# Patient Record
Sex: FEMALE | Race: WHITE | HISPANIC OR LATINO | Employment: OTHER | URBAN - METROPOLITAN AREA
[De-identification: names, ages, dates, MRNs, and addresses within clinical notes are randomized per-mention and may not be internally consistent; named-entity substitution may affect disease eponyms.]

---

## 2017-03-14 RX ORDER — AMLODIPINE BESYLATE 5 MG/1
5 TABLET ORAL DAILY
Qty: 90 TABLET | Refills: 0 | Status: SHIPPED | OUTPATIENT
Start: 2017-03-14 | End: 2017-04-28 | Stop reason: SDUPTHER

## 2017-03-14 RX ORDER — METFORMIN HYDROCHLORIDE 500 MG/1
500 TABLET ORAL 2 TIMES DAILY WITH MEALS
Qty: 180 TABLET | Refills: 0 | Status: SHIPPED | OUTPATIENT
Start: 2017-03-14 | End: 2017-04-28 | Stop reason: SDUPTHER

## 2017-03-16 ENCOUNTER — OFFICE VISIT (OUTPATIENT)
Dept: OPTOMETRY | Facility: CLINIC | Age: 67
End: 2017-03-16
Payer: MEDICARE

## 2017-03-16 DIAGNOSIS — E11.3293 MILD NONPROLIFERATIVE DIABETIC RETINOPATHY OF BOTH EYES WITHOUT MACULAR EDEMA ASSOCIATED WITH TYPE 2 DIABETES MELLITUS: Primary | ICD-10-CM

## 2017-03-16 DIAGNOSIS — H25.11 NUCLEAR SCLEROSIS, RIGHT: ICD-10-CM

## 2017-03-16 PROCEDURE — 92014 COMPRE OPH EXAM EST PT 1/>: CPT | Mod: S$GLB,,, | Performed by: OPTOMETRIST

## 2017-03-16 PROCEDURE — 99499 UNLISTED E&M SERVICE: CPT | Mod: S$GLB,,, | Performed by: OPTOMETRIST

## 2017-03-16 PROCEDURE — 99999 PR PBB SHADOW E&M-EST. PATIENT-LVL III: CPT | Mod: PBBFAC,,, | Performed by: OPTOMETRIST

## 2017-03-16 NOTE — PROGRESS NOTES
HPI     Diabetic eye exam  Blur ou at dist, x mos, no assoc pain or red, no relief over time,   constant       Last edited by Nate Alexandre, OD on 3/16/2017  3:30 PM.     ROS     Negative for: Constitutional, Gastrointestinal, Neurological, Skin,   Genitourinary, Musculoskeletal, HENT, Endocrine, Cardiovascular, Eyes,   Respiratory, Psychiatric, Allergic/Imm, Heme/Lymph    Last edited by Nate Alexandre, OD on 3/16/2017  3:07 PM. (History)        Assessment /Plan     For exam results, see Encounter Report.    Mild nonproliferative diabetic retinopathy of both eyes without macular edema associated with type 2 diabetes mellitus    Nuclear sclerosis, right      1. Mild diabetic retinopathy, no csme. Return in 1 year for dilated eye exam.  2. Refer to Dr. Fermin for cataract evaluation and possible removal.

## 2017-03-20 ENCOUNTER — OFFICE VISIT (OUTPATIENT)
Dept: OPHTHALMOLOGY | Facility: CLINIC | Age: 67
End: 2017-03-20
Payer: MEDICARE

## 2017-03-20 ENCOUNTER — TELEPHONE (OUTPATIENT)
Dept: OPHTHALMOLOGY | Facility: CLINIC | Age: 67
End: 2017-03-20

## 2017-03-20 DIAGNOSIS — Z96.1 PSEUDOPHAKIA: ICD-10-CM

## 2017-03-20 DIAGNOSIS — Z79.4 CONTROLLED TYPE 2 DIABETES MELLITUS WITH BOTH EYES AFFECTED BY MILD NONPROLIFERATIVE RETINOPATHY WITHOUT MACULAR EDEMA, WITH LONG-TERM CURRENT USE OF INSULIN: ICD-10-CM

## 2017-03-20 DIAGNOSIS — H52.7 REFRACTIVE ERROR: ICD-10-CM

## 2017-03-20 DIAGNOSIS — H25.11 NUCLEAR SCLEROSIS, RIGHT: Primary | ICD-10-CM

## 2017-03-20 DIAGNOSIS — E11.3293 CONTROLLED TYPE 2 DIABETES MELLITUS WITH BOTH EYES AFFECTED BY MILD NONPROLIFERATIVE RETINOPATHY WITHOUT MACULAR EDEMA, WITH LONG-TERM CURRENT USE OF INSULIN: ICD-10-CM

## 2017-03-20 DIAGNOSIS — H25.11 NS (NUCLEAR SCLEROSIS), RIGHT: Primary | ICD-10-CM

## 2017-03-20 PROCEDURE — 92014 COMPRE OPH EXAM EST PT 1/>: CPT | Mod: 57,S$GLB,, | Performed by: OPHTHALMOLOGY

## 2017-03-20 PROCEDURE — 92136 OPHTHALMIC BIOMETRY: CPT | Mod: 26,RT,S$GLB, | Performed by: OPHTHALMOLOGY

## 2017-03-20 PROCEDURE — 99499 UNLISTED E&M SERVICE: CPT | Mod: S$GLB,,, | Performed by: OPHTHALMOLOGY

## 2017-03-20 PROCEDURE — 99999 PR PBB SHADOW E&M-EST. PATIENT-LVL II: CPT | Mod: PBBFAC,,, | Performed by: OPHTHALMOLOGY

## 2017-03-20 RX ORDER — OFLOXACIN 3 MG/ML
1 SOLUTION/ DROPS OPHTHALMIC 4 TIMES DAILY
Qty: 5 ML | Refills: 1 | Status: SHIPPED | OUTPATIENT
Start: 2017-03-20 | End: 2017-03-30

## 2017-03-20 RX ORDER — NEPAFENAC 3 MG/ML
1 SUSPENSION/ DROPS OPHTHALMIC DAILY
Qty: 3 ML | Refills: 1 | Status: SHIPPED | OUTPATIENT
Start: 2017-03-20 | End: 2017-04-19

## 2017-03-20 RX ORDER — DIFLUPREDNATE OPHTHALMIC 0.5 MG/ML
1 EMULSION OPHTHALMIC 4 TIMES DAILY
Qty: 5 ML | Refills: 1 | Status: SHIPPED | OUTPATIENT
Start: 2017-03-21 | End: 2017-04-20

## 2017-03-20 NOTE — PROGRESS NOTES
Subjective:       Patient ID: Amanda Reyes is a 67 y.o. female.    Chief Complaint: Cataract (Catarcat Eval per Dr. Alexandre)    HPI  Review of Systems    Objective:      Physical Exam    Assessment:       1. Nuclear sclerosis, right    2. PSC (posterior subcapsular cataract), right    3. Controlled type 2 diabetes mellitus with both eyes affected by mild nonproliferative retinopathy without macular edema, with long-term current use of insulin    4. Refractive error    5. Pseudophakia        Plan:       Visually significant cataract OD -Pt. Wants Sx.     Mild NPDR OU-No CSME OU.  RE        CE OD 3/21/17 SN60WF 20.5.  Control DM.

## 2017-03-21 ENCOUNTER — ANESTHESIA EVENT (OUTPATIENT)
Dept: SURGERY | Facility: HOSPITAL | Age: 67
End: 2017-03-21
Payer: MEDICARE

## 2017-03-21 ENCOUNTER — HOSPITAL ENCOUNTER (OUTPATIENT)
Facility: HOSPITAL | Age: 67
Discharge: HOME | End: 2017-03-21
Attending: OPHTHALMOLOGY | Admitting: OPHTHALMOLOGY
Payer: MEDICARE

## 2017-03-21 ENCOUNTER — SURGERY (OUTPATIENT)
Age: 67
End: 2017-03-21

## 2017-03-21 ENCOUNTER — ANESTHESIA (OUTPATIENT)
Dept: SURGERY | Facility: HOSPITAL | Age: 67
End: 2017-03-21
Payer: MEDICARE

## 2017-03-21 VITALS
DIASTOLIC BLOOD PRESSURE: 70 MMHG | TEMPERATURE: 98 F | HEIGHT: 67 IN | OXYGEN SATURATION: 95 % | BODY MASS INDEX: 27 KG/M2 | WEIGHT: 172 LBS | HEART RATE: 65 BPM | RESPIRATION RATE: 18 BRPM | SYSTOLIC BLOOD PRESSURE: 132 MMHG

## 2017-03-21 DIAGNOSIS — H25.10 SENILE NUCLEAR SCLEROSIS: ICD-10-CM

## 2017-03-21 LAB
POCT GLUCOSE: 296 MG/DL (ref 70–110)
POCT GLUCOSE: 306 MG/DL (ref 70–110)

## 2017-03-21 PROCEDURE — 27201423 OPTIME MED/SURG SUP & DEVICES STERILE SUPPLY: Performed by: OPHTHALMOLOGY

## 2017-03-21 PROCEDURE — D9220A PRA ANESTHESIA: Mod: ,,, | Performed by: ANESTHESIOLOGY

## 2017-03-21 PROCEDURE — 36000706: Performed by: OPHTHALMOLOGY

## 2017-03-21 PROCEDURE — 25000003 PHARM REV CODE 250: Performed by: OPHTHALMOLOGY

## 2017-03-21 PROCEDURE — V2632 POST CHMBR INTRAOCULAR LENS: HCPCS | Performed by: OPHTHALMOLOGY

## 2017-03-21 PROCEDURE — 36000707: Performed by: OPHTHALMOLOGY

## 2017-03-21 PROCEDURE — 82962 GLUCOSE BLOOD TEST: CPT | Performed by: OPHTHALMOLOGY

## 2017-03-21 PROCEDURE — 37000008 HC ANESTHESIA 1ST 15 MINUTES: Performed by: OPHTHALMOLOGY

## 2017-03-21 PROCEDURE — 63600175 PHARM REV CODE 636 W HCPCS: Performed by: OPHTHALMOLOGY

## 2017-03-21 PROCEDURE — C9447 INJ, PHENYLEPHRINE KETOROLAC: HCPCS | Performed by: OPHTHALMOLOGY

## 2017-03-21 PROCEDURE — 71000015 HC POSTOP RECOV 1ST HR: Performed by: OPHTHALMOLOGY

## 2017-03-21 PROCEDURE — 63600175 PHARM REV CODE 636 W HCPCS: Performed by: NURSE ANESTHETIST, CERTIFIED REGISTERED

## 2017-03-21 PROCEDURE — 37000009 HC ANESTHESIA EA ADD 15 MINS: Performed by: OPHTHALMOLOGY

## 2017-03-21 PROCEDURE — 66984 XCAPSL CTRC RMVL W/O ECP: CPT | Mod: RT,,, | Performed by: OPHTHALMOLOGY

## 2017-03-21 PROCEDURE — 25000003 PHARM REV CODE 250: Performed by: NURSE ANESTHETIST, CERTIFIED REGISTERED

## 2017-03-21 DEVICE — LENS 20.5 ACRYSOF: Type: IMPLANTABLE DEVICE | Site: EYE | Status: FUNCTIONAL

## 2017-03-21 RX ORDER — PROPARACAINE HYDROCHLORIDE 5 MG/ML
1 SOLUTION/ DROPS OPHTHALMIC
Status: DISCONTINUED | OUTPATIENT
Start: 2017-03-21 | End: 2017-03-21 | Stop reason: HOSPADM

## 2017-03-21 RX ORDER — PHENYLEPHRINE HYDROCHLORIDE 25 MG/ML
SOLUTION/ DROPS OPHTHALMIC
Status: DISCONTINUED
Start: 2017-03-21 | End: 2017-03-21 | Stop reason: HOSPADM

## 2017-03-21 RX ORDER — SODIUM CHLORIDE 9 MG/ML
INJECTION, SOLUTION INTRAVENOUS CONTINUOUS
Status: DISCONTINUED | OUTPATIENT
Start: 2017-03-21 | End: 2017-03-21 | Stop reason: HOSPADM

## 2017-03-21 RX ORDER — OFLOXACIN 3 MG/ML
1 SOLUTION/ DROPS OPHTHALMIC
Status: COMPLETED | OUTPATIENT
Start: 2017-03-21 | End: 2017-03-21

## 2017-03-21 RX ORDER — PHENYLEPHRINE HYDROCHLORIDE 25 MG/ML
1 SOLUTION/ DROPS OPHTHALMIC
Status: DISCONTINUED | OUTPATIENT
Start: 2017-03-21 | End: 2017-03-21 | Stop reason: HOSPADM

## 2017-03-21 RX ORDER — LIDOCAINE HYDROCHLORIDE 10 MG/ML
1 INJECTION, SOLUTION EPIDURAL; INFILTRATION; INTRACAUDAL; PERINEURAL ONCE
Status: DISCONTINUED | OUTPATIENT
Start: 2017-03-21 | End: 2017-03-21 | Stop reason: HOSPADM

## 2017-03-21 RX ORDER — TROPICAMIDE 10 MG/ML
1 SOLUTION/ DROPS OPHTHALMIC
Status: DISCONTINUED | OUTPATIENT
Start: 2017-03-21 | End: 2017-03-21 | Stop reason: HOSPADM

## 2017-03-21 RX ORDER — PREDNISOLONE ACETATE 10 MG/ML
SUSPENSION/ DROPS OPHTHALMIC
Status: DISCONTINUED
Start: 2017-03-21 | End: 2017-03-21 | Stop reason: HOSPADM

## 2017-03-21 RX ORDER — ACETAMINOPHEN 325 MG/1
650 TABLET ORAL EVERY 4 HOURS PRN
Status: DISCONTINUED | OUTPATIENT
Start: 2017-03-21 | End: 2017-03-21 | Stop reason: HOSPADM

## 2017-03-21 RX ORDER — CYCLOPENTOLATE HYDROCHLORIDE 10 MG/ML
SOLUTION/ DROPS OPHTHALMIC
Status: DISCONTINUED
Start: 2017-03-21 | End: 2017-03-21 | Stop reason: HOSPADM

## 2017-03-21 RX ORDER — OFLOXACIN 3 MG/ML
SOLUTION/ DROPS OPHTHALMIC
Status: DISCONTINUED | OUTPATIENT
Start: 2017-03-21 | End: 2017-03-21 | Stop reason: HOSPADM

## 2017-03-21 RX ORDER — LIDOCAINE HYDROCHLORIDE 40 MG/ML
INJECTION, SOLUTION RETROBULBAR
Status: DISCONTINUED
Start: 2017-03-21 | End: 2017-03-21 | Stop reason: HOSPADM

## 2017-03-21 RX ORDER — LIDOCAINE HYDROCHLORIDE 10 MG/ML
1 INJECTION, SOLUTION EPIDURAL; INFILTRATION; INTRACAUDAL; PERINEURAL ONCE
Status: COMPLETED | OUTPATIENT
Start: 2017-03-21 | End: 2017-03-21

## 2017-03-21 RX ORDER — OFLOXACIN 3 MG/ML
SOLUTION/ DROPS OPHTHALMIC
Status: DISCONTINUED
Start: 2017-03-21 | End: 2017-03-21 | Stop reason: HOSPADM

## 2017-03-21 RX ORDER — SODIUM CHLORIDE 0.9 % (FLUSH) 0.9 %
3 SYRINGE (ML) INJECTION
Status: DISCONTINUED | OUTPATIENT
Start: 2017-03-21 | End: 2017-03-21 | Stop reason: HOSPADM

## 2017-03-21 RX ORDER — HYDROCODONE BITARTRATE AND ACETAMINOPHEN 5; 325 MG/1; MG/1
1 TABLET ORAL EVERY 4 HOURS PRN
Status: DISCONTINUED | OUTPATIENT
Start: 2017-03-21 | End: 2017-03-21 | Stop reason: HOSPADM

## 2017-03-21 RX ORDER — MIDAZOLAM HYDROCHLORIDE 1 MG/ML
INJECTION INTRAMUSCULAR; INTRAVENOUS
Status: DISCONTINUED | OUTPATIENT
Start: 2017-03-21 | End: 2017-03-21

## 2017-03-21 RX ORDER — CYCLOPENTOLATE HYDROCHLORIDE 10 MG/ML
1 SOLUTION/ DROPS OPHTHALMIC
Status: COMPLETED | OUTPATIENT
Start: 2017-03-21 | End: 2017-03-21

## 2017-03-21 RX ORDER — FAMOTIDINE 10 MG/ML
INJECTION INTRAVENOUS
Status: DISCONTINUED | OUTPATIENT
Start: 2017-03-21 | End: 2017-03-21

## 2017-03-21 RX ORDER — LIDOCAINE HYDROCHLORIDE 40 MG/ML
INJECTION, SOLUTION RETROBULBAR
Status: DISCONTINUED | OUTPATIENT
Start: 2017-03-21 | End: 2017-03-21 | Stop reason: HOSPADM

## 2017-03-21 RX ORDER — PREDNISOLONE ACETATE 10 MG/ML
SUSPENSION/ DROPS OPHTHALMIC
Status: DISCONTINUED | OUTPATIENT
Start: 2017-03-21 | End: 2017-03-21 | Stop reason: HOSPADM

## 2017-03-21 RX ADMIN — OFLOXACIN 1 DROP: 3 SOLUTION OPHTHALMIC at 07:03

## 2017-03-21 RX ADMIN — FAMOTIDINE 20 MG: 10 INJECTION, SOLUTION INTRAVENOUS at 07:03

## 2017-03-21 RX ADMIN — MIDAZOLAM HYDROCHLORIDE 1 MG: 1 INJECTION, SOLUTION INTRAMUSCULAR; INTRAVENOUS at 08:03

## 2017-03-21 RX ADMIN — CYCLOPENTOLATE HYDROCHLORIDE 1 DROP: 10 SOLUTION/ DROPS OPHTHALMIC at 07:03

## 2017-03-21 RX ADMIN — SODIUM CHONDROITIN SULFATE / SODIUM HYALURONATE 1.05 ML: 0.55-0.5 INJECTION INTRAOCULAR at 08:03

## 2017-03-21 RX ADMIN — PHENYLEPHRINE HYDROCHLORIDE 1 DROP: 2.5 SOLUTION/ DROPS OPHTHALMIC at 07:03

## 2017-03-21 RX ADMIN — PHENYLEPHRINE AND KETOROLAC 4 ML: 10.16; 2.88 INJECTION, SOLUTION, CONCENTRATE INTRAOCULAR at 08:03

## 2017-03-21 RX ADMIN — MIDAZOLAM HYDROCHLORIDE 2 MG: 1 INJECTION, SOLUTION INTRAMUSCULAR; INTRAVENOUS at 07:03

## 2017-03-21 RX ADMIN — SODIUM CHLORIDE: 0.9 INJECTION, SOLUTION INTRAVENOUS at 07:03

## 2017-03-21 RX ADMIN — ACETAMINOPHEN 650 MG: 325 TABLET ORAL at 08:03

## 2017-03-21 RX ADMIN — OFLOXACIN 2 DROP: 3 SOLUTION/ DROPS OPHTHALMIC at 08:03

## 2017-03-21 RX ADMIN — LIDOCAINE HYDROCHLORIDE 5 ML: 40 INJECTION, SOLUTION RETROBULBAR; TOPICAL at 08:03

## 2017-03-21 RX ADMIN — PREDNISOLONE ACETATE 2 DROP: 10 SUSPENSION/ DROPS OPHTHALMIC at 08:03

## 2017-03-21 RX ADMIN — MIDAZOLAM HYDROCHLORIDE 0.5 MG: 1 INJECTION, SOLUTION INTRAMUSCULAR; INTRAVENOUS at 08:03

## 2017-03-21 RX ADMIN — LIDOCAINE HYDROCHLORIDE 10 MG: 10 INJECTION, SOLUTION EPIDURAL; INFILTRATION; INTRACAUDAL; PERINEURAL at 07:03

## 2017-03-21 RX ADMIN — PROPARACAINE HYDROCHLORIDE 1 DROP: 5 SOLUTION/ DROPS OPHTHALMIC at 07:03

## 2017-03-21 NOTE — TRANSFER OF CARE
"Anesthesia Transfer of Care Note    Patient: Amanda Reyes    Procedure(s) Performed: Procedure(s) (LRB):  PHACOEMULSIFICATION-ASPIRATION-CATARACT (Right)  INSERTION-INTRAOCULAR LENS (IOL) (Right)    Patient location: PACU    Anesthesia Type: MAC    Transport from OR: Transported from OR on room air with adequate spontaneous ventilation    Post pain: adequate analgesia    Post assessment: no apparent anesthetic complications    Post vital signs: stable    Level of consciousness: awake, alert and oriented    Nausea/Vomiting: no nausea/vomiting    Complications: none          Last vitals:   Visit Vitals   03/21/17 0849    /75    Pulse 68    Temp 97.9    Resp 16    Ht 5' 7" (1.702 m)    Wt 78 kg (172 lb)    SpO2 97%    BMI 26.94 kg/m2     "

## 2017-03-21 NOTE — ANESTHESIA RELEASE NOTE
Anesthesia Release from PACU Note    Anesthesia Release from PACU note    Patient: Amanda Reyes    Procedure(s) Performed: Procedure(s) (LRB):  PHACOEMULSIFICATION-ASPIRATION-CATARACT (Right)  INSERTION-INTRAOCULAR LENS (IOL) (Right)    Anesthesia type: general    Post pain: Adequate analgesia    Post assessment: no apparent anesthetic complications, tolerated procedure well and no evidence of recall    Last Vitals:   Vitals:    03/21/17 0844   BP: (!) 151/75   Pulse: 68   Resp: 18   Temp: 36.7 °C (98.1 °F)   SpO2: 97%       Post vital signs: stable    Level of consciousness: awake, alert  and oriented    Nausea/Vomiting: no nausea/no vomiting    Complications: none    Airway Patency: patent    Respiratory: unassisted    Cardiovascular: stable and blood pressure at baseline    Hydration: euvolemic

## 2017-03-21 NOTE — PLAN OF CARE
Pt tolerated procedure well, vs stable, no distress noted or reported, tolerated PO without difficulties, pain well tolerated, discharge instructions reviewed with pt and family, verbalized understanding, consents with chart.

## 2017-03-21 NOTE — PLAN OF CARE
Problem: Patient Care Overview  Goal: Plan of Care Review  Outcome: Ongoing (interventions implemented as appropriate)  Pt arrived amb a/a nad noted or reported. Pt gave informed consent and voiced intended procedure. Pt gave two verbal patient identifiers, allergies, NPO status and reported meds taken this am. No s/s of bs intolerance noted or reported. Pt is Estonian speaking with daughter attempting to assist in answering questions regarding health history. Poor historians and anesthesia notified. Interpretor contacted to help clarify pt's history and medication administration.    Dr. Carmen notified pt is hyperglycemic. No new rxg, no s/s of intolerance noted

## 2017-03-21 NOTE — ANESTHESIA PREPROCEDURE EVALUATION
03/21/2017  Amanda Reyes is a 67 y.o., female.    [facility] Anesthesia Evaluation         Review of Systems  Anesthesia Hx:  No problems with previous Anesthesia   Cardiovascular:   Exercise tolerance: good Hypertension, well controlled  Denies Angina.    Pulmonary:   Denies Shortness of breath.    Hepatic/GI:   GERD    Musculoskeletal:   Arthritis     Endocrine:   Diabetes, poorly controlled    Psych:   anxiety depression          Physical Exam  General:  Well nourished    Airway/Jaw/Neck:  Airway Findings: Mouth Opening: Normal Tongue: Normal  General Airway Assessment: Adult  Mallampati: II  TM Distance: Normal, at least 6 cm  Jaw/Neck Findings:     Neck ROM: Normal ROM      Dental:  Dental Findings: In tact   Chest/Lungs:  Chest/Lungs Findings: Normal Respiratory Rate, Clear to auscultation     Heart/Vascular:  Heart Findings: Rate: Normal  Rhythm: Regular Rhythm  Sounds: Normal        Mental Status:  Mental Status Findings:  Alert and Oriented, Cooperative         Anesthesia Plan  Type of Anesthesia, risks & benefits discussed:  Anesthesia Type:  general  Patient's Preference:   Intra-op Monitoring Plan:   Intra-op Monitoring Plan Comments:   Post Op Pain Control Plan:   Post Op Pain Control Plan Comments:   Induction:   IV  Beta Blocker:  Patient is not currently on a Beta-Blocker (No further documentation required).       Informed Consent: Patient understands risks and agrees with Anesthesia plan.  Questions answered. Anesthesia consent signed with patient.  ASA Score: 2     Day of Surgery Review of History & Physical:            Ready For Surgery From Anesthesia Perspective.

## 2017-03-21 NOTE — H&P
[facility]  History & Physical    Subjective:      Chief Complaint/Reason for Admission:     Amanda Reyes is a 67 y.o. female.    Past Medical History:   Diagnosis Date    Abnormal Pap smear     Anxiety     Arthritis     Cataract     Cataract of left eye     Diabetes     Diabetes mellitus, type 2     Eye problem     GERD (gastroesophageal reflux disease)     Head and face pain     Hypertension      Past Surgical History:   Procedure Laterality Date    CATARACT EXTRACTION W/  INTRAOCULAR LENS IMPLANT Left 5/12/2015    Dr. Fermin    HYSTERECTOMY      JOINT REPLACEMENT      bilateral knees    KIDNEY SURGERY      right nephrectomy    TUBAL LIGATION       Family History   Problem Relation Age of Onset    Hypertension Mother     Hypertension Father     Cataracts Father     No Known Problems Maternal Grandmother     No Known Problems Maternal Grandfather     No Known Problems Paternal Grandmother     No Known Problems Paternal Grandfather     No Known Problems Sister     No Known Problems Brother     No Known Problems Maternal Aunt     No Known Problems Maternal Uncle     No Known Problems Paternal Aunt     No Known Problems Paternal Uncle     Anesthesia problems Neg Hx     Amblyopia Neg Hx     Blindness Neg Hx     Glaucoma Neg Hx     Macular degeneration Neg Hx     Retinal detachment Neg Hx     Strabismus Neg Hx     Cancer Neg Hx     Diabetes Neg Hx     Stroke Neg Hx     Thyroid disease Neg Hx      Social History   Substance Use Topics    Smoking status: Never Smoker    Smokeless tobacco: None    Alcohol use No       PTA Medications   Medication Sig    amlodipine (NORVASC) 5 MG tablet Take 1 tablet (5 mg total) by mouth once daily.    diazepam (VALIUM) 5 MG tablet Take 1 tablet (5 mg total) by mouth every 12 (twelve) hours as needed for Anxiety.    difluprednate (DUREZOL) 0.05 % Drop ophthalmic solution Place 1 drop into the right eye 4 (four)  times daily. For 30 days    fluoxetine (PROZAC) 20 MG capsule Take 1 capsule (20 mg total) by mouth once daily.    ILEVRO 0.3 % DrpS Place 1 drop into both eyes once daily. For 30 days    lovastatin (MEVACOR) 20 MG tablet Take 1 tablet (20 mg total) by mouth every evening.    metformin (GLUCOPHAGE) 500 MG tablet Take 1 tablet (500 mg total) by mouth 2 (two) times daily with meals.    omeprazole (PRILOSEC) 20 MG capsule Take 1 capsule (20 mg total) by mouth before breakfast.    enalapril (VASOTEC) 20 MG tablet Take 1 tablet (20 mg total) by mouth once daily.    fesoterodine (TOVIAZ) 4 mg Tb24 Take 1 tablet (4 mg total) by mouth once daily.    GLUCOPHAGE 500 mg tablet     hydrochlorothiazide (HYDRODIURIL) 25 MG tablet Take 1 tablet (25 mg total) by mouth once daily.    ofloxacin (OCUFLOX) 0.3 % ophthalmic solution Place 1 drop into the right eye 4 (four) times daily.    peg 400-propylene glycol, PF, (SYSTANE ULTRA, PF,) 0.4-0.3 % Dpet Apply 1 drop to eye 4 (four) times daily as needed.    senna-docusate 8.6-50 mg (PERICOLACE) 8.6-50 mg per tablet Take 1 tablet by mouth 2 (two) times daily as needed for Constipation.    UNABLE TO FIND Take 20 mg by mouth once daily. Fluoextine    valacyclovir (VALTREX) 1000 MG tablet Take 1 tablet (1,000 mg total) by mouth 2 (two) times daily.     Review of patient's allergies indicates:  No Known Allergies     Review of Systems   Eyes: Positive for blurred vision.   All other systems reviewed and are negative.      Objective:      Vital Signs (Most Recent)  Temp: 98.1 °F (36.7 °C) (03/21/17 0643)  Pulse: 78 (03/21/17 0643)  Resp: 16 (03/21/17 0643)  BP: (!) 147/72 (03/21/17 0643)  SpO2: 97 % (03/21/17 0643)    Vital Signs Range (Last 24H):  Temp:  [98.1 °F (36.7 °C)]   Pulse:  [78]   Resp:  [16]   BP: (147)/(72)   SpO2:  [97 %]     Physical Exam   Constitutional: She is oriented to person, place, and time. She appears well-developed and well-nourished.   HENT:   Head:  Normocephalic.   Eyes: Conjunctivae and EOM are normal. Pupils are equal, round, and reactive to light.   Neck: Normal range of motion. Neck supple.   Cardiovascular: Normal rate, regular rhythm and normal heart sounds.    Pulmonary/Chest: Effort normal and breath sounds normal.   Abdominal: Soft. Bowel sounds are normal.   Musculoskeletal: Normal range of motion.   Neurological: She is alert and oriented to person, place, and time.   Skin: Skin is warm.   Psychiatric: She has a normal mood and affect.       Data Review:   ECG:     Assessment:      Cataract OD.    Plan:    CE OD.

## 2017-03-21 NOTE — IP AVS SNAPSHOT
[facility]  [address] [place] [address]  Phone: [phone]           Instrucciones de Mary de Pacientes    Nuestro objetivo es que te prepara para el éxito. Keri paquete incluye información sobre yadav enfermedad, medicamentos y atención médica a domicilio. [place]ron le ayudará a cuidar y que no se enferman más y necesita volver al hospital.     Por favor, pregunte a yadav enfermera si tiene alguna pregunta.       Hay muchos detalles a recordar cuando se prepara para salir del hospital. Capron es lo que necesita hacer:    1. [place] yadav medicina. Si usted tiene agueda receta, revise la lista de medicamentos en las siguientes páginas. Es posible que tenga nuevos medicamentos para recoger en la farmacia y otros que tendrá que dejar de wilfred. Revise las instrucciones sobre cómo y cuándo wilfred niyah medicamentos. Consulte con el médico o el enfermeras si no está seguro de qué hacer.    2. Ir a niyah citas de seguimiento. La información específica de seguimiento aparece en las siguientes páginas. Usted puede ser contactado por agueda enfermera o proveedor clínico sobre las próximas citas. Estar seguro de que tiene todos los números de teléfono para comunicarse si es necesario. Por favor, póngase en contacto con la oficina de yadav profesional médico si no puede hacer agueda mary.     3. Esté atento a señales de advertencia. El médico o la enfermera le dará señales de alarma detallados que debe observar y cuándo llamar para la ayuda. Estas instrucciones también pueden incluir información educativa acerca de yadav condición. Si usted experimenta cualquiera de las señales de advertencia para yadav jake, llame a yadav médico.             [facility]    Si yadav médico no le ha indicado a lo contrário, por favor   contactar a [facility], nuestra línea de cuidado de enfermeras está disponible para asistirle 24/7.    [phone] (servicio gratuito)    Enfermeras registradas de [facility] pueden ayudarle a  reservar agueda mary, proveer educación para la jake, asesoría clínica, y otros servicios de asesoramiento.                  ** Verificar la lista de medicamentos es correcta y está actualizada. Llevar esto con usted en dwaine de emergencia. Si niyah medicamentos lynn cambiado, por favor notifique a yadav proveedor de atención médica.             Lista de medicamentos      SIGA tomando estos medicamentos        Additional Info                      amlodipine 5 MG tablet   También conocido prerna:  NORVASC   Cantidad:  90 tablet   Recargas:  0   Dosis:  5 mg    Instrucciones:  Take 1 tablet (5 mg total) by mouth once daily.     Begin Date    AM    Noon    PM    Bedtime       diazePAM 5 MG tablet   También conocido prerna:  VALIUM   Cantidad:  90 tablet   Recargas:  0   Dosis:  5 mg    Instrucciones:  Take 1 tablet (5 mg total) by mouth every 12 (twelve) hours as needed for Anxiety.     Begin Date    AM    Noon    PM    Bedtime       difluprednate 0.05 % Drop ophthalmic solution   También conocido prerna:  DUREZOL   Cantidad:  5 mL   Recargas:  1   Dosis:  1 drop    Instrucciones:  Place 1 drop into the right eye 4 (four) times daily. For 30 days     Begin Date    AM    Noon    PM    Bedtime       enalapril 20 MG tablet   También conocido prerna:  VASOTEC   Cantidad:  90 tablet   Recargas:  0   Dosis:  20 mg    Instrucciones:  Take 1 tablet (20 mg total) by mouth once daily.     Begin Date    AM    Noon    PM    Bedtime       fesoterodine 4 mg Tb24   También conocido prerna:  TOVIAZ   Cantidad:  90 tablet   Recargas:  0   Dosis:  4 mg    Instrucciones:  Take 1 tablet (4 mg total) by mouth once daily.     Begin Date    AM    Noon    PM    Bedtime       fluoxetine 20 MG capsule   También conocido prerna:  PROZAC   Cantidad:  90 capsule   Recargas:  1   Dosis:  20 mg    Instrucciones:  Take 1 capsule (20 mg total) by mouth once daily.     Begin Date    AM    Noon    PM    Bedtime       hydrochlorothiazide 25 MG tablet   También conocido prerna:   HYDRODIURIL   Cantidad:  90 tablet   Recargas:  0   Dosis:  25 mg    Instrucciones:  Take 1 tablet (25 mg total) by mouth once daily.     Begin Date    AM    Noon    PM    Bedtime       ILEVRO 0.3 % Drps   Cantidad:  3 mL   Recargas:  1   Dosis:  1 drop   Medicamento genérico:  nepafenac    Instrucciones:  Place 1 drop into both eyes once daily. For 30 days     Begin Date    AM    Noon    PM    Bedtime       lovastatin 20 MG tablet   También conocido prerna:  MEVACOR   Cantidad:  90 tablet   Recargas:  0   Dosis:  20 mg    Instrucciones:  Take 1 tablet (20 mg total) by mouth every evening.     Begin Date    AM    Noon    PM    Bedtime       * metformin 500 MG tablet   También conocido prerna:  GLUCOPHAGE   Cantidad:  180 tablet   Recargas:  0   Dosis:  500 mg    Instrucciones:  Take 1 tablet (500 mg total) by mouth 2 (two) times daily with meals.     Begin Date    AM    Noon    PM    Bedtime       * GLUCOPHAGE 500 MG tablet   Recargas:  0   Medicamento genérico:  metformin      Begin Date    AM    Noon    PM    Bedtime       ofloxacin 0.3 % ophthalmic solution   También conocido prerna:  OCUFLOX   Cantidad:  5 mL   Recargas:  1   Dosis:  1 drop    Última administración:  2 drops on 3/21/2017  8:19 AM   Instrucciones:  Place 1 drop into the right eye 4 (four) times daily.     Begin Date    AM    Noon    PM    Bedtime       omeprazole 20 MG capsule   También conocido prerna:  PRILOSEC   Cantidad:  90 capsule   Recargas:  0   Dosis:  20 mg    Instrucciones:  Take 1 capsule (20 mg total) by mouth before breakfast.     Begin Date    AM    Noon    PM    Bedtime       peg 400-propylene glycol (PF) 0.4-0.3 % Dpet   También conocido prerna:  SYSTANE ULTRA (PF)   Cantidad:  1 each   Recargas:  0   Dosis:  1 drop    Instrucciones:  Apply 1 drop to eye 4 (four) times daily as needed.     Begin Date    AM    Noon    PM    Bedtime       senna-docusate 8.6-50 mg 8.6-50 mg per tablet   También conocido prerna:  PERICOLACE   Cantidad:  60  tablet   Recargas:  1   Dosis:  1 tablet    Instrucciones:  Take 1 tablet by mouth 2 (two) times daily as needed for Constipation.     Begin Date    AM    Noon    PM    Bedtime       UNABLE TO FIND   Recargas:  0   Dosis:  20 mg    Instrucciones:  Take 20 mg by mouth once daily. Fluoextine     Begin Date    AM    Noon    PM    Bedtime       valacyclovir 1000 MG tablet   También conocido prerna:  VALTREX   Cantidad:  20 tablet   Recargas:  0   Dosis:  1000 mg    Instrucciones:  Take 1 tablet (1,000 mg total) by mouth 2 (two) times daily.     Begin Date    AM    Noon    PM    Bedtime       * Aviso:  Esta lista contiene medicamentos que son iguales a otros medicamentos recetados para usted. Ivette las instrucciones con cuidado y pida a yadav personal médico que revise la lista de medicamentos y las instrucciones correspondientes con usted.               Por favor traiga a todos las citas de seguimiento:    1. Sade copia de las instrucciones de farhana.  2. Todos los medicamentos que está tomando julia momento, en [place] envases originales.  3. Identificación y tarjeta de seguro.    Por favor llegue 15 minutos antes de la hora de la mary.    Por favor llame con 24 horas de antelación si tiene que cambiar yadav mary y / o tiempo.        [place] Citas Programadas     Mar 22, 2017  8:00 AM CDT   Post OP with Nate Fermin MD   [place] [facility]    [address]  [place] [address]   [phone]            Mar 29, 2017  9:00 AM CDT   Post OP with Nate Fermin MD   [place] [facility]    [address]  [place] [address]   [phone]            Apr 19, 2017 11:00 AM CDT   Post OP with Nate Fermin MD   [place] [facility]    [address]  [place] [address]   [phone]            Apr 28, 2017 10:20 AM CDT   Physical with Neftali Peters MD   [place] [facility]    [address] [place] [address]   [phone]                Instrucciones de farhana     Órdenes Futuras    Other restrictions (specify):     Comentarios:    No heavy lifting or bending for 1 week.        Primary Diagnosis     Yadav diagnosis primaria es:  Cataract      Información de Admisión     Fecha y hora Proveedor Departamento [facility]    3/21/2017  5:43 AM Nate Fermin MD [facility] [id number]      Los proveedores de cuidado     Personal Médico Rol [facility] Teléfono principal de la oficina    Nate Fermin MD Attending Provider Ophthalmology [phone]    Nate Fermin MD Surgeon  Ophthalmology [phone]      [place] signos vitales tara     PS Pulso Temperatura Resp Cashiers Peso    151/75 (BP Location: Left arm, Patient Position: Lying, BP Method: Automatic) 68 98.1 °F (36.7 °C) (Temporal) 18 5' 7" (1.702 m) 78 kg (172 lb)    SpO2 BMI (IMC)                97% 26.94 kg/m2          Recent Lab Values     No lab values to display.      Alergias     A partir del:  3/21/2017        No Known Allergies      Directiva Anticipada     Agueda directiva anticipada es un documento que, en dwaine de que ya no capaz de hacer decisiones por sí mismo, le dice a yadav equipo médico qué tipo de tratamiento quiere o no quiere recibir, o que le gustaría wilfred esas decisiones para usted . Si actualmente no tiene agueda directiva anticipada, [facility] le anima a crear scot. Para más información llame al: [phone] ([phone]), o entrando en [email or web address].        Language Assistance Services     ATTENTION: Language assistance services are available, free of charge. Please call [phone].      ATENCIÓN: Si habla español, tiene a yadav disposición servicios gratuitos de asistencia lingüística. Llame al [phone].     MULU Ý: N?u b?n nói Ti?ng Vi?t, có các d?ch v? h? tr? ngôn ng? mi?n phí dành cho b?n. G?i s? [phone].        Instrucciones de farhana para " la diabetes       Diabetes (Información general)  La diabetes es un problema de jake a abdifatah plazo que significa que yadav cuerpo no produce la suficiente insulina. O también puede significar que yadav cuerpo no puede utilizar la insulina que produce. La insulina es agueda hormona en yadav organismo, que permite que el azúcar en la sonali (glucosa) llegue a las células en yadav cuerpo. Todas niyah células necesitan glucosa prerna combustible.  Cuando usted tiene diabetes la glucosa en yadav cuerpo se acumula porque no puede llegar hasta las células. Esta acumulación se conoce prerna un nivel alto de azúcar en la sonali (hiperglucemia).  Yadav nivel de azúcar en la sonali depende de varias cosas. Depende de la clase de alimentos que usted come y de cuánto come. También depende de cuánto ejercicio hace y de cuánta insulina tiene en yadav organismo. Shields mucho de las clases indebidas de alimentos o no irene a tiempo los medicamentos para la diabetes puede causar con el tiempo un nivel alto de azúcar en la sonali. Las infecciones pueden provocar un nivel alto de azúcar en la sonali, incluso si está tomando niyah medicamentos correctamente.  Estas cosas también pueden causar un nivel bajo de azúcar en la sonali:  · Saltarse las comidas  · No comer suficientes alimentos  · Irene demasiado de un medicamento para la diabetes  Con el tiempo, si no se trata la diabetes puede causar problemas serios. Estos problemas incluyen enfermedades del corazón, ataque cerebral, insuficiencia renal y ceguera. También pueden incluir dolor en los nervios o pérdida de sensación en niyah piernas o pies. Al mantener yadav azúcar en la sonali bajo control usted puede prevenir o retrasar estos problemas.  Los niveles normales en la sonali son de 80 a 130 antes de las comidas y menos de 180 de 1 a 2 horas después de comer.    Cuidados en el hogar  Cuando se esté cuidando usted mismo en yadav hogar, siga estas pautas:  · Siga la dieta que le recomiende yadav proveedor de atención  médica.  Use la insulina o cualquier otro medicamento para la diabetes, exactamente prerna se lo indiquen.  · Vigile niyah niveles de azúcar en la sonali prerna le indiquen. Lleve un registro con los resultados. [place] le ayudará a yadav proveedor a cambiar niyah medicamentos para mantener el azúcar en la sonali bajo control.   · Trate de alcanzar yadav peso ideal. Es posible que pueda reducir o dejar de wilfred medicamentos para la diabetes si come los alimentos adecuados y hace ejercicio.  · No fume. Fumar hace que los efectos de la diabetes empeoren en yadav circulación. Si usted fuma y tiene diabetes es mucho más probable que sufra un ataque al corazón.   · Cuídese silvano niyah pies. Si mackay perdido la sensación en niyah pies, es posible que no megan agueda herida o agueda infección. Revise niyah pies y la elizabeth entre los dedos por lo menos agueda vez a la semana.    · Utilice yadav brazalete de alerta médica o lleve agueda tarjeta en yadav billetera que diga que usted tiene diabetes. [place] les ayudará a los proveedores de atención médica a brindarle los cuidados adecuados si usted se enferma hasta el punto que no pueda decirles que tiene diabetes.  Plan para un día de enfermedad  Si usted contrae un resfriado, la gripe o agueda infección viral, siga estos pasos:  · Revise yadav plan para un día de enfermedad de la diabetes y llame a yadav proveedor de atención médica prerna le indicaron que lo hiciera. Es posible que deba llamar al proveedor de inmediato si:  ¨ Yadav nivel de azúcar en la sonali está por encima de 240 a pesar de estar tomando los medicamentos para la diabetes  ¨ Los niveles de cetonas en yadav orina están por encima de lo normal o altos  ¨ Ha estado vomitando cassandra más de 6 horas  ¨ Tiene dificultades para respirar  ¨ Tiene agueda fiebre farhana  ¨ Tiene fiebre cassandra varios días y no mejora  ¨ Se siente más aturdido o somnoliento de lo usual  · Siga tomando niyah pastillas para la diabetes (medicamentos orales) incluso si ha estado vomitando y se siente muy enfermo.  Llame a yadav proveedor de inmediato porque es posible que necesite insulina para bajar los niveles de azúcar en la sonali hasta que se recupere de yadav enfermedad.    · Monitoree yadav insulina incluso si ha estado vomitando y se siente muy enfermo. Llame a yadav proveedor de inmediato y pregunte si necesita cambiar yadav dosis de insulina. [place] dependerá de los resultados de niyah niveles de azúcar en la sonali.   · Revise yadav nivel de azúcar en la sonali cada 2 a 4 horas, o por lo menos 4 veces al día.  · Revise con frecuencia niyah cetonas. Si está vomitando y tiene diarrea, revíselas con más frecuencia.  · No se salte comidas. Trate de comer comidas pequeñas a intervalos regulares. Hágalo aunque no sienta gana de comer.  · Denice agua y otros líquidos que no contengan cafeína o calorías. [place] prevendrá que se deshidrate. Si tiene náuseas o vómito, tome pequeños sorbos cada 5 minutos. Para prevenir la deshidratación trate de beber agueda taza (8 onzas) de líquido cada hora mientras esté despierto.  Cuidados generales  Siempre lleve con usted agueda felipe de azúcar de acción rápida para el dwaine de que tenga síntomas de un nivel bajo de azúcar en la sonali (menos de 70). A las primeras señales de un nivel bajo de azúcar en la sonali, coma o denice de 15 a 20 gramos de azúcar de acción rápida para elevar el nivel de azúcar en la sonali. Algunos ejemplos son:    · 3 a 4 tabletas de glucosa. Estas pueden comprarse en la mayoría de las farmacias.  · 4 onzas (1/2 taza) de un refresco (que no sea de dieta)  · 4 onzas (1/2 taza) de cualquier jugo de fruta  · 8 onzas (1 taza) de leche  · 5 a 6 unidades de caramelos duros  · 1 cucharada de miel  Revise yadav nivel de azúcar en la sonali 15 minutos después de tratarse. Si sigue por debajo de 70, tome de 15 a 20 gramos más de azúcar de acción rápida. Vuelva a revisarse en 15 minutos. Si regresa a lo normal (70 o más), coma un bocadillo o agueda comida para mantener el azúcar en la sonali dentro de un nivel  seguro. Si permanece bajo, llame a yadav médico o vaya a agueda jarad de emergencias.  Cuidado de seguimiento  Anupam agueda mary de seguimiento con yadav proveedor de atención médica, o prerna le indiquen. Para más información acerca de la diabetes, visite la página web de la Asociación Americana de la Diabetes (American Diabetes Association) en www.diabetes.org o llame al [phone].  Cuándo buscar consejo médico  Llame a yadav proveedor de atención médica de inmediato si tiene cualquiera de estos síntomas de un nivel alto de azúcar en la sonali:    · Orina frecuentemente  · Mareos  · Somnolencia  · Sed  · Dolor de tesfaye  · Náuseas o vómito  · Dolor abdominal  · Cambios en la vista  · Respiración rápida  · Confusión o pérdida del conocimiento  También llame a yadav proveedor de inmediato si tiene alguna de estas señales de un nivel bajo de azúcar en la sonali:    · Fatiga  · Dolor de tesfaye  · Temblores  · Sudoración excesiva  · Hambre  · Se siente ansioso o inquieto  · Cambios en la vista  · Somnolencia  · Debilidad  · Confusión o pérdida del conocimiento  Llame a yadav proveedor de inmediato si cualquiera de estos ocurre:  · Dolor en el pecho o falta de aire  · Mareos o desmayos  · Debilidad en un brazo o agueda pierna, o en un lado de la patty  · Dificultad para hablar o para neo   Date Last Reviewed: 6/1/2016  © 0460-0995 The "[employer]. [address] [place] [address], [place] [address]. Todos los derechos reservados. Esta información no pretende sustituir la atención médica profesional. Sólo yadav médico puede diagnosticar y tratar un problema de jake.              Registrarse para [facility]     La activación de yadav cuenta [facility] es tan fácil prerna 1-2-3!    1) Ir a [email or web address], seleccione Registrarse Ahora, meter el código de activación y yadav fecha de nacimiento, y seleccione Próximo.    [id number]  Expires: 5/4/2017 11:00 AM      2) Crear un nombre de usuario y contraseña para usar cuando se visita [facility] en el futuro y  selecciona agueda pregunta de seguridad en dwaine de que pierda yadav contraseña y seleccione Próximo.    3) Introduzca yadav dirección de correo electrónico y pooja carleen en Registrarse!    Información Adicional  Si tiene alguna pregunta, por favor, e-mail [email or web address] o llame al [phone] para hablar con nuestro personal. Recuerde, [place] no debe ser usada para necesidades urgentes. En dwaine de emergencia médica, llame al [phone].         [facility] cumple con las leyes federales aplicables de derechos civiles y no discrimina por motivos de luz, color, origen nacional, edad, discapacidad, o sexo.                        [address] [address]  Phone: [phone]           Patient Discharge Instructions     Our goal is to set you up for success. This packet includes information on your condition, medications, and your home care. It will help you to care for yourself so you don't get sicker and need to go back to the hospital.     Please ask your nurse if you have any questions.        There are many details to remember when preparing to leave the hospital. Here is what you will need to do:    1. Take your medicine. If you are prescribed medications, review your Medication List in the following pages. You may have new medications to  at the pharmacy and others that you'll need to stop taking. Review the instructions for how and when to take your medications. Talk with your doctor or nurses if you are unsure of what to do.     2. Go to your follow-up appointments. Specific follow-up information is listed in the following pages. Your may be contacted by a transition nurse or clinical provider about future appointments. Be sure we have all of the phone numbers to reach you, if needed. Please contact your provider's office if you are unable to make an appointment.     3. Watch for warning signs. Your doctor or nurse will give you detailed  warning signs to watch for and when to call for assistance. These instructions may also include educational information about your condition. If you experience any of warning signs to your health, call your doctor.               [facility]  Unless otherwise directed by your provider, please contact [facility], our nurse care line that is available for 24/7 assistance.     [phone] (toll-free)    Registered nurses in the [facility] provide clinical advisement, health education, appointment booking, and other advisory services.                ** Verificar la lista de medicamentos es correcta y está actualizada. Llevar esto con usted en dwaine de emergencia. Si niyah medicamentos lynn cambiado, por favor notifique a yadav proveedor de atención médica.             Medication List      CONTINUE taking these medications        Additional Info                      amlodipine 5 MG tablet   Commonly known as:  NORVASC   Quantity:  90 tablet   Refills:  0   Dose:  5 mg    Instructions:  Take 1 tablet (5 mg total) by mouth once daily.     Begin Date    AM    Noon    PM    Bedtime       diazePAM 5 MG tablet   Commonly known as:  VALIUM   Quantity:  90 tablet   Refills:  0   Dose:  5 mg    Instructions:  Take 1 tablet (5 mg total) by mouth every 12 (twelve) hours as needed for Anxiety.     Begin Date    AM    Noon    PM    Bedtime       difluprednate 0.05 % Drop ophthalmic solution   Commonly known as:  DUREZOL   Quantity:  5 mL   Refills:  1   Dose:  1 drop    Instructions:  Place 1 drop into the right eye 4 (four) times daily. For 30 days     Begin Date    AM    Noon    PM    Bedtime       enalapril 20 MG tablet   Commonly known as:  VASOTEC   Quantity:  90 tablet   Refills:  0   Dose:  20 mg    Instructions:  Take 1 tablet (20 mg total) by mouth once daily.     Begin Date    AM    Noon    PM    Bedtime       fesoterodine 4 mg Tb24   Commonly known as:  TOVIAZ   Quantity:  90 tablet   Refills:  0   Dose:  4  mg    Instructions:  Take 1 tablet (4 mg total) by mouth once daily.     Begin Date    AM    Noon    PM    Bedtime       fluoxetine 20 MG capsule   Commonly known as:  PROZAC   Quantity:  90 capsule   Refills:  1   Dose:  20 mg    Instructions:  Take 1 capsule (20 mg total) by mouth once daily.     Begin Date    AM    Noon    PM    Bedtime       hydrochlorothiazide 25 MG tablet   Commonly known as:  HYDRODIURIL   Quantity:  90 tablet   Refills:  0   Dose:  25 mg    Instructions:  Take 1 tablet (25 mg total) by mouth once daily.     Begin Date    AM    Noon    PM    Bedtime       ILEVRO 0.3 % Drps   Quantity:  3 mL   Refills:  1   Dose:  1 drop   Generic drug:  nepafenac    Instructions:  Place 1 drop into both eyes once daily. For 30 days     Begin Date    AM    Noon    PM    Bedtime       lovastatin 20 MG tablet   Commonly known as:  MEVACOR   Quantity:  90 tablet   Refills:  0   Dose:  20 mg    Instructions:  Take 1 tablet (20 mg total) by mouth every evening.     Begin Date    AM    Noon    PM    Bedtime       * metformin 500 MG tablet   Commonly known as:  GLUCOPHAGE   Quantity:  180 tablet   Refills:  0   Dose:  500 mg    Instructions:  Take 1 tablet (500 mg total) by mouth 2 (two) times daily with meals.     Begin Date    AM    Noon    PM    Bedtime       * GLUCOPHAGE 500 MG tablet   Refills:  0   Generic drug:  metformin      Begin Date    AM    Noon    PM    Bedtime       ofloxacin 0.3 % ophthalmic solution   Commonly known as:  OCUFLOX   Quantity:  5 mL   Refills:  1   Dose:  1 drop    Last time this was given:  2 drops on 3/21/2017  8:19 AM   Instructions:  Place 1 drop into the right eye 4 (four) times daily.     Begin Date    AM    Noon    PM    Bedtime       omeprazole 20 MG capsule   Commonly known as:  PRILOSEC   Quantity:  90 capsule   Refills:  0   Dose:  20 mg    Instructions:  Take 1 capsule (20 mg total) by mouth before breakfast.     Begin Date    AM    Noon    PM    Bedtime       peg  400-propylene glycol (PF) 0.4-0.3 % Dpet   Commonly known as:  SYSTANE ULTRA (PF)   Quantity:  1 each   Refills:  0   Dose:  1 drop    Instructions:  Apply 1 drop to eye 4 (four) times daily as needed.     Begin Date    AM    Noon    PM    Bedtime       senna-docusate 8.6-50 mg 8.6-50 mg per tablet   Commonly known as:  PERICOLACE   Quantity:  60 tablet   Refills:  1   Dose:  1 tablet    Instructions:  Take 1 tablet by mouth 2 (two) times daily as needed for Constipation.     Begin Date    AM    Noon    PM    Bedtime       UNABLE TO FIND   Refills:  0   Dose:  20 mg    Instructions:  Take 20 mg by mouth once daily. Fluoextine     Begin Date    AM    Noon    PM    Bedtime       valacyclovir 1000 MG tablet   Commonly known as:  VALTREX   Quantity:  20 tablet   Refills:  0   Dose:  1000 mg    Instructions:  Take 1 tablet (1,000 mg total) by mouth 2 (two) times daily.     Begin Date    AM    Noon    PM    Bedtime       * Notice:  This list has 2 medication(s) that are the same as other medications prescribed for you. Read the directions carefully, and ask your doctor or other care provider to review them with you.               Por favor traiga a todos las citas de seguimiento:    1. Sade copia de las instrucciones de farhana.  2. Todos los medicamentos que está tomando julia momento, en niyah envases originales.  3. Identificación y tarjeta de seguro.    Por favor llegue 15 minutos antes de la hora de la mary.    Por favor llame con 24 horas de antelación si tiene que cambiar yadav mary y / o tiempo.        Your Scheduled Appointments     Mar 22, 2017  8:00 AM CDT   Post OP with Nate Fermin MD   [place] [facility] [place]    [address] [facility]  [place] [address]   [phone]            Mar 29, 2017  9:00 AM CDT   Post OP with Nate Fermin MD   [place] [facility] [place]    [address] [facility]  [place] [address]   [phone]            Apr 19, 2017 11:00 AM CDT  "  Post OP with Nate Fermin MD   [place] [facility] [place]    [address] [facility]  [place] [address] [phone]            Apr 28, 2017 10:20 AM CDT   Physical with Neftali Peters MD   [facility]    [address]  [place] [address] [phone]                Discharge Instructions     Future Orders    Other restrictions (specify):     Comments:    No heavy lifting or bending for 1 week.        Primary Diagnosis     Your primary diagnosis was:  Cataract      Admission Information     Date & Time Provider Department CSN    3/21/2017  5:43 AM Nate Fermin MD [facility] [id number]      Care Providers     Provider Role Specialty Primary office phone    Nate Fermin MD Attending Provider Ophthalmology [phone]    Nate Fermin MD Surgeon  Ophthalmology [phone]      Your Vitals Were     BP Pulse Temp Resp Height Weight    151/75 (BP Location: Left arm, Patient Position: Lying, BP Method: Automatic) 68 98.1 °F (36.7 °C) (Temporal) 18 5' 7" (1.702 m) 78 kg (172 lb)    SpO2 BMI                97% 26.94 kg/m2          Recent Lab Values     No lab values to display.      Allergies as of 3/21/2017     No Known Allergies      Advance Directives     An advance directive is a document which, in the event you are no longer able to make decisions for yourself, tells your healthcare team what kind of treatment you do or do not want to receive, or who you would like to make those decisions for you.  If you do not currently have an advance directive, [facility] encourages you to create one.  For more information call:  [phone]), [phone] ([phone]),  or log on to [email or web address].        Language Assistance Services     ATTENTION: Language assistance services are available, free of charge. Please call [phone].      ATENCIÓN: Si billy li " disposición servicios gratuitos de asistencia lingüística. Butch al [phone].     [facility] Ý: N?u b?n nói Ti?ng Vi?t, có các d?ch v? h? tr? ngôn ng? mi?n phí dành cho b?n. G?i s? [phone].        Diabetes Discharge Instructions                                   [facility] Sign-Up     Activating your [facility] account is as easy as 1-2-3!     1) Visit [email or web address], select Sign Up Now, enter this activation code and your date of birth, then select Next.  [id number]  Expires: 5/4/2017 11:00 AM      2) Create a username and password to use when you visit [facility] in the future and select a security question in case you lose your password and select Next.    3) Enter your e-mail address and click Sign Up!    Additional Information  If you have questions, please e-mail [email or web address] or call [phone] to talk to our [facility] staff. Remember, [facility] is NOT to be used for urgent needs. For medical emergencies, dial 911.          [facility] complies with applicable Federal civil rights laws and does not discriminate on the basis of race, color, national origin, age, disability, or sex.

## 2017-03-21 NOTE — ANESTHESIA POSTPROCEDURE EVALUATION
"Anesthesia Post Evaluation    Patient: Amanda Reyes    Procedure(s) Performed: Procedure(s) (LRB):  PHACOEMULSIFICATION-ASPIRATION-CATARACT (Right)  INSERTION-INTRAOCULAR LENS (IOL) (Right)    Final Anesthesia Type: general  Patient location during evaluation: PACU  Patient participation: Yes- Able to Participate  Level of consciousness: awake and alert  Post-procedure vital signs: reviewed and stable  Pain management: adequate  Airway patency: patent  PONV status at discharge: No PONV  Anesthetic complications: no      Cardiovascular status: hemodynamically stable and blood pressure returned to baseline  Respiratory status: unassisted and spontaneous ventilation  Hydration status: euvolemic  Follow-up not needed.        Visit Vitals    BP (!) 151/75 (BP Location: Left arm, Patient Position: Lying, BP Method: Automatic)    Pulse 68    Temp 36.7 °C (98.1 °F) (Temporal)    Resp 18    Ht 5' 7" (1.702 m)    Wt 78 kg (172 lb)    SpO2 97%    BMI 26.94 kg/m2       Pain/Benton Score: Pain Assessment Performed: Yes (3/21/2017  8:44 AM)  Presence of Pain: complains of pain/discomfort (3/21/2017  8:44 AM)  Pain Rating Prior to Med Admin: 5 (3/21/2017  8:57 AM)  Benton Score: 10 (3/21/2017  8:44 AM)      "

## 2017-03-21 NOTE — OP NOTE
DATE OF PROCEDURE:  03/21/2017    SURGEON:  Nate Fermin M.D.    PREOPERATIVE DIAGNOSIS:  Nuclear sclerotic cataract, right eye.    POSTOPERATIVE DIAGNOSIS:  Nuclear sclerotic cataract, right eye.    PROCEDURE:  Clear corneal phacoemulsification with posterior chamber intraocular   lens implant, right eye.    ANESTHESIA:  Monitored anesthesia care with 2% Xylocaine jelly topically, 1%   free lidocaine topically and intrachamberly.    PROCEDURE IN DETAIL:  After the appropriate preoperative consent was obtained,   the patient had the 2% Xylocaine jelly applied to the  cornea.  The patient was   then brought to the operating room and placed into the supine position.  The   right eye periorbit was then prepped and draped in the usual sterile ophthalmic   fashion.  A lid speculum was then inserted into the right eye.  Several drops of   the 1% lidocaine were placed onto the right eye cornea.  The 1% lidocaine was   also applied to the perilimbal region with the Weck-jermaine sponge.  Using the   0.12-mm forceps and the Super Sharp blade, a paracentesis site was made at the   12 o'clock position.  Approximately 0.5 mL of the 1% lidocaine was injected into   the anterior chamber.  Next, Viscoat was injected into the anterior chamber   through the paracentesis site.  The 2.75-mm keratome and the cyclodialysis   spatula were used to create a 2.75-mm clear corneal temporal groove.  The   cystitomy needle and Utrata forceps were then used to create a continuous tear   360-degree capsulorrhexis.  BSS in a cannula was then used for hydrodissection.    Phacoemulsification then proceeded in a stop-and-chop fashion without any   complications.  Another 0.5 mL of the 1% lidocaine was injected into the   anterior chamber.  The curved tip irrigation aspiration handpiece was then used   to remove the residual cortical material from the capsular bag.  Again, the 1%   lidocaine was applied to the perilimbal region with the Weck-jermaine  sponge.  Healon   GV was then injected into the anterior chamber and capsular bag.  An [employer] intraocular lens model SN60WF, 20.5 diopters in power, and serial   #[id number] was injected into the capsular bag with the lens injector.  The   Sinskey hook was used to position the lens into its proper place.  The residual   viscoelastic material was removed from the anterior chamber and capsular bag   with the curved tip irrigation aspiration handpiece.  Both wounds were hydrated   with BSS on a cannula.  Both wounds were checked and found to be watertight.    The lid speculum was then removed from the right eye.  The patient then had 1   drop of Vigamox ophthalmic drop and 1 drop of Econopred +1% ophthalmic drop   instilled onto the right eye cornea.  The eye was then shielded.  The patient   tolerated the procedure well and was then brought to the recovery room in good   condition.      MÓNICA  dd: 03/21/2017 21:32:52 (CDT)  td: 03/22/2017 01:25:48 (CDT)  Doc ID   #[id number]  Job ID #[id number]    CC:

## 2017-03-21 NOTE — BRIEF OP NOTE
Operative Note     SUMMARY     Surgery Date: 3/21/2017    Surgeon(s) and Role:      Nate Fermin MD - Primary    Pre-op Diagnosis:  Nuclear sclerosis     Post-op/ Diagnosis:  Same    Final Diagnosis: Cataract    Procedure(s) (LRB):  PHACOEMULSIFICATION-ASPIRATION-CATARACT   INSERTION-INTRAOCULAR LENS (IOL)     Anesthesia: Monitored Anesthesia Care    Findings/Key Components:  Cataract    Outcome: Tolerated procedure well    Estimated Blood Loss: None         Specimens     None          Follow-up:  Tomorrow in clinic      Discharge Note      SUMMARY     Admit Date: 3/21/2017    Attending Physician: Nate Fermin MD    Discharge Physician: Nate Fermin MD    Discharge Date: 3/21/2017    Final Diagnosis: Cataract    Outcome: Tolerated procedure well    Disposition: Discharge to Home.    Medications:       Amanda Reyes   Home Medication Instructions GABRIELLA:[id number]    Printed on:03/21/17 0845   Medication Information                      amlodipine (NORVASC) 5 MG tablet  Take 1 tablet (5 mg total) by mouth once daily.             diazepam (VALIUM) 5 MG tablet  Take 1 tablet (5 mg total) by mouth every 12 (twelve) hours as needed for Anxiety.             difluprednate (DUREZOL) 0.05 % Drop ophthalmic solution  Place 1 drop into the right eye 4 (four) times daily. For 30 days             enalapril (VASOTEC) 20 MG tablet  Take 1 tablet (20 mg total) by mouth once daily.             fesoterodine (TOVIAZ) 4 mg Tb24  Take 1 tablet (4 mg total) by mouth once daily.             fluoxetine (PROZAC) 20 MG capsule  Take 1 capsule (20 mg total) by mouth once daily.             GLUCOPHAGE 500 mg tablet               hydrochlorothiazide (HYDRODIURIL) 25 MG tablet  Take 1 tablet (25 mg total) by mouth once daily.             ILEVRO 0.3 % DrpS  Place 1 drop into both eyes once daily. For 30 days             lovastatin (MEVACOR) 20 MG tablet  Take 1 tablet (20 mg total) by mouth every evening.              metformin (GLUCOPHAGE) 500 MG tablet  Take 1 tablet (500 mg total) by mouth 2 (two) times daily with meals.             ofloxacin (OCUFLOX) 0.3 % ophthalmic solution  Place 1 drop into the right eye 4 (four) times daily.             omeprazole (PRILOSEC) 20 MG capsule  Take 1 capsule (20 mg total) by mouth before breakfast.             peg 400-propylene glycol, PF, (SYSTANE ULTRA, PF,) 0.4-0.3 % Dpet  Apply 1 drop to eye 4 (four) times daily as needed.             senna-docusate 8.6-50 mg (PERICOLACE) 8.6-50 mg per tablet  Take 1 tablet by mouth 2 (two) times daily as needed for Constipation.             UNABLE TO FIND  Take 20 mg by mouth once daily. Fluoextine             valacyclovir (VALTREX) 1000 MG tablet  Take 1 tablet (1,000 mg total) by mouth 2 (two) times daily.                   Patient Discharge Instructions:     Keep Jiménez Shield over operated eye when not using drops.     DIET:  Regular    Activity: No heavy lifting or bending X 1 week.    Follow-up:  Tomorrow in clinic

## 2017-03-22 ENCOUNTER — OFFICE VISIT (OUTPATIENT)
Dept: OPHTHALMOLOGY | Facility: CLINIC | Age: 67
End: 2017-03-22
Payer: MEDICARE

## 2017-03-22 VITALS — HEART RATE: 66 BPM | SYSTOLIC BLOOD PRESSURE: 131 MMHG | DIASTOLIC BLOOD PRESSURE: 78 MMHG

## 2017-03-22 DIAGNOSIS — Z98.890 POST-OPERATIVE STATE: Primary | ICD-10-CM

## 2017-03-22 PROCEDURE — 99999 PR PBB SHADOW E&M-EST. PATIENT-LVL III: CPT | Mod: PBBFAC,,, | Performed by: OPHTHALMOLOGY

## 2017-03-22 PROCEDURE — 99024 POSTOP FOLLOW-UP VISIT: CPT | Mod: S$GLB,,, | Performed by: OPHTHALMOLOGY

## 2017-03-22 NOTE — PROGRESS NOTES
S/p CE OU- Doing well.Subjective:       Patient ID: Amanda Reyes is a 67 y.o. female.    Chief Complaint: Post-op Evaluation (1 day PO OD. CE IOL 3/21/2017 OD )    HPI  Review of Systems    Objective:      Physical Exam    Assessment:       1. Post-operative state        Plan:       S/p CE OD- Doing well.             CPM OD.  RTC 1 wk.

## 2017-03-22 NOTE — MR AVS SNAPSHOT
[place] [facility]   [facility]  [place] [address]  Phone: [phone]  Fax: [phone]                  Amanda Reyes   3/22/2017 8:00 AM   Office Visit    Descripción:  Female : 1950   Personal Médico:  Nate Fermin MD   Departamento:  [place] [facility]           Razón de la mary     Post-op Evaluation           Diagnósticos de Esta Visita        Comentarios    Post-operative state    -  Primario            Lista de tareas           Citas próximas        Personal Médico Departamento Tfno del dpto    3/29/2017 9:00 AM Nate Fermin MD [place] [facility] [phone]    2017 11:00 AM Nate Fermin MD [place] [facility] [phone]    2017 10:20 AM Neftali Peters MD [facility] [phone]      Metas (5 Years of Data)     Ninguna      Follow-Up and Disposition     Return in about 1 week (around 3/29/2017) for Post-op Right eye.      [facility]     [facility] Enfermeras - Asistencia   Enfermeras registradas de [facility] pueden ayudarle a reservar agueda mary, proveer educación para la jake, asesoría clínica, y otros servicios de asesoramiento.   Llame para julia servicio gratuito a [phone].             Medicamentos           Mensaje sobre Medicamentos     Verificar los cambios y / o adiciones a yadav régimen de medicación son los mismos que discutir con yadav médico. Si cualquiera de estos cambios o adiciones son incorrectos, por favor notifique a yadav proveedor de atención médica.             Verifique que la siguiente lista de medicamentos es agueda representación exacta de los medicamentos que está tomando actualmente. Si no hay ningunos reportados, la lista puede estar en troy. Si no es correcta, por favor póngase en contacto con yadav proveedor de atención médica. Lleve esta lista con usted en dwaine de emergencia.           Medicamentos Actuales     amlodipine  (NORVASC) 5 MG tablet Take 1 tablet (5 mg total) by mouth once daily.    diazepam (VALIUM) 5 MG tablet Take 1 tablet (5 mg total) by mouth every 12 (twelve) hours as needed for Anxiety.    difluprednate (DUREZOL) 0.05 % Drop ophthalmic solution Place 1 drop into the right eye 4 (four) times daily. For 30 days    GLUCOPHAGE 500 mg tablet     hydrochlorothiazide (HYDRODIURIL) 25 MG tablet Take 1 tablet (25 mg total) by mouth once daily.    ILEVRO 0.3 % DrpS Place 1 drop into both eyes once daily. For 30 days    lovastatin (MEVACOR) 20 MG tablet Take 1 tablet (20 mg total) by mouth every evening.    metformin (GLUCOPHAGE) 500 MG tablet Take 1 tablet (500 mg total) by mouth 2 (two) times daily with meals.    ofloxacin (OCUFLOX) 0.3 % ophthalmic solution Place 1 drop into the right eye 4 (four) times daily.    peg 400-propylene glycol, PF, (SYSTANE ULTRA, PF,) 0.4-0.3 % Dpet Apply 1 drop to eye 4 (four) times daily as needed.    senna-docusate 8.6-50 mg (PERICOLACE) 8.6-50 mg per tablet Take 1 tablet by mouth 2 (two) times daily as needed for Constipation.    UNABLE TO FIND Take 20 mg by mouth once daily. Fluoextine    enalapril (VASOTEC) 20 MG tablet Take 1 tablet (20 mg total) by mouth once daily.    fesoterodine (TOVIAZ) 4 mg Tb24 Take 1 tablet (4 mg total) by mouth once daily.    fluoxetine (PROZAC) 20 MG capsule Take 1 capsule (20 mg total) by mouth once daily.    omeprazole (PRILOSEC) 20 MG capsule Take 1 capsule (20 mg total) by mouth before breakfast.    valacyclovir (VALTREX) 1000 MG tablet Take 1 tablet (1,000 mg total) by mouth 2 (two) times daily.           Información de referencia clínica           [place] signos vitales tara     PS Pulso                131/78 (BP Location: Right arm, Patient Position: Sitting, BP Method: Automatic) 66          Blood Pressure          Most Recent Value    BP  131/78      Alergias     A partir del:  3/22/2017        No Known Allergies      Vacunas     Administradas en la fecha  de la visita:  3/22/2017        None      Registrarse para [facility]     La activación de yadav cuenta [facility] es tan fácil prerna 1-2-3!    1) Ir a [email or web address], seleccione Registrarse Ahora, meter el código de activación y yadav fecha de nacimiento, y seleccione Próximo.    [id number]  Expires: 2017 11:00 AM      2) Crear un nombre de usuario y contraseña para usar cuando se visita [facility] en el futuro y selecciona agueda pregunta de seguridad en dwaine de que pierda yadav contraseña y seleccione Próximo.    3) Introduzca yadav dirección de correo electrónico y pooja clic en Registrarse!    Información Adicional  Si tiene alguna pregunta, por favor, e-mail [email or web address] o llame al [phone] para hablar con nuestro personal. Recuerde, [facility] no debe ser usada para necesidades urgentes. En dwaine de emergencia médica, llame al 911.        Language Assistance Services     ATTENTION: Language assistance services are available, free of charge. Please call [phone].      ATENCIÓN: Si habla español, tiene a yadav disposición servicios gratuitos de asistencia lingüística. Llame al [phone].     CHÚ Ý: N?u b?n nói Ti?ng Vi?t, có các d?ch v? h? tr? ngôn ng? mi?n phí dành cho b?n. G?i s? [phone].         [place] [facility] cumple con las leyes federales aplicables de derechos civiles y no discrimina por motivos de luz, color, origen nacional, edad, discapacidad, o sexo.                 Amanda Reyes   3/22/2017 8:00 AM   Office Visit    Description:  Female : 1950   Provider:  Nate Fermin MD   Department:  [place] [facility]           Reason for Visit     Post-op Evaluation           Diagnoses this Visit        Comments    Post-operative state    -  Primary            To Do List           Future Appointments        Provider Department Dept Phone    3/29/2017 9:00 AM Nate Fermin MD [place] [facility] [phone]    2017 11:00 AM Nate  ELIOT Fermin MD [place] [facility] [phone]    4/28/2017 10:20 AM Neftali Peters MD [facility] [phone]      Goals     None      Follow-Up and Disposition     Return in about 1 week (around 3/29/2017) for Post-op Right eye.      [facility] On Call     [facility] On Call Nurse Care Line - 24/7 Assistance  Registered nurses in the [facility] On Call Center provide clinical advisement, health education, appointment booking, and other advisory services.  Call for this free service at [phone].             Medications           Message regarding Medications     Verify the changes and/or additions to your medication regime listed below are the same as discussed with your clinician today.  If any of these changes or additions are incorrect, please notify your healthcare provider.             Verify that the below list of medications is an accurate representation of the medications you are currently taking.  If none reported, the list may be blank. If incorrect, please contact your healthcare provider. Carry this list with you in case of emergency.           Current Medications     amlodipine (NORVASC) 5 MG tablet Take 1 tablet (5 mg total) by mouth once daily.    diazepam (VALIUM) 5 MG tablet Take 1 tablet (5 mg total) by mouth every 12 (twelve) hours as needed for Anxiety.    difluprednate (DUREZOL) 0.05 % Drop ophthalmic solution Place 1 drop into the right eye 4 (four) times daily. For 30 days    GLUCOPHAGE 500 mg tablet     hydrochlorothiazide (HYDRODIURIL) 25 MG tablet Take 1 tablet (25 mg total) by mouth once daily.    ILEVRO 0.3 % DrpS Place 1 drop into both eyes once daily. For 30 days    lovastatin (MEVACOR) 20 MG tablet Take 1 tablet (20 mg total) by mouth every evening.    metformin (GLUCOPHAGE) 500 MG tablet Take 1 tablet (500 mg total) by mouth 2 (two) times daily with meals.    ofloxacin (OCUFLOX) 0.3 % ophthalmic solution Place 1 drop into the right eye 4 (four) times  daily.    peg 400-propylene glycol, PF, (SYSTANE ULTRA, PF,) 0.4-0.3 % Dpet Apply 1 drop to eye 4 (four) times daily as needed.    senna-docusate 8.6-50 mg (PERICOLACE) 8.6-50 mg per tablet Take 1 tablet by mouth 2 (two) times daily as needed for Constipation.    UNABLE TO FIND Take 20 mg by mouth once daily. Fluoextine    enalapril (VASOTEC) 20 MG tablet Take 1 tablet (20 mg total) by mouth once daily.    fesoterodine (TOVIAZ) 4 mg Tb24 Take 1 tablet (4 mg total) by mouth once daily.    fluoxetine (PROZAC) 20 MG capsule Take 1 capsule (20 mg total) by mouth once daily.    omeprazole (PRILOSEC) 20 MG capsule Take 1 capsule (20 mg total) by mouth before breakfast.    valacyclovir (VALTREX) 1000 MG tablet Take 1 tablet (1,000 mg total) by mouth 2 (two) times daily.           Clinical Reference Information           Your Vitals Were     BP Pulse                131/78 (BP Location: Right arm, Patient Position: Sitting, BP Method: Automatic) 66          Blood Pressure          Most Recent Value    BP  131/78      Allergies as of 3/22/2017     No Known Allergies      Immunizations Administered on Date of Encounter - 3/22/2017     None      [facility] Sign-Up     Activating your [facility] account is as easy as 1-2-3!     1) Visit [email or web address], select Sign Up Now, enter this activation code and your date of birth, then select Next.  [email or web address]  Expires: 5/4/2017 11:00 AM      2) Create a username and password to use when you visit [facility] in the future and select a security question in case you lose your password and select Next.    3) Enter your e-mail address and click Sign Up!    Additional Information  If you have questions, please e-mail [email or web address] or call [phone] to talk to our [facility] staff. Remember, [facility] is NOT to be used for urgent needs. For medical emergencies, dial 911.         Language Assistance Services     ATTENTION: Language assistance services are available, free of  charge. Please call [phone].      ATENCIÓN: Si habla español, tiene a yadav disposición servicios gratuitos de asistencia lingüística. Llame al [phone].     CHÚ Ý: N?u b?n nói Ti?ng Vi?t, có các d?ch v? h? tr? ngôn ng? mi?n phí dành cho b?n. G?i s? [phone].         [place] [facility] complies with applicable Federal civil rights laws and does not discriminate on the basis of race, color, national origin, age, disability, or sex.

## 2017-03-29 ENCOUNTER — OFFICE VISIT (OUTPATIENT)
Dept: OPHTHALMOLOGY | Facility: CLINIC | Age: 67
End: 2017-03-29
Payer: MEDICARE

## 2017-03-29 DIAGNOSIS — Z96.1 PSEUDOPHAKIA: ICD-10-CM

## 2017-03-29 DIAGNOSIS — Z98.890 POST-OPERATIVE STATE: Primary | ICD-10-CM

## 2017-03-29 PROCEDURE — 99024 POSTOP FOLLOW-UP VISIT: CPT | Mod: S$GLB,,, | Performed by: OPHTHALMOLOGY

## 2017-03-29 PROCEDURE — 99999 PR PBB SHADOW E&M-EST. PATIENT-LVL II: CPT | Mod: PBBFAC,,, | Performed by: OPHTHALMOLOGY

## 2017-03-29 NOTE — MR AVS SNAPSHOT
[place] [facility]   [facility]  [place] [address]  Phone: [phone]  Fax: [phone]                  Amanda Reyes   3/29/2017 9:00 AM   Office Visit    Descripción:  Female : 1950   Personal Médico:  Nate Fermin MD   Departamento:  [place] [facility]           Razón de la mary     Post-op Evaluation           Diagnósticos de Esta Visita        Comentarios    Post-operative state    -  Primario     Pseudophakia                Lista de tareas           Citas próximas        Personal Médico Departamento Tfno del dpto    2017 11:00 AM Nate Fermin MD [place] [facility] [phone]    2017 10:20 AM Neftali Peters MD [facility] [phone]      Metas (5 Years of Data)     Ninguna      Follow-Up and Disposition     Return in about 3 weeks (around 2017) for Post-op Right eye.      [facility]     [facility] Enfermeras - Asistencia   Enfermeras registradas de [facility] pueden ayudarle a reservar agudea mary, proveer educación para la jake, asesoría clínica, y otros servicios de asesoramiento.   Llame para julia servicio gratuito a [phone].             Medicamentos           Mensaje sobre Medicamentos     Verificar los cambios y / o adiciones a yadav régimen de medicación son los mismos que discutir con yadav médico. Si cualquiera de estos cambios o adiciones son incorrectos, por favor notifique a yadav proveedor de atención médica.             Verifique que la siguiente lista de medicamentos es agueda representación exacta de los medicamentos que está tomando actualmente. Si no hay ningunos reportados, la lista puede estar en troy. Si no es correcta, por favor póngase en contacto con yadav proveedor de atención médica. Lleve esta lista con usted en dwaine de emergencia.           Medicamentos Actuales     amlodipine (NORVASC) 5 MG tablet Take 1 tablet (5 mg total) by mouth once  daily.    diazepam (VALIUM) 5 MG tablet Take 1 tablet (5 mg total) by mouth every 12 (twelve) hours as needed for Anxiety.    difluprednate (DUREZOL) 0.05 % Drop ophthalmic solution Place 1 drop into the right eye 4 (four) times daily. For 30 days    GLUCOPHAGE 500 mg tablet     hydrochlorothiazide (HYDRODIURIL) 25 MG tablet Take 1 tablet (25 mg total) by mouth once daily.    ILEVRO 0.3 % DrpS Place 1 drop into both eyes once daily. For 30 days    lovastatin (MEVACOR) 20 MG tablet Take 1 tablet (20 mg total) by mouth every evening.    metformin (GLUCOPHAGE) 500 MG tablet Take 1 tablet (500 mg total) by mouth 2 (two) times daily with meals.    ofloxacin (OCUFLOX) 0.3 % ophthalmic solution Place 1 drop into the right eye 4 (four) times daily.    peg 400-propylene glycol, PF, (SYSTANE ULTRA, PF,) 0.4-0.3 % Dpet Apply 1 drop to eye 4 (four) times daily as needed.    senna-docusate 8.6-50 mg (PERICOLACE) 8.6-50 mg per tablet Take 1 tablet by mouth 2 (two) times daily as needed for Constipation.    UNABLE TO FIND Take 20 mg by mouth once daily. Fluoextine    enalapril (VASOTEC) 20 MG tablet Take 1 tablet (20 mg total) by mouth once daily.    fesoterodine (TOVIAZ) 4 mg Tb24 Take 1 tablet (4 mg total) by mouth once daily.    fluoxetine (PROZAC) 20 MG capsule Take 1 capsule (20 mg total) by mouth once daily.    omeprazole (PRILOSEC) 20 MG capsule Take 1 capsule (20 mg total) by mouth before breakfast.    valacyclovir (VALTREX) 1000 MG tablet Take 1 tablet (1,000 mg total) by mouth 2 (two) times daily.           Información de referencia clínica           Alergias     A partir del:  3/29/2017        No Known Allergies      Vacunas     Administradas en la fecha de la visita:  3/29/2017        None      Registrarse para [facility]     La activación de yadav cuenta [facility] es tan fácil prerna 1-2-3!    1) Ir a [email or web address], seleccione Registrarse Ahora, meter el código de activación y yadav fecha de nacimiento, y seleccione  Próximo.    [id number]  Expires: 2017 11:00 AM      2) Crear un nombre de usuario y contraseña para usar cuando se visita [facility] en el futuro y selecciona agueda pregunta de seguridad en dwaine de que pierda yadav contraseña y seleccione Próximo.    3) Introduzca yadav dirección de correo electrónico y pooja cljazz en Registrarse!    Información Adicional  Si tiene alguna pregunta, por favor, e-mail [email or web address] o llame al [phone] para hablar con nuestro personal. Recuerde, [facility] no debe ser usada para necesidades urgentes. En dwaine de emergencia médica, llame al 911.        Language Assistance Services     ATTENTION: Language assistance services are available, free of charge. Please call [phone].      ATENCIÓN: Si habla español, tiene a yadav disposición servicios gratuitos de asistencia lingüística. Llame al [phone].     CHÚ Ý: N?u b?n nói Ti?ng Vi?t, có các d?ch v? h? tr? ngôn ng? mi?n phí dành cho b?n. G?i s? [phone].         [place] [facility] cumple con las leyes federales aplicables de derechos civiles y no discrimina por motivos de luz, color, origen nacional, edad, discapacidad, o sexo.                 Amanda Reyes   3/29/2017 9:00 AM   Office Visit    Description:  Female : 1950   Provider:  Nate Fermin MD   Department:  [place] [facility]           Reason for Visit     Post-op Evaluation           Diagnoses this Visit        Comments    Post-operative state    -  Primary     Pseudophakia                To Do List           Future Appointments        Provider Department Dept Phone    2017 11:00 AM Nate Fermin MD [place] [facility] [phone]    2017 10:20 AM Neftali Peters MD [facility] [phone]      Goals     None      Follow-Up and Disposition     Return in about 3 weeks (around 2017) for Post-op Right eye.      [facility]     [facility] Call Nurse Care Line -  24/7 Assistance  Registered nurses in the [facility] provide clinical advisement, health education, appointment booking, and other advisory services.  Call for this free service at [phone].             Medications           Message regarding Medications     Verify the changes and/or additions to your medication regime listed below are the same as discussed with your clinician today.  If any of these changes or additions are incorrect, please notify your healthcare provider.             Verify that the below list of medications is an accurate representation of the medications you are currently taking.  If none reported, the list may be blank. If incorrect, please contact your healthcare provider. Carry this list with you in case of emergency.           Current Medications     amlodipine (NORVASC) 5 MG tablet Take 1 tablet (5 mg total) by mouth once daily.    diazepam (VALIUM) 5 MG tablet Take 1 tablet (5 mg total) by mouth every 12 (twelve) hours as needed for Anxiety.    difluprednate (DUREZOL) 0.05 % Drop ophthalmic solution Place 1 drop into the right eye 4 (four) times daily. For 30 days    GLUCOPHAGE 500 mg tablet     hydrochlorothiazide (HYDRODIURIL) 25 MG tablet Take 1 tablet (25 mg total) by mouth once daily.    ILEVRO 0.3 % DrpS Place 1 drop into both eyes once daily. For 30 days    lovastatin (MEVACOR) 20 MG tablet Take 1 tablet (20 mg total) by mouth every evening.    metformin (GLUCOPHAGE) 500 MG tablet Take 1 tablet (500 mg total) by mouth 2 (two) times daily with meals.    ofloxacin (OCUFLOX) 0.3 % ophthalmic solution Place 1 drop into the right eye 4 (four) times daily.    peg 400-propylene glycol, PF, (SYSTANE ULTRA, PF,) 0.4-0.3 % Dpet Apply 1 drop to eye 4 (four) times daily as needed.    senna-docusate 8.6-50 mg (PERICOLACE) 8.6-50 mg per tablet Take 1 tablet by mouth 2 (two) times daily as needed for Constipation.    UNABLE TO FIND Take 20 mg by mouth once daily. Fluoextine     enalapril (VASOTEC) 20 MG tablet Take 1 tablet (20 mg total) by mouth once daily.    fesoterodine (TOVIAZ) 4 mg Tb24 Take 1 tablet (4 mg total) by mouth once daily.    fluoxetine (PROZAC) 20 MG capsule Take 1 capsule (20 mg total) by mouth once daily.    omeprazole (PRILOSEC) 20 MG capsule Take 1 capsule (20 mg total) by mouth before breakfast.    valacyclovir (VALTREX) 1000 MG tablet Take 1 tablet (1,000 mg total) by mouth 2 (two) times daily.           Clinical Reference Information           Allergies as of 3/29/2017     No Known Allergies      Immunizations Administered on Date of Encounter - 3/29/2017     None      [facility] Sign-Up     Activating your [facility] account is as easy as 1-2-3!     1) Visit [email or web address], select Sign Up Now, enter this activation code and your date of birth, then select Next.  [id number]  Expires: 5/4/2017 11:00 AM      2) Create a username and password to use when you visit [facility] in the future and select a security question in case you lose your password and select Next.    3) Enter your e-mail address and click Sign Up!    Additional Information  If you have questions, please e-mail [email or web address] or call [phone] to talk to our [facility] staff. Remember, [facility] is NOT to be used for urgent needs. For medical emergencies, dial 911.         Language Assistance Services     ATTENTION: Language assistance services are available, free of charge. Please call [phone].      ATENCIÓN: Si habla español, tiene a yadav disposición servicios gratuitos de asistencia lingüística. Llame al [phone].     MULU Ý: N?u b?n nói Ti?ng Vi?t, có các d?ch v? h? tr? ngôn ng? mi?n phí dành cho b?n. G?i s? [phone].         [place] [facility] complies with applicable Federal civil rights laws and does not discriminate on the basis of race, color, national origin, age, disability, or sex.

## 2017-03-29 NOTE — PROGRESS NOTES
Subjective:       Patient ID: Amanda Reyes is a 67 y.o. female.    Chief Complaint: Post-op Evaluation (1 week PO OD only)    HPI  Review of Systems    Objective:      Physical Exam    Assessment:       1. Post-operative state    2. Pseudophakia        Plan:       S/p CE OD- Doing well.           Taper gtts OD.  AT's.  RTC 3 wks.

## 2017-04-19 ENCOUNTER — OFFICE VISIT (OUTPATIENT)
Dept: OPHTHALMOLOGY | Facility: CLINIC | Age: 67
End: 2017-04-19
Payer: MEDICARE

## 2017-04-19 DIAGNOSIS — Z96.1 PSEUDOPHAKIA: ICD-10-CM

## 2017-04-19 DIAGNOSIS — Z98.890 POST-OPERATIVE STATE: Primary | ICD-10-CM

## 2017-04-19 DIAGNOSIS — H52.7 REFRACTIVE ERROR: ICD-10-CM

## 2017-04-19 PROCEDURE — 99024 POSTOP FOLLOW-UP VISIT: CPT | Mod: S$GLB,,, | Performed by: OPHTHALMOLOGY

## 2017-04-19 PROCEDURE — 99999 PR PBB SHADOW E&M-EST. PATIENT-LVL II: CPT | Mod: PBBFAC,,, | Performed by: OPHTHALMOLOGY

## 2017-04-19 NOTE — PROGRESS NOTES
Subjective:       Patient ID: Amanda Reyes is a 67 y.o. female.    Chief Complaint: Post-op Evaluation (3 weeks po os)    HPI  Review of Systems    Objective:      Physical Exam    Assessment:       1. Post-operative state    2. Pseudophakia    3. Refractive error        Plan:       S/p CE OS- Doing well.     RE-Pt does not want MRx.      Readers.  RTC Dr Alexandre in 1 yr (Pt is returning to [place] for several months).

## 2017-04-19 NOTE — MR AVS SNAPSHOT
[place] [facility]   [facility]  [place] [address]  Phone: [phone]  Fax: [phone]                  Amanda Reyes   2017 11:00 AM   Office Visit    Descripción:  Female : 1950   Personal Médico:  Nate Fermin MD   Departamento:  [place] [facility]           Razón de la mary     Post-op Evaluation           Diagnósticos de Esta Visita        Comentarios    Post-operative state    -  Primario     Pseudophakia         Refractive error                Lista de tareas           Citas próximas        Personal Médico Departamento Tfno del dpto    2017 10:20 AM Neftali Peters MD [facility] [phone]      Metas (5 Years of Data)     Ninguna      Follow-Up and Disposition     Return in about 1 year (around 2018) for Dr Alexandre, F/U S/P CE OS..      [facility]     [facility] de Enfermeras - Asistencia   Enfermeras registradas de [facility] pueden ayudarle a reservar agueda mary, proveer educación para la jake, asesoría clínica, y otros servicios de asesoramiento.   Llame para julia servicio gratuito a [phone].             Medicamentos           Mensaje sobre Medicamentos     Verificar los cambios y / o adiciones a yadav régimen de medicación son los mismos que discutir con yadav médico. Si cualquiera de estos cambios o adiciones son incorrectos, por favor notifique a yadav proveedor de atención médica.             Verifique que la siguiente lista de medicamentos es agueda representación exacta de los medicamentos que está tomando actualmente. Si no hay ningunos reportados, la lista puede estar en troy. Si no es correcta, por favor póngase en contacto con yadav proveedor de atención médica. Lleve esta lista con usted en dwaine de emergencia.           Medicamentos Actuales     diazepam (VALIUM) 5 MG tablet Take 1 tablet (5 mg total) by mouth every 12 (twelve) hours as needed for Anxiety.     difluprednate (DUREZOL) 0.05 % Drop ophthalmic solution Place 1 drop into the right eye 4 (four) times daily. For 30 days    GLUCOPHAGE 500 mg tablet     hydrochlorothiazide (HYDRODIURIL) 25 MG tablet Take 1 tablet (25 mg total) by mouth once daily.    lovastatin (MEVACOR) 20 MG tablet Take 1 tablet (20 mg total) by mouth every evening.    metformin (GLUCOPHAGE) 500 MG tablet Take 1 tablet (500 mg total) by mouth 2 (two) times daily with meals.    peg 400-propylene glycol, PF, (SYSTANE ULTRA, PF,) 0.4-0.3 % Dpet Apply 1 drop to eye 4 (four) times daily as needed.    senna-docusate 8.6-50 mg (PERICOLACE) 8.6-50 mg per tablet Take 1 tablet by mouth 2 (two) times daily as needed for Constipation.    UNABLE TO FIND Take 20 mg by mouth once daily. Fluoextine    amlodipine (NORVASC) 5 MG tablet Take 1 tablet (5 mg total) by mouth once daily.    enalapril (VASOTEC) 20 MG tablet Take 1 tablet (20 mg total) by mouth once daily.    fesoterodine (TOVIAZ) 4 mg Tb24 Take 1 tablet (4 mg total) by mouth once daily.    fluoxetine (PROZAC) 20 MG capsule Take 1 capsule (20 mg total) by mouth once daily.    ILEVRO 0.3 % DrpS Place 1 drop into both eyes once daily. For 30 days    omeprazole (PRILOSEC) 20 MG capsule Take 1 capsule (20 mg total) by mouth before breakfast.    valacyclovir (VALTREX) 1000 MG tablet Take 1 tablet (1,000 mg total) by mouth 2 (two) times daily.           Información de referencia clínica           Alergias     A partir del:  4/19/2017        No Known Allergies      Vacunas     Administradas en la fecha de la visita:  4/19/2017        None      Registrarse para [facility]     La activación de yadav cuenta [facility] es tan fácil prerna 1-2-3!    1) Ir a [email or web address], seleccione Registrarse Ahora, meter el código de activación y yadav fecha de nacimiento, y seleccione Próximo.    [id number]  Expires: 5/4/2017 11:00 AM      2) Crear un nombre de usuario y contraseña para usar cuando se visita [facility] en el  futuro y selecciona agueda pregunta de seguridad en dwaine de que pierda yadav contraseña y seleccione Próximo.    3) Introduzca yadav dirección de correo electrónico y pooja carleen en Registrarse!    Información Adicional  Si tiene alguna pregunta, por favor, e-mail [email or web address] o llame al [phone] para hablar con nuestro personal. Recuerde, [facility] no debe ser usada para necesidades urgentes. En dwaine de emergencia médica, llame al 911.        Language Assistance Services     ATTENTION: Language assistance services are available, free of charge. Please call [phone].      ATENCIÓN: Si habla español, tiene a yadav disposición servicios gratuitos de asistencia lingüística. Llame al [phone].     CHÚ Ý: N?u b?n nói Ti?ng Vi?t, có các d?ch v? h? tr? ngôn ng? mi?n phí dành cho b?n. G?i s? [phone].         [place] [facility] cumple con las leyes federales aplicables de derechos civiles y no discrimina por motivos de luz, color, origen nacional, edad, discapacidad, o sexo.                 Amanda Reyes   2017 11:00 AM   Office Visit    Description:  Female : 1950   Provider:  Nate Fermin MD   Department:  [place] [facility]           Reason for Visit     Post-op Evaluation           Diagnoses this Visit        Comments    Post-operative state    -  Primary     Pseudophakia         Refractive error                To Do List           Future Appointments        Provider Department Dept Phone    2017 10:20 AM Neftali Peters MD [facility] [phone]      Goals     None      Follow-Up and Disposition     Return in about 1 year (around 2018) for Dr Alexandre, F/U S/P CE OS..      [facility]     [facility] -  Assistance  Unless otherwise directed by your provider, please contact [facility], our nurse care line that is available for  assistance.     Registered nurses in the [facility]  Center provide: appointment scheduling, clinical advisement, health education, and other advisory services.  Call: [phone] (toll free)               Medications           Message regarding Medications     Verify the changes and/or additions to your medication regime listed below are the same as discussed with your clinician today.  If any of these changes or additions are incorrect, please notify your healthcare provider.             Verify that the below list of medications is an accurate representation of the medications you are currently taking.  If none reported, the list may be blank. If incorrect, please contact your healthcare provider. Carry this list with you in case of emergency.           Current Medications     diazepam (VALIUM) 5 MG tablet Take 1 tablet (5 mg total) by mouth every 12 (twelve) hours as needed for Anxiety.    difluprednate (DUREZOL) 0.05 % Drop ophthalmic solution Place 1 drop into the right eye 4 (four) times daily. For 30 days    GLUCOPHAGE 500 mg tablet     hydrochlorothiazide (HYDRODIURIL) 25 MG tablet Take 1 tablet (25 mg total) by mouth once daily.    lovastatin (MEVACOR) 20 MG tablet Take 1 tablet (20 mg total) by mouth every evening.    metformin (GLUCOPHAGE) 500 MG tablet Take 1 tablet (500 mg total) by mouth 2 (two) times daily with meals.    peg 400-propylene glycol, PF, (SYSTANE ULTRA, PF,) 0.4-0.3 % Dpet Apply 1 drop to eye 4 (four) times daily as needed.    senna-docusate 8.6-50 mg (PERICOLACE) 8.6-50 mg per tablet Take 1 tablet by mouth 2 (two) times daily as needed for Constipation.    UNABLE TO FIND Take 20 mg by mouth once daily. Fluoextine    amlodipine (NORVASC) 5 MG tablet Take 1 tablet (5 mg total) by mouth once daily.    enalapril (VASOTEC) 20 MG tablet Take 1 tablet (20 mg total) by mouth once daily.    fesoterodine (TOVIAZ) 4 mg Tb24 Take 1 tablet (4 mg total) by mouth once daily.    fluoxetine (PROZAC) 20 MG capsule Take 1 capsule (20 mg total) by mouth  once daily.    ILEVRO 0.3 % DrpS Place 1 drop into both eyes once daily. For 30 days    omeprazole (PRILOSEC) 20 MG capsule Take 1 capsule (20 mg total) by mouth before breakfast.    valacyclovir (VALTREX) 1000 MG tablet Take 1 tablet (1,000 mg total) by mouth 2 (two) times daily.           Clinical Reference Information           Allergies as of 4/19/2017     No Known Allergies      Immunizations Administered on Date of Encounter - 4/19/2017     None      [facility] Sign-Up     Activating your [facility] account is as easy as 1-2-3!     1) Visit [email or web address], select Sign Up Now, enter this activation code and your date of birth, then select Next.  [id number]  Expires: 5/4/2017 11:00 AM      2) Create a username and password to use when you visit [facility] in the future and select a security question in case you lose your password and select Next.    3) Enter your e-mail address and click Sign Up!    Additional Information  If you have questions, please e-mail [email or web address] or call [phone] to talk to our [facility] staff. Remember, [facility] is NOT to be used for urgent needs. For medical emergencies, dial 911.         Language Assistance Services     ATTENTION: Language assistance services are available, free of charge. Please call [phone].      ATENCIÓN: Si habla español, tiene a yadav disposición servicios gratuitos de asistencia lingüística. Llame al [phone].     CHÚ Ý: N?u b?n nói Ti?ng Vi?t, có các d?ch v? h? tr? ngôn ng? mi?n phí dành cho b?n. G?i s? [phone].         [place] [facility] complies with applicable Federal civil rights laws and does not discriminate on the basis of race, color, national origin, age, disability, or sex.

## 2017-04-28 ENCOUNTER — LAB VISIT (OUTPATIENT)
Dept: LAB | Facility: HOSPITAL | Age: 67
End: 2017-04-28
Attending: FAMILY MEDICINE
Payer: MEDICARE

## 2017-04-28 ENCOUNTER — OFFICE VISIT (OUTPATIENT)
Dept: FAMILY MEDICINE | Facility: CLINIC | Age: 67
End: 2017-04-28
Payer: MEDICARE

## 2017-04-28 VITALS
SYSTOLIC BLOOD PRESSURE: 134 MMHG | HEIGHT: 67 IN | WEIGHT: 172.19 LBS | DIASTOLIC BLOOD PRESSURE: 82 MMHG | HEART RATE: 67 BPM | BODY MASS INDEX: 27.02 KG/M2 | OXYGEN SATURATION: 97 %

## 2017-04-28 DIAGNOSIS — B35.6 TINEA CRURIS: ICD-10-CM

## 2017-04-28 DIAGNOSIS — E78.5 DYSLIPIDEMIA: ICD-10-CM

## 2017-04-28 DIAGNOSIS — I10 ESSENTIAL HYPERTENSION: ICD-10-CM

## 2017-04-28 DIAGNOSIS — R41.89 COGNITIVE DEFICITS: ICD-10-CM

## 2017-04-28 DIAGNOSIS — Z00.00 ANNUAL PHYSICAL EXAM: Primary | ICD-10-CM

## 2017-04-28 DIAGNOSIS — T78.3XXD ANGIOEDEMA, SUBSEQUENT ENCOUNTER: ICD-10-CM

## 2017-04-28 DIAGNOSIS — R32 URINARY INCONTINENCE, UNSPECIFIED TYPE: ICD-10-CM

## 2017-04-28 DIAGNOSIS — E11.65 TYPE 2 DIABETES MELLITUS WITH HYPERGLYCEMIA, WITHOUT LONG-TERM CURRENT USE OF INSULIN: ICD-10-CM

## 2017-04-28 DIAGNOSIS — K21.9 GASTROESOPHAGEAL REFLUX DISEASE, ESOPHAGITIS PRESENCE NOT SPECIFIED: ICD-10-CM

## 2017-04-28 DIAGNOSIS — F33.1 MODERATE EPISODE OF RECURRENT MAJOR DEPRESSIVE DISORDER: ICD-10-CM

## 2017-04-28 DIAGNOSIS — T78.40XA ALLERGIC REACTION, INITIAL ENCOUNTER: ICD-10-CM

## 2017-04-28 LAB
ALBUMIN SERPL BCP-MCNC: 4.1 G/DL
ALP SERPL-CCNC: 101 U/L
ALT SERPL W/O P-5'-P-CCNC: 16 U/L
ANION GAP SERPL CALC-SCNC: 11 MMOL/L
AST SERPL-CCNC: 17 U/L
BACTERIA #/AREA URNS AUTO: NORMAL /HPF
BASOPHILS # BLD AUTO: 0.04 K/UL
BASOPHILS NFR BLD: 0.5 %
BILIRUB SERPL-MCNC: 0.5 MG/DL
BILIRUB UR QL STRIP: NEGATIVE
BUN SERPL-MCNC: 12 MG/DL
CALCIUM SERPL-MCNC: 9.9 MG/DL
CHLORIDE SERPL-SCNC: 96 MMOL/L
CHOLEST/HDLC SERPL: 3.5 {RATIO}
CLARITY UR REFRACT.AUTO: CLEAR
CO2 SERPL-SCNC: 26 MMOL/L
COLOR UR AUTO: YELLOW
CREAT SERPL-MCNC: 1.1 MG/DL
DIFFERENTIAL METHOD: NORMAL
EOSINOPHIL # BLD AUTO: 0.3 K/UL
EOSINOPHIL NFR BLD: 4.2 %
ERYTHROCYTE [DISTWIDTH] IN BLOOD BY AUTOMATED COUNT: 12.8 %
EST. GFR  (AFRICAN AMERICAN): >60 ML/MIN/1.73 M^2
EST. GFR  (NON AFRICAN AMERICAN): 52.1 ML/MIN/1.73 M^2
GLUCOSE SERPL-MCNC: 392 MG/DL
GLUCOSE UR QL STRIP: ABNORMAL
HCT VFR BLD AUTO: 39.3 %
HDL/CHOLESTEROL RATIO: 28.6 %
HDLC SERPL-MCNC: 234 MG/DL
HDLC SERPL-MCNC: 67 MG/DL
HGB BLD-MCNC: 13.9 G/DL
HGB UR QL STRIP: NEGATIVE
KETONES UR QL STRIP: NEGATIVE
LDLC SERPL CALC-MCNC: 120.6 MG/DL
LEUKOCYTE ESTERASE UR QL STRIP: NEGATIVE
LYMPHOCYTES # BLD AUTO: 2.3 K/UL
LYMPHOCYTES NFR BLD: 29.2 %
MCH RBC QN AUTO: 29.6 PG
MCHC RBC AUTO-ENTMCNC: 35.4 %
MCV RBC AUTO: 84 FL
MICROSCOPIC COMMENT: NORMAL
MONOCYTES # BLD AUTO: 0.4 K/UL
MONOCYTES NFR BLD: 4.7 %
NEUTROPHILS # BLD AUTO: 4.7 K/UL
NEUTROPHILS NFR BLD: 61.3 %
NITRITE UR QL STRIP: NEGATIVE
NONHDLC SERPL-MCNC: 167 MG/DL
PH UR STRIP: 5 [PH] (ref 5–8)
PLATELET # BLD AUTO: 205 K/UL
PMV BLD AUTO: 10.7 FL
POTASSIUM SERPL-SCNC: 4.3 MMOL/L
PROT SERPL-MCNC: 7.6 G/DL
PROT UR QL STRIP: NEGATIVE
RBC # BLD AUTO: 4.69 M/UL
SODIUM SERPL-SCNC: 133 MMOL/L
SP GR UR STRIP: >=1.03 (ref 1–1.03)
SQUAMOUS #/AREA URNS AUTO: 1 /HPF
TRIGL SERPL-MCNC: 232 MG/DL
TSH SERPL DL<=0.005 MIU/L-ACNC: 1.07 UIU/ML
URN SPEC COLLECT METH UR: ABNORMAL
UROBILINOGEN UR STRIP-ACNC: NEGATIVE EU/DL
WBC # BLD AUTO: 7.71 K/UL
WBC #/AREA URNS AUTO: 1 /HPF (ref 0–5)
YEAST UR QL AUTO: NORMAL

## 2017-04-28 PROCEDURE — 80053 COMPREHEN METABOLIC PANEL: CPT

## 2017-04-28 PROCEDURE — 1160F RVW MEDS BY RX/DR IN RCRD: CPT | Mod: S$GLB,,, | Performed by: FAMILY MEDICINE

## 2017-04-28 PROCEDURE — 99499 UNLISTED E&M SERVICE: CPT | Mod: S$GLB,,, | Performed by: FAMILY MEDICINE

## 2017-04-28 PROCEDURE — 1126F AMNT PAIN NOTED NONE PRSNT: CPT | Mod: S$GLB,,, | Performed by: FAMILY MEDICINE

## 2017-04-28 PROCEDURE — 84443 ASSAY THYROID STIM HORMONE: CPT

## 2017-04-28 PROCEDURE — 36415 COLL VENOUS BLD VENIPUNCTURE: CPT | Mod: PO

## 2017-04-28 PROCEDURE — 83036 HEMOGLOBIN GLYCOSYLATED A1C: CPT

## 2017-04-28 PROCEDURE — 99214 OFFICE O/P EST MOD 30 MIN: CPT | Mod: S$GLB,,, | Performed by: FAMILY MEDICINE

## 2017-04-28 PROCEDURE — 85025 COMPLETE CBC W/AUTO DIFF WBC: CPT

## 2017-04-28 PROCEDURE — 80061 LIPID PANEL: CPT

## 2017-04-28 PROCEDURE — 3079F DIAST BP 80-89 MM HG: CPT | Mod: S$GLB,,, | Performed by: FAMILY MEDICINE

## 2017-04-28 PROCEDURE — 4010F ACE/ARB THERAPY RXD/TAKEN: CPT | Mod: S$GLB,,, | Performed by: FAMILY MEDICINE

## 2017-04-28 PROCEDURE — 1159F MED LIST DOCD IN RCRD: CPT | Mod: S$GLB,,, | Performed by: FAMILY MEDICINE

## 2017-04-28 PROCEDURE — 3075F SYST BP GE 130 - 139MM HG: CPT | Mod: S$GLB,,, | Performed by: FAMILY MEDICINE

## 2017-04-28 PROCEDURE — 99999 PR PBB SHADOW E&M-EST. PATIENT-LVL III: CPT | Mod: PBBFAC,,, | Performed by: FAMILY MEDICINE

## 2017-04-28 RX ORDER — METFORMIN HYDROCHLORIDE 500 MG/1
500 TABLET ORAL 2 TIMES DAILY WITH MEALS
Qty: 180 TABLET | Refills: 3 | Status: SHIPPED | OUTPATIENT
Start: 2017-04-28 | End: 2018-06-25 | Stop reason: SDUPTHER

## 2017-04-28 RX ORDER — FESOTERODINE FUMARATE 4 MG/1
4 TABLET, EXTENDED RELEASE ORAL DAILY
Qty: 90 TABLET | Refills: 3 | Status: SHIPPED | OUTPATIENT
Start: 2017-04-28 | End: 2018-06-25 | Stop reason: SDUPTHER

## 2017-04-28 RX ORDER — ENALAPRIL MALEATE 20 MG/1
20 TABLET ORAL DAILY
Qty: 90 TABLET | Refills: 0 | Status: SHIPPED | OUTPATIENT
Start: 2017-04-28 | End: 2018-04-28

## 2017-04-28 RX ORDER — CETIRIZINE HYDROCHLORIDE 10 MG/1
10 TABLET ORAL DAILY
Qty: 10 TABLET | Refills: 0 | Status: SHIPPED | OUTPATIENT
Start: 2017-04-28 | End: 2018-06-25

## 2017-04-28 RX ORDER — CLOTRIMAZOLE AND BETAMETHASONE DIPROPIONATE 10; .64 MG/G; MG/G
CREAM TOPICAL 2 TIMES DAILY
Qty: 45 G | Refills: 0 | Status: SHIPPED | OUTPATIENT
Start: 2017-04-28 | End: 2018-06-25

## 2017-04-28 RX ORDER — OMEPRAZOLE 20 MG/1
20 CAPSULE, DELAYED RELEASE ORAL
Qty: 90 CAPSULE | Refills: 3 | Status: SHIPPED | OUTPATIENT
Start: 2017-04-28 | End: 2018-06-25 | Stop reason: SDUPTHER

## 2017-04-28 RX ORDER — PREDNISONE 20 MG/1
20 TABLET ORAL DAILY
Qty: 5 TABLET | Refills: 0 | Status: SHIPPED | OUTPATIENT
Start: 2017-04-28 | End: 2017-05-03

## 2017-04-28 RX ORDER — HYDROCHLOROTHIAZIDE 25 MG/1
25 TABLET ORAL DAILY
Qty: 90 TABLET | Refills: 0 | Status: SHIPPED | OUTPATIENT
Start: 2017-04-28 | End: 2018-06-25 | Stop reason: SDUPTHER

## 2017-04-28 RX ORDER — AMLODIPINE BESYLATE 5 MG/1
5 TABLET ORAL DAILY
Qty: 90 TABLET | Refills: 0 | Status: SHIPPED | OUTPATIENT
Start: 2017-04-28 | End: 2018-06-25 | Stop reason: SDUPTHER

## 2017-04-28 RX ORDER — LOVASTATIN 20 MG/1
20 TABLET ORAL NIGHTLY
Qty: 90 TABLET | Refills: 3 | Status: SHIPPED | OUTPATIENT
Start: 2017-04-28 | End: 2018-06-25 | Stop reason: SDUPTHER

## 2017-04-28 RX ORDER — SERTRALINE HYDROCHLORIDE 50 MG/1
50 TABLET, FILM COATED ORAL DAILY
Qty: 90 TABLET | Refills: 3 | Status: SHIPPED | OUTPATIENT
Start: 2017-04-28 | End: 2018-06-25

## 2017-04-28 NOTE — MR AVS SNAPSHOT
[facility]   [facility] [place] [address]  Phone: [phone]  Fax: [phone]                  Amanda Reyes   2017 10:20 AM   Office Visit    Descripción:  Female : 1950   Personal Médico:  Neftali Peters MD   Departamento:  [facility]           Razón de la mary     Annual Exam     Rash           Diagnósticos de Esta Visita        Comentarios    Allergic reaction, initial encounter    -  Primario     Type 2 diabetes mellitus with hyperglycemia, without long-term current use of insulin         Essential hypertension         Dyslipidemia         Cognitive deficits         Moderate episode of recurrent major depressive disorder         Angioedema, subsequent encounter         Gastroesophageal reflux disease, esophagitis presence not specified         Tinea cruris         Urinary incontinence, unspecified type                Lista de tareas           Citas próximas        Personal Médico Departamento Tfno del dpto    2017 1:30 PM LAB, [facility] [phone]      Metas (5 Years of Data)     Ninguna      Follow-Up and Disposition     Return in about 3 months (around 2017), or if symptoms worsen or fail to improve.      Recetas para recoger        Disp Refills Start End    sertraline (ZOLOFT) 50 MG tablet 90 tablet 3 2017    Take 1 tablet (50 mg total) by mouth once daily. - Oral    [facility]: [facility] [address] [place] [address] [address] [place] No. de tlfo: #: [phone]       clotrimazole-betamethasone 1-0.05% (LOTRISONE) cream 45 g 0 2017     Apply topically 2 (two) times daily. - Topical (Top)    [facility]: [facility] [address] [place] [address] [place] No. de tlfo: #: [phone]       predniSONE (DELTASONE) 20 MG tablet 5 tablet 0 2017 5/3/2017    Take 1 tablet (20 mg total) by mouth once daily. - Oral    [facility]: [facility] [address] [place] [address] [address]  [facility]. de tlfo: #: [phone]       cetirizine (ZYRTEC) 10 MG tablet 10 tablet 0 4/28/2017 5/8/2017    Take 1 tablet (10 mg total) by mouth once daily. - Oral    Farm[facility]: [address] de tlfo: #: [phone]       omeprazole (PRILOSEC) 20 MG capsule 90 capsule 3 4/28/2017 5/28/2017    Take 1 capsule (20 mg total) by mouth before breakfast. - Oral    Farm[facility]: [address] de tlfo: #: [phone]       hydrochlorothiazide (HYDRODIURIL) 25 MG tablet 90 tablet 0 4/28/2017     Take 1 tablet (25 mg total) by mouth once daily. - Oral    Farm[facility]: [address] de tlfo: #: [phone]       amlodipine (NORVASC) 5 MG tablet 90 tablet 0 4/28/2017 5/28/2017    Take 1 tablet (5 mg total) by mouth once daily. - Oral    [facility]arm[facility]: [address] de tlfo: #: [phone]       enalapril (VASOTEC) 20 MG tablet 90 tablet 0 4/28/2017 4/28/2018    Take 1 tablet (20 mg total) by mouth once daily. - Oral    [facility]arm[facility]: [address] de tlfo: #: [phone]       fesoterodine (TOVIAZ) 4 mg Tb24 90 tablet 3 4/28/2017 4/28/2018    Take 1 tablet (4 mg total) by mouth once daily. - Oral    Farm[facility]: [address] de tlfo: #: [phone]       lovastatin (MEVACOR) 20 MG tablet 90 tablet 3 4/28/2017     Take 1 tablet (20 mg total) by mouth every evening. - Oral    [facility]arm[facility]: [place] [address] [address] [place] No. de tlfo: #: [phone]       metformin (GLUCOPHAGE) 500 MG tablet 180 tablet 3 4/28/2017     Take 1 tablet (500 mg total) by mouth 2 (two) times daily with meals. - Oral    [facility]: [facility] [place] [address] No. de tlfo: #: [phone]         [facility]     [facility]  Línea de Enfermeras - Asistencia 24/7  Enfermeras registradas de [facility] pueden ayudarle a reservar agueda mary, proveer educación para la jake, asesoría clínica, y otros servicios de asesoramiento.   Llame para julia servicio gratuito a [phone].             Medicamentos           Mensaje sobre Medicamentos     Verificar los cambios y / o adiciones a yadav régimen de medicación son los mismos que discutir con yadav médico. Si cualquiera de estos cambios o adiciones son incorrectos, por favor notifique a yadav proveedor de atención médica.        EMPEZAR a wilfred estos medicamentos NUEVOS        Refills    sertraline (ZOLOFT) 50 MG tablet 3    Sig: Take 1 tablet (50 mg total) by mouth once daily.    Categoría: Normal    Vía: Oral    clotrimazole-betamethasone 1-0.05% (LOTRISONE) cream 0    Sig: Apply topically 2 (two) times daily.    Categoría: Normal    Vía: Topical (Top)    predniSONE (DELTASONE) 20 MG tablet 0    Sig: Take 1 tablet (20 mg total) by mouth once daily.    Categoría: Normal    Vía: Oral    cetirizine (ZYRTEC) 10 MG tablet 0    Sig: Take 1 tablet (10 mg total) by mouth once daily.    Categoría: Normal    Vía: Oral      DEJAR de wilfred estos medicamentos     peg 400-propylene glycol, PF, (SYSTANE ULTRA, PF,) 0.4-0.3 % Dpet Apply 1 drop to eye 4 (four) times daily as needed.    UNABLE TO FIND Take 20 mg by mouth once daily. Fluoextine    valacyclovir (VALTREX) 1000 MG tablet Take 1 tablet (1,000 mg total) by mouth 2 (two) times daily.    diazepam (VALIUM) 5 MG tablet Take 1 tablet (5 mg total) by mouth every 12 (twelve) hours as needed for Anxiety.    fluoxetine (PROZAC) 20 MG capsule Take 1 capsule (20 mg total) by mouth once daily.    senna-docusate 8.6-50 mg (PERICOLACE) 8.6-50 mg per tablet Take 1 tablet by mouth 2 (two) times daily as needed for Constipation.           Verifique que la siguiente lista de medicamentos es agueda representación exacta de los medicamentos que está tomando actualmente. Si no hay  "ningunos reportados, la lista puede estar en troy. Si no es correcta, por favor póngase en contacto con yadav proveedor de atención médica. Lleve esta lista con usted en dwaine de emergencia.           Medicamentos Actuales     amlodipine (NORVASC) 5 MG tablet Take 1 tablet (5 mg total) by mouth once daily.    hydrochlorothiazide (HYDRODIURIL) 25 MG tablet Take 1 tablet (25 mg total) by mouth once daily.    lovastatin (MEVACOR) 20 MG tablet Take 1 tablet (20 mg total) by mouth every evening.    cetirizine (ZYRTEC) 10 MG tablet Take 1 tablet (10 mg total) by mouth once daily.    clotrimazole-betamethasone 1-0.05% (LOTRISONE) cream Apply topically 2 (two) times daily.    enalapril (VASOTEC) 20 MG tablet Take 1 tablet (20 mg total) by mouth once daily.    fesoterodine (TOVIAZ) 4 mg Tb24 Take 1 tablet (4 mg total) by mouth once daily.    metformin (GLUCOPHAGE) 500 MG tablet Take 1 tablet (500 mg total) by mouth 2 (two) times daily with meals.    omeprazole (PRILOSEC) 20 MG capsule Take 1 capsule (20 mg total) by mouth before breakfast.    predniSONE (DELTASONE) 20 MG tablet Take 1 tablet (20 mg total) by mouth once daily.    sertraline (ZOLOFT) 50 MG tablet Take 1 tablet (50 mg total) by mouth once daily.           Información de referencia clínica           [place] signos vitales tara     PS [place] SpO2 BMI (IMC)    134/82 (BP Location: Right arm, Patient Position: Sitting, BP Method: Manual) 67 5' 7" (1.702 m) 78.1 kg (172 lb 2.9 oz) 97% 26.97 kg/m2      Blood Pressure          Most Recent Value    BP  134/82      Alergias     A partir del:  4/28/2017        No Known Allergies      Vacunas     Administradas en la fecha de la visita:  4/28/2017        None      Orders Placed During Today's Visit      Órdenes normales de esta visita    Urinalysis     Exámenes/Procedimientos futuros Se espera el Vence    CBC auto differential  4/28/2017 4/28/2018    Comprehensive metabolic panel  4/28/2017 7/27/2017    Hemoglobin A1c " " 4/28/2017 7/27/2017    Lipid panel  4/28/2017 7/27/2017    Microalbumin/creatinine urine ratio  4/28/2017 4/28/2018    TSH  4/28/2017 7/27/2017      Instrucciones      La depresión afecta yadav cuerpo y yadav mente  Todo el eleno se siente aylin o desanimado de vez en cuando por unos días o unas semanas. Depresión es cuando estos sentimientos no se van y empiezan a interferir con yadav hailey diaria. La depresión es agueda enfermedad real que produce agueda sensación de tristeza y desamparo, afecta yadav calidad de la hailey, niyah relaciones con los demás y yadav capacidad para pensar y actuar. Noam con ayuda, podrá sentirse mejor otra vez.  La depresión afecta a todo yadav cuerpo     "Cuando estaba deprimida, me sentía muy mal. Estaba gandhi cansada todo el tiempo que apenas si podía pensar, noam por la noche no me podía dormir. Me dolía la tesfaye. Me dolía el estómago. No sabía qué me pasaba."   Ciertas sustancias químicas del cerebro afectan no solo yadav estado de ánimo, sino también yadav cuerpo. Por eso, la depresión no solo le hace sentir desanimado, también es posible que se sienta mal físicamente. La depresión puede:  · Dificultar la función de ciertas facultades mentales prerna la memoria, la concentración o la capacidad de wilfred decisiones.  · Producir nerviosismo e inquietud.  · Causar dificultad para dormir o silvano producir demasiado sueño.  · Cambiar el apetito.  · Provocar sun de tesfaye, de estómago u otros malestares.  · Dejar al cuerpo sin energía.  La depresión y otras enfermedades  Es común que las personas con enfermedades crónicas también tengan depresión. A menudo es difícil saber si los trastornos físicos son la causa de la depresión o si la depresión es la causa de los trastornos físicos. Agueda persona puede deprimirse tras descubrir que tiene un problema de jake. Noam algunos estudios sugieren que la depresión puede aumentar la probabilidad de tener ciertos problemas de jake. Las personas deprimidas a veces no se cuidan silvano, " "lo cual aumenta las probabilidades de que se enfermen.  Date Last Reviewed: 1/19/2015  © 9121-3418 [employer]. [address] [place] [address]. Todos los derechos reservados. Esta información no pretende sustituir la atención médica profesional. Sólo yadav médico puede diagnosticar y tratar un problema de jake.        Asesoría psicológica para la depresión  Se ha demostrado que la asesoría psicológica o psicoterapia, también llamada "terapia de conversación", puede ser gandhi eficaz prerna los medicamentos en el tratamiento de la depresión de leve a moderada. Cuando julia tratamiento lo hace un profesional capacitado, puede ser agueda forma muy útil de ayudarle a entender niyah propios pensamientos y emociones. Al igual que ocurre con los medicamentos, es posible que tarde algún tiempo en notar los beneficios de la asesoría psicológica.  Tipos de asesoría psicológica    Cada psicoterapeuta sigue yadav propio método de asesoría psicológica, raulito el objetivo de todas las terapias es cambiar la perspectiva que usted tiene sobre yadav problema. La terapia para la depresión suele hacerse en sesiones individuales, aunque también se puede hacer en jeremiah. Puede consultar con yadav proveedor de atención médica acerca del tipo de terapia más apropiada para usted y sobre la persona adecuada para ofrecerla.  Cómo funciona la terapia  Hablar con otras personas acerca de niyah problemas puede hacer que estos parezcan menos agobiantes y ayudarle a resolver las dificultades que tenga en yadav hailey y en niyah relaciones. Además, también puede ayudarle a entender de qué manera la depresión está ofuscando niyah pensamientos e impidiéndole neo el eleno sky prerna es en realidad. La terapia puede darle:  · Agueda mejor comprensión de niyah propias emociones.  · Nuevas herramientas para enfrentar niyah problemas.  · Apoyo emocional para poder progresar.  Se necesita tiempo para mejorar  La asesoría psicológica le ayudará a sentirse mejor, raulito esta " mejoría no ocurrirá de forma inmediata. La depresión chari energía y motivación, por lo que puede ser difícil sentirse con ganas de ir a las sesiones de asesoría. Noam recuerde que julia tipo de terapia es muy útil en el tratamiento de la depresión. Fitzpatrick es un hecho demostrado. A menudo se establece un número determinado de sesiones para la terapia. En otros casos, usted y yadav terapeuta deciden cuándo ha llegado el momento en que ya no necesita más terapia.  Otras holland de apoyo  Además de la asesoría psicológica profesional, es posible que le resulte útil hablar con otras personas cercanas a usted. Federico vez encuentre apoyo y comprensión en:  · Un buen amigo o un miembro de yadav olga.  · Un sacerdote,  o rabino con capacitación en asesoría psicológica.  · Un jeremiah de apoyo local o un jeremiah comunitario.  · Un programa de 12 pasos (prerna el de Alcohólicos Anónimos) para tratar problemas que puedan contribuir a la depresión, prerna el alcoholismo o la drogadicción.  Date Last Reviewed: 1/19/2015  © 6592-1512 [employer]. [address] [place] [address]. Todos los derechos reservados. Esta información no pretende sustituir la atención médica profesional. Sólo yadav médico puede diagnosticar y tratar un problema de jake.        La depresión: Consejos para ayudarse a sí mismo  Mientras niyah proveedores de atención médica le madeleine tratamiento para la depresión, usted también puede ayudarse a sí mismo. Recuerde que tiene agueda enfermedad que le afecta en el aspecto físico, emocional, mental y social. La recuperación completa puede wilfred tiempo. Cuide yadav cuerpo y yadav mente, y tenga paciencia consigo mismo mientras se recupera.    Esté con otras personas  No se aísle de los demás. Si lo hace, se sentirá peor. Trate de pasar tiempo en compañía de otras personas y participe en actividades divertidas cuando pueda. Vaya a neo agueda película, un partido, un servicio religioso o un evento social. Hable francamente  con personas de confianza y acepte yadav ayuda cuando se la ofrezcan.  Mantenga agueda perspectiva ander  · La depresión puede ofuscar yadav juicio, por lo que es importante que espere hasta que se sienta mejor antes de wilfred decisiones importantes, prerna un cambio de trabajo, mudanza, matrimonio o divorcio.  · Recuerde que esta enfermedad no es culpa suya. No se gulpe por yadav depresión.  · Recuperarse de agueda depresión es un proceso que jhon yadav tiempo. No se desanime si no se siente mejor de inmediato.  · La depresión chari energía y dificulta la capacidad de concentración. Por lo tanto, no podrá hacer todas las cosas que hacía antes. Establezca objetivos razonables y anupam lo que pueda para alcanzarlos.  Cuide yadav cuerpo  A menudo, las personas que tienen depresión susanne de cuidarse, lo cual empeora aun más niyah problemas. Franci el tratamiento, y después, es importante que recuerde lo siguiente:  · Anupam ejercicio. El ejercicio es agueda manera excelente de cuidar yadav cuerpo y los estudios médicos indican que también ayuda a combatir la depresión.  · Evite las drogas y el alcohol. Es posible que le proporcionen alivio a corto plazo raulito, a abdifatah plazo, simplemente agravarán niyah problemas.  · Trate de reducir el estrés. Pida a yadav proveedor de atención médica que le recomiende ejercicios y técnicas de relajación para aliviar el estrés.  · Coma agueda dieta adela y silvano equilibrada para ayudar a mantener la jake de yadav cuerpo.  Date Last Reviewed: 1/19/2015  © 7458-1683 [employer]. [address] [place] [address]. Todos los derechos reservados. Esta información no pretende sustituir la atención médica profesional. Sólo yadav médico puede diagnosticar y tratar un problema de jake.             Language Assistance Services     ATTENTION: Language assistance services are available, free of charge. Please call [phone].      ATENCIÓN: Si habla español, tiene a yadav disposición servicios gratuitos de asistencia  lingüística. Butch al [phone].     [facility] Ý: N?u b?n nói Ti?ng Vi?t, có các d?ch v? h? tr? ngôn ng? mi?n phí dành cho b?n. G?i s? [phone].         [facility] cumple con las leyes federales aplicables de derechos civiles y no discrimina por motivos de luz, color, origen nacional, edad, discapacidad, o sexo.                 Amanda Reyes   2017 10:20 AM   Office Visit    Description:  Female : 1950   Provider:  Neftali Peters MD   Department:  [facility]           Reason for Visit     Annual Exam     Rash           Diagnoses this Visit        Comments    Allergic reaction, initial encounter    -  Primary     Type 2 diabetes mellitus with hyperglycemia, without long-term current use of insulin         Essential hypertension         Dyslipidemia         Cognitive deficits         Moderate episode of recurrent major depressive disorder         Angioedema, subsequent encounter         Gastroesophageal reflux disease, esophagitis presence not specified         Tinea cruris         Urinary incontinence, unspecified type                To Do List           Future Appointments        Provider Department Dept Phone    2017 1:30 PM LAB, [facility] [phone]      Goals     None      Follow-Up and Disposition     Return in about 3 months (around 2017), or if symptoms worsen or fail to improve.       These Medications        Disp Refills Start End    sertraline (ZOLOFT) 50 MG tablet 90 tablet 3 2017    Take 1 tablet (50 mg total) by mouth once daily. - Oral    Pharmacy: [facility] [place] [address] Ph #: [phone]       clotrimazole-betamethasone 1-0.05% (LOTRISONE) cream 45 g 0 2017     Apply topically 2 (two) times daily. - Topical (Top)    Pharmacy: [facility] [place] [address] Ph #: [phone]       predniSONE (DELTASONE) 20  MG tablet 5 tablet 0 4/28/2017 5/3/2017    Take 1 tablet (20 mg total) by mouth once daily. - Oral    Pharmacy: [address] #: [phone]       cetirizine (ZYRTEC) 10 MG tablet 10 tablet 0 4/28/2017 5/8/2017    Take 1 tablet (10 mg total) by mouth once daily. - Oral    Pharmacy: [address] #: [phone]       omeprazole (PRILOSEC) 20 MG capsule 90 capsule 3 4/28/2017 5/28/2017    Take 1 capsule (20 mg total) by mouth before breakfast. - Oral    Pharmacy: [address] #: [phone]       hydrochlorothiazide (HYDRODIURIL) 25 MG tablet 90 tablet 0 4/28/2017     Take 1 tablet (25 mg total) by mouth once daily. - Oral    Pharmacy: [address] #: [phone]       amlodipine (NORVASC) 5 MG tablet 90 tablet 0 4/28/2017 5/28/2017    Take 1 tablet (5 mg total) by mouth once daily. - Oral    Pharmacy: [address] #: [phone]       enalapril (VASOTEC) 20 MG tablet 90 tablet 0 4/28/2017 4/28/2018    Take 1 tablet (20 mg total) by mouth once daily. - Oral    Pharmacy: [address] #: [phone]       fesoterodine (TOVIAZ) 4 mg Tb24 90 tablet 3 4/28/2017 4/28/2018    Take 1 tablet (4 mg total) by mouth once daily. - Oral    Pharmacy: [address] #: [phone]       lovastatin (MEVACOR) 20 MG tablet 90 tablet 3 4/28/2017     Take 1 tablet (20 mg total) by mouth every evening. - Oral    Pharmacy: [address], [place] [address] #: [phone]       metformin (GLUCOPHAGE) 500 MG tablet 180 tablet 3 4/28/2017     Take 1 tablet (500 mg total) by mouth 2 (two) times daily with meals. - Oral    Pharmacy: [address] [place] [address]  #: [phone]         [facility]     [facility] Care Line - 24/7 Assistance  Unless otherwise directed by your provider, please contact [facility], our nurse care line that is available for 24/7 assistance.     Registered nurses in the [facility] provide: appointment scheduling, clinical advisement, health education, and other advisory services.  Call: [phone] (toll free)               Medications           Message regarding Medications     Verify the changes and/or additions to your medication regime listed below are the same as discussed with your clinician today.  If any of these changes or additions are incorrect, please notify your healthcare provider.        START taking these NEW medications        Refills    sertraline (ZOLOFT) 50 MG tablet 3    Sig: Take 1 tablet (50 mg total) by mouth once daily.    Class: Normal    Route: Oral    clotrimazole-betamethasone 1-0.05% (LOTRISONE) cream 0    Sig: Apply topically 2 (two) times daily.    Class: Normal    Route: Topical (Top)    predniSONE (DELTASONE) 20 MG tablet 0    Sig: Take 1 tablet (20 mg total) by mouth once daily.    Class: Normal    Route: Oral    cetirizine (ZYRTEC) 10 MG tablet 0    Sig: Take 1 tablet (10 mg total) by mouth once daily.    Class: Normal    Route: Oral      STOP taking these medications     peg 400-propylene glycol, PF, (SYSTANE ULTRA, PF,) 0.4-0.3 % Dpet Apply 1 drop to eye 4 (four) times daily as needed.    UNABLE TO FIND Take 20 mg by mouth once daily. Fluoextine    valacyclovir (VALTREX) 1000 MG tablet Take 1 tablet (1,000 mg total) by mouth 2 (two) times daily.    diazepam (VALIUM) 5 MG tablet Take 1 tablet (5 mg total) by mouth every 12 (twelve) hours as needed for Anxiety.    fluoxetine (PROZAC) 20 MG capsule Take 1 capsule (20 mg total) by mouth once daily.    senna-docusate 8.6-50 mg (PERICOLACE) 8.6-50 mg per tablet Take 1 tablet by mouth 2 (two) times daily as needed for Constipation.  "          Verify that the below list of medications is an accurate representation of the medications you are currently taking.  If none reported, the list may be blank. If incorrect, please contact your healthcare provider. Carry this list with you in case of emergency.           Current Medications     amlodipine (NORVASC) 5 MG tablet Take 1 tablet (5 mg total) by mouth once daily.    hydrochlorothiazide (HYDRODIURIL) 25 MG tablet Take 1 tablet (25 mg total) by mouth once daily.    lovastatin (MEVACOR) 20 MG tablet Take 1 tablet (20 mg total) by mouth every evening.    cetirizine (ZYRTEC) 10 MG tablet Take 1 tablet (10 mg total) by mouth once daily.    clotrimazole-betamethasone 1-0.05% (LOTRISONE) cream Apply topically 2 (two) times daily.    enalapril (VASOTEC) 20 MG tablet Take 1 tablet (20 mg total) by mouth once daily.    fesoterodine (TOVIAZ) 4 mg Tb24 Take 1 tablet (4 mg total) by mouth once daily.    metformin (GLUCOPHAGE) 500 MG tablet Take 1 tablet (500 mg total) by mouth 2 (two) times daily with meals.    omeprazole (PRILOSEC) 20 MG capsule Take 1 capsule (20 mg total) by mouth before breakfast.    predniSONE (DELTASONE) 20 MG tablet Take 1 tablet (20 mg total) by mouth once daily.    sertraline (ZOLOFT) 50 MG tablet Take 1 tablet (50 mg total) by mouth once daily.           Clinical Reference Information           Your Vitals Were     BP Pulse Height Weight SpO2 BMI    134/82 (BP Location: Right arm, Patient Position: Sitting, BP Method: Manual) 67 5' 7" (1.702 m) 78.1 kg (172 lb 2.9 oz) 97% 26.97 kg/m2      Blood Pressure          Most Recent Value    BP  134/82      Allergies as of 4/28/2017     No Known Allergies      Immunizations Administered on Date of Encounter - 4/28/2017     None      Orders Placed During Today's Visit      Normal Orders This Visit    Urinalysis     Future Labs/Procedures Expected by Expires    CBC auto differential  4/28/2017 4/28/2018    Comprehensive metabolic panel  " "4/28/2017 7/27/2017    Hemoglobin A1c  4/28/2017 7/27/2017    Lipid panel  4/28/2017 7/27/2017    Microalbumin/creatinine urine ratio  4/28/2017 4/28/2018    TSH  4/28/2017 7/27/2017      Instructions      La depresión afecta yadav cuerpo y yadav mente  Todo el eleno se siente aylin o desanimado de vez en cuando por unos días o unas semanas. Depresión es cuando estos sentimientos no se van y empiezan a interferir con yadav hailey diaria. La depresión es agueda enfermedad real que produce agueda sensación de tristeza y desamparo, afecta yadav calidad de la hailey, niyah relaciones con los demás y yadav capacidad para pensar y actuar. Noam con ayuda, podrá sentirse mejor otra vez.  La depresión afecta a todo yadav cuerpo     "Cuando estaba deprimida, me sentía muy mal. Estaba gandhi cansada todo el tiempo que apenas si podía pensar, noam por la noche no me podía dormir. Me dolía la tesfaye. Me dolía el estómago. No sabía qué me pasaba."   Ciertas sustancias químicas del cerebro afectan no solo yadav estado de ánimo, sino también yadav cuerpo. Por eso, la depresión no solo le hace sentir desanimado, también es posible que se sienta mal físicamente. La depresión puede:  · Dificultar la función de ciertas facultades mentales prerna la memoria, la concentración o la capacidad de wilfred decisiones.  · Producir nerviosismo e inquietud.  · Causar dificultad para dormir o silvano producir demasiado sueño.  · Cambiar el apetito.  · Provocar sun de tesfaye, de estómago u otros malestares.  · Dejar al cuerpo sin energía.  La depresión y otras enfermedades  Es común que las personas con enfermedades crónicas también tengan depresión. A menudo es difícil saber si los trastornos físicos son la causa de la depresión o si la depresión es la causa de los trastornos físicos. Agueda persona puede deprimirse tras descubrir que tiene un problema de jake. Noam algunos estudios sugieren que la depresión puede aumentar la probabilidad de tener ciertos problemas de jake. Las personas " "deprimidas a veces no se cuidan silvano, lo cual aumenta las probabilidades de que se enfermen.  Date Last Reviewed: 1/19/2015  © 7422-3186 [employer]. [address] [place] [id number]. Todos los derechos reservados. Esta información no pretende sustituir la atención médica profesional. Sólo yadav médico puede diagnosticar y tratar un problema de jake.        Asesoría psicológica para la depresión  Se ha demostrado que la asesoría psicológica o psicoterapia, también llamada "terapia de conversación", puede ser gandhi eficaz prerna los medicamentos en el tratamiento de la depresión de leve a moderada. Cuando julia tratamiento lo hace un profesional capacitado, puede ser agueda forma muy útil de ayudarle a entender niyah propios pensamientos y emociones. Al igual que ocurre con los medicamentos, es posible que tarde algún tiempo en notar los beneficios de la asesoría psicológica.  Tipos de asesoría psicológica    Cada psicoterapeuta sigue yadav propio método de asesoría psicológica, raulito el objetivo de todas las terapias es cambiar la perspectiva que usted tiene sobre yadav problema. La terapia para la depresión suele hacerse en sesiones individuales, aunque también se puede hacer en jeremiah. Puede consultar con yadav proveedor de atención médica acerca del tipo de terapia más apropiada para usted y sobre la persona adecuada para ofrecerla.  Cómo funciona la terapia  Hablar con otras personas acerca de niyah problemas puede hacer que estos parezcan menos agobiantes y ayudarle a resolver las dificultades que tenga en yadav hailey y en niyah relaciones. Además, también puede ayudarle a entender de qué manera la depresión está ofuscando niyah pensamientos e impidiéndole neo el eleno sky prerna es en realidad. La terapia puede darle:  · Agueda mejor comprensión de niyah propias emociones.  · Nuevas herramientas para enfrentar niyah problemas.  · Apoyo emocional para poder progresar.  Se necesita tiempo para mejorar  La asesoría psicológica le " ayudará a sentirse mejor, noam esta mejoría no ocurrirá de forma inmediata. La depresión chari energía y motivación, por lo que puede ser difícil sentirse con ganas de ir a las sesiones de asesoría. Noam recuerde que julia tipo de terapia es muy útil en el tratamiento de la depresión. [place] es un hecho demostrado. A menudo se establece un número determinado de sesiones para la terapia. En otros casos, usted y yadav terapeuta deciden cuándo ha llegado el momento en que ya no necesita más terapia.  Otras holland de apoyo  Además de la asesoría psicológica profesional, es posible que le resulte útil hablar con otras personas cercanas a usted. Federico vez encuentre apoyo y comprensión en:  · Un buen amigo o un miembro de yadav olga.  · Un sacerdote,  o rabino con capacitación en asesoría psicológica.  · Un jeremiah de apoyo local o un jeremiah comunitario.  · Un programa de 12 pasos (prerna el de Alcohólicos Anónimos) para tratar problemas que puedan contribuir a la depresión, prerna el alcoholismo o la drogadicción.  Date Last Reviewed: 1/19/2015  © 8582-4030 [employer]. [address] [place] [address]. Todos los derechos reservados. Esta información no pretende sustituir la atención médica profesional. Sólo yadav médico puede diagnosticar y tratar un problema de jake.        La depresión: Consejos para ayudarse a sí mismo  Mientras niyah proveedores de atención médica le madeleine tratamiento para la depresión, usted también puede ayudarse a sí mismo. Recuerde que tiene agueda enfermedad que le afecta en el aspecto físico, emocional, mental y social. La recuperación completa puede wilfred tiempo. Cuide yadav cuerpo y yadav mente, y tenga paciencia consigo mismo mientras se recupera.    Esté con otras personas  No se aísle de los demás. Si lo hace, se sentirá peor. Trate de pasar tiempo en compañía de otras personas y participe en actividades divertidas cuando pueda. Vaya a neo agueda película, un partido, un servicio religioso o  un evento social. Hable francamente con personas de confianza y acepte yadav ayuda cuando se la ofrezcan.  Mantenga agueda perspectiva ander  · La depresión puede ofuscar yadav juicio, por lo que es importante que espere hasta que se sienta mejor antes de wilfred decisiones importantes, prerna un cambio de trabajo, mudanza, matrimonio o divorcio.  · Recuerde que esta enfermedad no es culpa suya. No se gulpe por yadav depresión.  · Recuperarse de agueda depresión es un proceso que jhon yadav tiempo. No se desanime si no se siente mejor de inmediato.  · La depresión chari energía y dificulta la capacidad de concentración. Por lo tanto, no podrá hacer todas las cosas que hacía antes. Establezca objetivos razonables y anupam lo que pueda para alcanzarlos.  Cuide yadav cuerpo  A menudo, las personas que tienen depresión susanne de cuidarse, lo cual empeora aun más niyah problemas. Franci el tratamiento, y después, es importante que recuerde lo siguiente:  · Anupam ejercicio. El ejercicio es agueda manera excelente de cuidar yadav cuerpo y los estudios médicos indican que también ayuda a combatir la depresión.  · Evite las drogas y el alcohol. Es posible que le proporcionen alivio a corto plazo raulito, a abdifatah plazo, simplemente agravarán niyah problemas.  · Trate de reducir el estrés. Pida a yadav proveedor de atención médica que le recomiende ejercicios y técnicas de relajación para aliviar el estrés.  · Coma agueda dieta adela y silvano equilibrada para ayudar a mantener la jake de yadav cuerpo.  Date Last Reviewed: 1/19/2015  © 4686-2960 [employer]. [address] [place] [address], [place] [address]. Todos los derechos reservados. Esta información no pretende sustituir la atención médica profesional. Sólo yadav médico puede diagnosticar y tratar un problema de jake.             Language Assistance Services     ATTENTION: Language assistance services are available, free of charge. Please call [phone].      ATENCIÓN: Si habla español, tiene a yadav disposición  servicios gratuitos de asistencia lingüística. Butch ronquillo [phone].     MULU Ý: N?u b?n nói Ti?ng Vi?t, có các d?ch v? h? tr? ngôn ng? mi?n phí dành cho b?n. G?i s? [phone].         [facility] complies with applicable Federal civil rights laws and does not discriminate on the basis of race, color, national origin, age, disability, or sex.

## 2017-04-28 NOTE — PROGRESS NOTES
Subjective:       Patient ID: Amanda Reyes is a 67 y.o. female.    Chief Complaint: Annual Exam and Rash    HPI Comments: 67 years old female who came to the clinic for her physical examination.  Patient currently is not using her diabetes or depression medicines.  Patient was coming back from a trip from [facility].  Patient wants to reassume her medicines.  Patient with significant depression associated with problems with her son who is drug addict.Patient with problems with concentration associated with short-term memory loss that she relates this  depression.  Patient was crying during the interview.  Patient also with left ear swelling associated with ALLERGIES.  Patient with both buttocks rash associated with itching for the last couple of weeks.  Patient requests her medicine including her medicine for urinary incontinence.  Blood pressure today is stable.  No chest pain palpitations orthopnea or PND.  Patient using Prilosec with no recent flareups of reflux.    Rash       Review of Systems   Constitutional: Negative.    HENT: Negative.    Eyes: Negative.    Respiratory: Negative.    Cardiovascular: Negative.  Negative for chest pain, palpitations and leg swelling.   Gastrointestinal: Negative.    Endocrine: Negative for cold intolerance, heat intolerance, polydipsia, polyphagia and polyuria.   Genitourinary: Negative.  Negative for dysuria, frequency, hematuria and urgency.   Musculoskeletal: Negative.    Skin: Positive for rash.   Neurological: Negative.    Psychiatric/Behavioral: Positive for decreased concentration. The patient is nervous/anxious.        Objective:      Physical Exam   Constitutional: She is oriented to person, place, and time. She appears well-developed and well-nourished. No distress.   HENT:   Head: Normocephalic and atraumatic.   Right Ear: External ear normal.   Left Ear: External ear normal.   Ears:    Nose: Nose normal.   Mouth/Throat: Oropharynx is clear and moist. No  oropharyngeal exudate.   Eyes: Conjunctivae and EOM are normal. Pupils are equal, round, and reactive to light. Right eye exhibits no discharge. Left eye exhibits no discharge. No scleral icterus.   Neck: Normal range of motion. Neck supple. No JVD present. No tracheal deviation present. No thyromegaly present.   Cardiovascular: Normal rate, regular rhythm, normal heart sounds and intact distal pulses.  Exam reveals no gallop and no friction rub.    No murmur heard.  Pulmonary/Chest: Effort normal and breath sounds normal. No stridor. No respiratory distress. She has no wheezes. She has no rales. She exhibits no tenderness.   Abdominal: Soft. Bowel sounds are normal. She exhibits no distension and no mass. There is no tenderness. There is no rebound and no guarding.   Musculoskeletal: Normal range of motion. She exhibits no edema or tenderness.   Lymphadenopathy:     She has no cervical adenopathy.   Neurological: She is alert and oriented to person, place, and time. She has normal reflexes. No cranial nerve deficit. She exhibits normal muscle tone. Coordination normal.   Skin: Skin is warm and dry. Rash noted. Rash is maculopapular. She is not diaphoretic. No erythema. No pallor.        Psychiatric: Her behavior is normal. Judgment and thought content normal. Her mood appears anxious. Her affect is not angry, not blunt, not labile and not inappropriate. She exhibits a depressed mood.       Assessment:       1. Allergic reaction, initial encounter    2. Type 2 diabetes mellitus with hyperglycemia, without long-term current use of insulin    3. Essential hypertension    4. Dyslipidemia    5. Cognitive deficits    6. Moderate episode of recurrent major depressive disorder    7. Angioedema, subsequent encounter    8. Gastroesophageal reflux disease, esophagitis presence not specified    9. Tinea cruris    10. Urinary incontinence, unspecified type        Plan:         Amanda was seen today for annual exam and  rash.    Diagnoses and all orders for this visit:    Annual physical exam    Allergic reaction, initial encounter    Type 2 diabetes mellitus with hyperglycemia, without long-term current use of insulin  -     Comprehensive metabolic panel; Future  -     Hemoglobin A1c; Future  -     Microalbumin/creatinine urine ratio; Future    Essential hypertension  -     Comprehensive metabolic panel; Future  -     Lipid panel; Future  -     TSH; Future  -     CBC auto differential; Future  -     Urinalysis  -     hydrochlorothiazide (HYDRODIURIL) 25 MG tablet; Take 1 tablet (25 mg total) by mouth once daily.  -     amlodipine (NORVASC) 5 MG tablet; Take 1 tablet (5 mg total) by mouth once daily.  -     enalapril (VASOTEC) 20 MG tablet; Take 1 tablet (20 mg total) by mouth once daily.    Dyslipidemia  -     Comprehensive metabolic panel; Future  -     Lipid panel; Future  -     TSH; Future  -     lovastatin (MEVACOR) 20 MG tablet; Take 1 tablet (20 mg total) by mouth every evening.    Cognitive deficits  -     TSH; Future  -     Urinalysis    Moderate episode of recurrent major depressive disorder  -     sertraline (ZOLOFT) 50 MG tablet; Take 1 tablet (50 mg total) by mouth once daily.    Angioedema, subsequent encounter  -     predniSONE (DELTASONE) 20 MG tablet; Take 1 tablet (20 mg total) by mouth once daily.  -     cetirizine (ZYRTEC) 10 MG tablet; Take 1 tablet (10 mg total) by mouth once daily.    Gastroesophageal reflux disease, esophagitis presence not specified  -     omeprazole (PRILOSEC) 20 MG capsule; Take 1 capsule (20 mg total) by mouth before breakfast.    Tinea cruris  -     clotrimazole-betamethasone 1-0.05% (LOTRISONE) cream; Apply topically 2 (two) times daily.    Urinary incontinence, unspecified type  -     fesoterodine (TOVIAZ) 4 mg Tb24; Take 1 tablet (4 mg total) by mouth once daily.    Other orders  -     metformin (GLUCOPHAGE) 500 MG tablet; Take 1 tablet (500 mg total) by mouth 2 (two) times daily  with meals.

## 2017-04-28 NOTE — PATIENT INSTRUCTIONS
"  La depresión afecta yadav cuerpo y yadav mente  Todo el eleno se siente aylin o desanimado de vez en cuando por unos días o unas semanas. Depresión es cuando estos sentimientos no se van y empiezan a interferir con yadav hailey diaria. La depresión es agueda enfermedad real que produce agueda sensación de tristeza y desamparo, afecta yadav calidad de la hailey, niyah relaciones con los demás y yadav capacidad para pensar y actuar. Noam con ayuda, podrá sentirse mejor otra vez.  La depresión afecta a todo yadav cuerpo     "Cuando estaba deprimida, me sentía muy mal. Estaba gandhi cansada todo el tiempo que apenas si podía pensar, noam por la noche no me podía dormir. Me dolía la tesfaye. Me dolía el estómago. No sabía qué me pasaba."   Ciertas sustancias químicas del cerebro afectan no solo yadav estado de ánimo, sino también yadav cuerpo. Por eso, la depresión no solo le hace sentir desanimado, también es posible que se sienta mal físicamente. La depresión puede:  · Dificultar la función de ciertas facultades mentales prerna la memoria, la concentración o la capacidad de wilfred decisiones.  · Producir nerviosismo e inquietud.  · Causar dificultad para dormir o silvano producir demasiado sueño.  · Cambiar el apetito.  · Provocar sun de tesfaye, de estómago u otros malestares.  · Dejar al cuerpo sin energía.  La depresión y otras enfermedades  Es común que las personas con enfermedades crónicas también tengan depresión. A menudo es difícil saber si los trastornos físicos son la causa de la depresión o si la depresión es la causa de los trastornos físicos. Agueda persona puede deprimirse tras descubrir que tiene un problema de jake. Noam algunos estudios sugieren que la depresión puede aumentar la probabilidad de tener ciertos problemas de jake. Las personas deprimidas a veces no se cuidan silvano, lo cual aumenta las probabilidades de que se enfermen.  Date Last Reviewed: 1/19/2015  © 9181-1750 [employer]. [address] [place] [address], [place], [place] " "[id number]. Todos los derechos reservados. Esta información no pretende sustituir la atención médica profesional. Sólo yadav médico puede diagnosticar y tratar un problema de jake.        Asesoría psicológica para la depresión  Se ha demostrado que la asesoría psicológica o psicoterapia, también llamada "terapia de conversación", puede ser gandhi eficaz prerna los medicamentos en el tratamiento de la depresión de leve a moderada. Cuando julia tratamiento lo hace un profesional capacitado, puede ser agueda forma muy útil de ayudarle a entender niyah propios pensamientos y emociones. Al igual que ocurre con los medicamentos, es posible que tarde algún tiempo en notar los beneficios de la asesoría psicológica.  Tipos de asesoría psicológica    Cada psicoterapeuta sigue yadav propio método de asesoría psicológica, raulito el objetivo de todas las terapias es cambiar la perspectiva que usted tiene sobre yadav problema. La terapia para la depresión suele hacerse en sesiones individuales, aunque también se puede hacer en jeremiah. Puede consultar con yadav proveedor de atención médica acerca del tipo de terapia más apropiada para usted y sobre la persona adecuada para ofrecerla.  Cómo funciona la terapia  Hablar con otras personas acerca de niyah problemas puede hacer que estos parezcan menos agobiantes y ayudarle a resolver las dificultades que tenga en yadav hailey y en niyah relaciones. Además, también puede ayudarle a entender de qué manera la depresión está ofuscando niyah pensamientos e impidiéndole neo el eleno sky prerna es en realidad. La terapia puede darle:  · Agueda mejor comprensión de niyah propias emociones.  · Nuevas herramientas para enfrentar niyah problemas.  · Apoyo emocional para poder progresar.  Se necesita tiempo para mejorar  La asesoría psicológica le ayudará a sentirse mejor, raulito esta mejoría no ocurrirá de forma inmediata. La depresión chari energía y motivación, por lo que puede ser difícil sentirse con ganas de ir a las sesiones de asesoría. " Noam recuerde que julia tipo de terapia es muy útil en el tratamiento de la depresión. [place] es un hecho demostrado. A menudo se establece un número determinado de sesiones para la terapia. En otros casos, usted y yadav terapeuta deciden cuándo ha llegado el momento en que ya no necesita más terapia.  Otras holland de apoyo  Además de la asesoría psicológica profesional, es posible que le resulte útil hablar con otras personas cercanas a usted. Federico vez encuentre apoyo y comprensión en:  · Un buen amigo o un miembro de yadav olga.  · Un sacerdote,  o rabino con capacitación en asesoría psicológica.  · Un jeremiah de apoyo local o un jeremiah comunitario.  · Un programa de 12 pasos (prerna el de Alcohólicos Anónimos) para tratar problemas que puedan contribuir a la depresión, prerna el alcoholismo o la drogadicción.  Date Last Reviewed: 1/19/2015  © 0848-9774 [employer]. [address] [place] [id number]. Todos los derechos reservados. Esta información no pretende sustituir la atención médica profesional. Sólo yadav médico puede diagnosticar y tratar un problema de jake.        La depresión: Consejos para ayudarse a sí mismo  Mientras niyah proveedores de atención médica le madeleine tratamiento para la depresión, usted también puede ayudarse a sí mismo. Recuerde que tiene agueda enfermedad que le afecta en el aspecto físico, emocional, mental y social. La recuperación completa puede wilfred tiempo. Cuide yadav cuerpo y yadav mente, y tenga paciencia consigo mismo mientras se recupera.    Esté con otras personas  No se aísle de los demás. Si lo hace, se sentirá peor. Trate de pasar tiempo en compañía de otras personas y participe en actividades divertidas cuando pueda. Vaya a neo agueda película, un partido, un servicio religioso o un evento social. Hable francamente con personas de confianza y acepte yadav ayuda cuando se la ofrezcan.  Mantenga agueda perspectiva ander  · La depresión puede ofuscar yadav juicio, por lo que es  importante que espere hasta que se sienta mejor antes de wilfred decisiones importantes, prerna un cambio de trabajo, mudanza, matrimonio o divorcio.  · Recuerde que esta enfermedad no es culpa suya. No se gulpe por yadav depresión.  · Recuperarse de agueda depresión es un proceso que jhon yadav tiempo. No se desanime si no se siente mejor de inmediato.  · La depresión chari energía y dificulta la capacidad de concentración. Por lo tanto, no podrá hacer todas las cosas que hacía antes. Establezca objetivos razonables y anupam lo que pueda para alcanzarlos.  Cuide yadav cuerpo  A menudo, las personas que tienen depresión susanne de cuidarse, lo cual empeora aun más niyah problemas. Franci el tratamiento, y después, es importante que recuerde lo siguiente:  · Anupam ejercicio. El ejercicio es agueda manera excelente de cuidar yadav cuerpo y los estudios médicos indican que también ayuda a combatir la depresión.  · Evite las drogas y el alcohol. Es posible que le proporcionen alivio a corto plazo raulito, a abdifatah plazo, simplemente agravarán niyah problemas.  · Trate de reducir el estrés. Pida a yadav proveedor de atención médica que le recomiende ejercicios y técnicas de relajación para aliviar el estrés.  · Coma agueda dieta adela y silvano equilibrada para ayudar a mantener la jake de yadav cuerpo.  Date Last Reviewed: 1/19/2015  © 3190-1980 [employer]. [address] [place] [id number]. Todos los derechos reservados. Esta información no pretende sustituir la atención médica profesional. Sólo yadav médico puede diagnosticar y tratar un problema de jake.

## 2017-04-29 LAB
ESTIMATED AVG GLUCOSE: 301 MG/DL
HBA1C MFR BLD HPLC: 12.1 %

## 2017-05-19 ENCOUNTER — PATIENT OUTREACH (OUTPATIENT)
Dept: ADMINISTRATIVE | Facility: HOSPITAL | Age: 67
End: 2017-05-19

## 2017-05-19 DIAGNOSIS — E11.9 DIABETES MELLITUS WITHOUT COMPLICATION: ICD-10-CM

## 2017-05-20 ENCOUNTER — OUTPATIENT CASE MANAGEMENT (OUTPATIENT)
Dept: ADMINISTRATIVE | Facility: OTHER | Age: 67
End: 2017-05-20

## 2017-05-20 NOTE — PROGRESS NOTES
For your Information:    Please note the following patient has been assigned to Jacquelyn Nunez RN  with [facility] for screening.    Reason: Disease Mgmt  Diabetes mellitus without complication [E11.9]    Please contact OPCM at ext [phone] with questions.    Thank you    Sofya Mcclain, SSC

## 2017-05-30 ENCOUNTER — OUTPATIENT CASE MANAGEMENT (OUTPATIENT)
Dept: ADMINISTRATIVE | Facility: OTHER | Age: 67
End: 2017-05-30

## 2017-05-30 ENCOUNTER — PATIENT MESSAGE (OUTPATIENT)
Dept: ADMINISTRATIVE | Facility: OTHER | Age: 67
End: 2017-05-30

## 2017-05-30 NOTE — PATIENT INSTRUCTIONS
Hipoglucemia (azúcar baja en la sonali)    Se llama hipoglucemia (o hipoglicemia) a los niveles bajos de glucosa (azúcar) en la sonali. Un nivel bajo de azúcar en la sonali por lo general significa que está por debajo de 70/mg/dL.Hable con yadav proveedor de atención médica sobre cuál es el nivel ideal para usted. La diabetes misma no causa niveles bajos de azúcar en la sonali, raulito algunos tratamientos para la diabetes, prerna las pastillas o la insulina, podrían aumentar yadav riesgo. Un nivel bajo de azúcar en la sonali puede hacerle perder el conocimiento o provocarle convulsiones. De manera que siempre trate de inmediatio un nivel bajo de azúcar en la sonali, raulito no coma en exceso.  Nota especial: lleve siempre consigo tabletas de glucosa y algo para comer por si tuviera agueda hipoglucemia.   Lo que usted puede sentir  Cuando yadav nivel de azúcar está demasiado bajo, puede tener scot o más de los siguientes síntomas:  · Temblores o mareo  · Frío, piel pegajosa o sudor  · Sensación de hambre  · Dolor de tesfaye  · Nerviosismo  · Latidos del corazón pat y rápidos  · Debilidad  · Confusión o irritabilidad  · Visión borrosa  · Tener pesadillas o despertarse confundido o sudando  · Entumecimiento y cosquilleo en los labios o la lengua.  Lo que usted debe hacer  Estos son algunos consejos sobre lo que debe hacer si tiene hipoglucemia:  · Isak que todo revise yadav nivel de azúcar en la sonali. Si está demasiado bajo (fuera de los límites adecuados para usted), coma o denice de 15 a 20 gramos de azúcar de acción rápida, tales prerna 3 a 4 tabletas de glucosa, 4 onzas (media taza) de jugo de fruta o gaseosa corriente (no de dieta), 8 onzas de leche descremada o 1 cucharada de miel. No tome más de esto, o yadav nivel de azúcar en la sonali podría elevarse.  · Espere 15 minutos. Luego, si puede, vuelva a medirse el nivel de azúcar en la sonali.  · Si yadav nivel de azúcar en la sonali todavía es muy bajo, repita los pasos anteriores. Si  aún no mejora, busque ayuda médica.  · Agueda vez que yadav nivel de azúcar en la sonali regrese a los niveles adecuados para usted, coma un refrigerio o agueda comida.  Cómo prevenir un nivel bajo de azúcar  · Coma niyah comidas y refrigerios a la misma hora todos los días ¡No se salte ninguna comida!  · Pregúntele a yadav proveedor de atención médica si es seguro para usted wilfred alcohol. [place] lo pooja con el estómago vacío.  · Siempre tome niyah medicamentos prerna se los recetaron.  · Siempre lleve con usted agueda felipe de azúcar de acción rápida y un bocado cuando se encuentre lejos de casa.  Otras cosas que puede hacer  Algunos consejos adicioanles incluyen:  · Lleve consigo agueda tarjeta de identificación médica, agueda memoria USB compacta o agueda pulsera o greer de alerta médica que diga que usted tiene diabetes. También debe decir lo que hay que hacer si usted pierde el conocimiento o tiene convlusiones.  · Asegúrese de que niyah familiares, amigos y compañeros de trabajo conozcan las señales de un nivel bajo de azúcar. Dígales lo que deben hacer si yadav nivel de azúcar  muy por debajo y no puede tratarse usted mismo.  · Mantenga a mano un kit de glucagón de emergencia. Asegúrese de que niyah familiares, amigos y compañeros de trabajo sepan cómo y cuándo usarlo. Revíselo regularmente y remplace el glucagón antes de yadav fecha de vencimiento.  · Hable con yadav equipo de atención médica acerca de otras cosas que puede hacer para prevenir un nivel bajo de azúcar.  Si la hipoglucemia se presenta varias veces, llame a yadav proveedor de atención médica.   Date Last Reviewed: 2016  © 3593-3288 [employer], [employer]. [address] [place] [address], [place] [address]. Todos los derechos reservados. Esta información no pretende sustituir la atención médica profesional. Sólo yadav médico puede diagnosticar y tratar un problema de jake.        Diabetescon [place] Nivel De Azúcar En La Sonali [Diabetes With High Blood Sugar]  Usted ha recibido tratamiento  por tener un alto nivel de azúcar en la sonali (hiperglucemia). Keri puede deberse a agueda infección u otra enfermedad, a no chris seguido la dieta (chris comido demasiados dulces o alimentos con almidón), a no haberse aplicado suficiente insulina, u a otros factores.  Cuidados En [place]:  1) Un alto nivel de azúcar en la sonali puede ocasionar síntomas que usted puede aprender a reconocer, tales prerna los que se enumeran más abajo.  2) Si oskar que yadav nivel de azúcar en la sonali quizás esté demasiado alto, mídalo mediante agueda prueba de orina o de sonali. Si se encuentra por encima de niyah valores habituales, para corregirlo, emplee la dosis de insulina regular de la escala móvil que le kathleen yadav médico. Si no recibió ninguna indicación respecto de agueda escala móvil, comuníquese con yadav médico para que le aconseje.  3) Si el nivel de azúcar que tiene en la sonali es superior a 300 y no puede comunicarse con yadav médico, llame o regrese a keri centro.  4) Controle y anote niyah niveles de azúcar en la sonali (y la dosis de insulina, si la utiliza) al menos dos veces al día (antes del desayuno y antes de la susana). Hágalo cassandra los próximos 3 a 5 días.  Visita De Control:  Visite a yadav médico cassandra la próxima semana para que revise niyah registros de azúcar en sonali. [place] si necesita ajustar la dosis de insulina que está utilizando u otro medicamento que esté tomando para el azúcar en la sonali.  Busque Prontamente Atención Médica  si algo de lo siguiente ocurre:  -- NIVEL ALTO DE AZÚCAR EN LA SONALI: ganas de orinar frecuentes, mareos, sed, dolor de tesfaye, náuseas o vómito, dolor abdominal, somnolencia o pérdida de conciencia.  -- NIVEL BAJO DE AZÚCAR EN SONALI: fatiga, dolor de tesfaye, temblores, sudoración, hambre, ansiedad, visión reducida, mareos, debilidad, confusión o pérdida de conciencia, convulsiones.  Date Last Reviewed: 3/23/2015  © 3946-8354 [employer]. [address] [place] [address], [place]  [address]. Todos los derechos reservados. Esta información no pretende sustituir la atención médica profesional. Sólo yadav médico puede diagnosticar y tratar un problema de jake.        Diabetes (Información general)  La diabetes es un problema de jake a abdifatah plazo que significa que yadav cuerpo no produce la suficiente insulina. O también puede significar que yadav cuerpo no puede utilizar la insulina que produce. La insulina es agueda hormona en yadav organismo, que permite que el azúcar en la sonali (glucosa) llegue a las células en yadav cuerpo. Todas niyah células necesitan glucosa prerna combustible.  Cuando usted tiene diabetes la glucosa en yadav cuerpo se acumula porque no puede llegar hasta las células. Esta acumulación se conoce prerna un nivel alto de azúcar en la sonali (hiperglucemia).  Yadav nivel de azúcar en la sonali depende de varias cosas. Depende de la clase de alimentos que usted come y de cuánto come. También depende de cuánto ejercicio hace y de cuánta insulina tiene en yadav organismo. [place] mucho de las clases indebidas de alimentos o no irene a tiempo los medicamentos para la diabetes puede causar con el tiempo un nivel alto de azúcar en la sonali. Las infecciones pueden provocar un nivel alto de azúcar en la sonali, incluso si está tomando niyah medicamentos correctamente.  Estas cosas también pueden causar un nivel bajo de azúcar en la sonali:  · Saltarse las comidas  · No comer suficientes alimentos  · Irene demasiado de un medicamento para la diabetes  Con el tiempo, si no se trata la diabetes puede causar problemas serios. Estos problemas incluyen enfermedades del corazón, ataque cerebral, insuficiencia renal y ceguera. También pueden incluir dolor en los nervios o pérdida de sensación en niyah piernas o pies. Al mantener yadav azúcar en la sonali bajo control usted puede prevenir o retrasar estos problemas.  Los niveles normales en la sonali son de 80 a 130 antes de las comidas y menos de 180 de 1 a 2 horas después de  comer.    Cuidados en el hogar  Cuando se esté cuidando usted mismo en yadav hogar, siga estas pautas:  · Siga la dieta que le recomiende yadav proveedor de atención médica.  Use la insulina o cualquier otro medicamento para la diabetes, exactamente prerna se lo indiquen.  · Vigile niyah niveles de azúcar en la sonali prerna le indiquen. Lleve un registro con los resultados. [place] le ayudará a yadav proveedor a cambiar niyah medicamentos para mantener el azúcar en la sonali bajo control.   · Trate de alcanzar yadav peso ideal. Es posible que pueda reducir o dejar de wilfred medicamentos para la diabetes si come los alimentos adecuados y hace ejercicio.  · No fume. Fumar hace que los efectos de la diabetes empeoren en yadav circulación. Si usted fuma y tiene diabetes es mucho más probable que sufra un ataque al corazón.   · Cuídese silvano niyah pies. Si mackay perdido la sensación en niyah pies, es posible que no megan agueda herida o agueda infección. Revise niyah pies y la elizabeth entre los dedos por lo menos agueda vez a la semana.    · Utilice yadav brazalete de alerta médica o lleve agueda tarjeta en yadav billetera que diga que usted tiene diabetes. [place] les ayudará a los proveedores de atención médica a brindarle los cuidados adecuados si usted se enferma hasta el punto que no pueda decirles que tiene diabetes.  Plan para un día de enfermedad  Si usted contrae un resfriado, la gripe o agueda infección viral, siga estos pasos:  · Revise yadav plan para un día de enfermedad de la diabetes y llame a yadav proveedor de atención médica prerna le indicaron que lo hiciera. Es posible que deba llamar al proveedor de inmediato si:  ¨ Yadav nivel de azúcar en la sonali está por encima de 240 a pesar de estar tomando los medicamentos para la diabetes  ¨ Los niveles de cetonas en yadav orina están por encima de lo normal o altos  ¨ Ha estado vomitando cassandra más de 6 horas  ¨ Tiene dificultades para respirar  ¨ Tiene agueda fiebre farhana  ¨ Tiene fiebre cassandra varios días y no mejora  ¨ Se siente más  aturdido o somnoliento de lo usual  · Siga tomando niyah pastillas para la diabetes (medicamentos orales) incluso si ha estado vomitando y se siente muy enfermo. Llame a yadav proveedor de inmediato porque es posible que necesite insulina para bajar los niveles de azúcar en la sonali hasta que se recupere de yadav enfermedad.    · Monitoree yadav insulina incluso si ha estado vomitando y se siente muy enfermo. Llame a yadav proveedor de inmediato y pregunte si necesita cambiar yadav dosis de insulina. [place] dependerá de los resultados de niyah niveles de azúcar en la sonail.   · Revise yadav nivel de azúcar en la sonali cada 2 a 4 horas, o por lo menos 4 veces al día.  · Revise con frecuencia niyah cetonas. Si está vomitando y tiene diarrea, revíselas con más frecuencia.  · No se salte comidas. Trate de comer comidas pequeñas a intervalos regulares. Hágalo aunque no sienta gana de comer.  · Denice agua y otros líquidos que no contengan cafeína o calorías. [place] prevendrá que se deshidrate. Si tiene náuseas o vómito, tome pequeños sorbos cada 5 minutos. Para prevenir la deshidratación trate de beber agueda taza (8 onzas) de líquido cada hora mientras esté despierto.  Cuidados generales  Siempre lleve con usted agueda felipe de azúcar de acción rápida para el dwaine de que tenga síntomas de un nivel bajo de azúcar en la sonali (menos de 70). A las primeras señales de un nivel bajo de azúcar en la sonali, coma o denice de 15 a 20 gramos de azúcar de acción rápida para elevar el nivel de azúcar en la sonali. Algunos ejemplos son:    · 3 a 4 tabletas de glucosa. Estas pueden comprarse en la mayoría de las farmacias.  · 4 onzas (1/2 taza) de un refresco (que no sea de dieta)  · 4 onzas (1/2 taza) de cualquier jugo de fruta  · 8 onzas (1 taza) de leche  · 5 a 6 unidades de caramelos duros  · 1 cucharada de miel  Revise yadav nivel de azúcar en la sonali 15 minutos después de tratarse. Si sigue por debajo de 70, tome de 15 a 20 gramos más de azúcar de acción  rápida. Vuelva a revisarse en 15 minutos. Si regresa a lo normal (70 o más), coma un bocadillo o agueda comida para mantener el azúcar en la sonali dentro de un nivel seguro. Si permanece bajo, llame a yadav médico o vaya a agueda jarad de emergencias.  Cuidado de seguimiento  Anupam agueda mary de seguimiento con yadav proveedor de atención médica, o prerna le indiquen. Para más información acerca de la diabetes, visite la [employer]ág[employer] Games web de la Asociación Americana de la Diabetes (American Diabetes Association) en www.diabetes.org o llame al [phone].  Cuándo buscar consejo médico  Llame a yadav proveedor de atención médica de inmediato si tiene cualquiera de estos síntomas de un nivel alto de azúcar en la sonali:    · Orina frecuentemente  · Mareos  · Somnolencia  · Sed  · Dolor de tesfaye  · Náuseas o vómito  · Dolor abdominal  · Cambios en la vista  · Respiración rápida  · Confusión o pérdida del conocimiento  También llame a yadav proveedor de inmediato si tiene alguna de estas señales de un nivel bajo de azúcar en la sonali:    · Fatiga  · Dolor de tesfaye  · Temblores  · Sudoración excesiva  · Hambre  · Se siente ansioso o inquieto  · Cambios en la vista  · Somnolencia  · Debilidad  · Confusión o pérdida del conocimiento  Llame a yadav proveedor de inmediato si cualquiera de estos ocurre:  · Dolor en el pecho o falta de aire  · Mareos o desmayos  · Debilidad en un brazo o agueda pierna, o en un lado de la patty  · Dificultad para hablar o para neo   Date Last Reviewed: 6/1/2016  © 0923-0105 [employer]. [address] [place] [address], [place] [address]. Todos los derechos reservados. Esta información no pretende sustituir la atención médica profesional. Sólo yadav médico puede diagnosticar y tratar un problema de jake.        Comidas sanas para la diabetes    Pídale a yadav equipo de atención médica que le ayude a preparar un plan de comidas adecuado a niyah necesidades. [place] plan establecerá el horario de niyah comidas, el tipo de alimentos que  "debe comer y la cantidad de cada scot de ellos. No es necesario que deje de comer todas las cosas que le gustan, raulito deberá seguir ciertas pautas.  Elija carbohidratos saludables  Los almidones, las azúcares y la fibra son todos tipos de carbohidratos. La fibra puede ayudarle a bajar yadav colesterol y triglicéridos. También es un alimento saludable para el corazón. Usted debe consumir de 20 a 35 gramos de fibra total todos los días. Entre los alimentos ricos en fibra, se encuentran los siguientes:     · Pan y cereales integrales  · Precious bulgur  · Arroz silva     · Pastas de precious integral  · Frutas y verduras  · Frijoles y chícharos secos      Lleve la cuenta de los carbohidratos que consume. [place] puede ayudarle a mantener el equilibrio adecuado entre la actividad física y los medicamentos. La cantidad de carbohidratos necesarios varará para cada persona, dependiendo de muchas cosas prerna yadav jake, los medicamentos que jhon y qué tan activo se mantiene. Puede empezar con unos 45 a 60 gramos de carbohidratos por comida, dependiendo de yadav situación.  Estos son algunos ejemplos de alimentos que contienen unos 15 gramos de carbohidratos (1 porción de carbohidratos):  · 1/2 taza de fruta enlatada o congelada  · Sade fruta pequeña (4 onzas)  · 1 rebanada de pan  · 1/2 taza de valery  · 1/3 de taza de arroz  · 4 a 6 galletas saladas  · 1/2 English muffin  · 1/2 taza de frijoles negros  · 1/4 de papa azada, debora (3 onzas)  · 2/3 de taza de yogur natural sin grasa  · 1 taza de sopa  · 1/2 taza de guizado  · 6 "nuggets" de orlando  · 1 bownie de 2 pulgadas cuadradas o un pastel sin glasear  · 2 galletas dulces pequeñas  · 1/2 taza de helado o de sorbete  Escoja alimentos proteínicos sanos  Las proteínas bajas en grasa pueden ayudarle a controlar el peso y a mantener emory el corazón. Entre los alimentos proteínicos bajos en grasa, se encuentran los siguientes:  · Pescado  · Proteínas vegetales prerna las de los frijoles y chícharos " "secos, las nueces y los productos derivados de la soya, prerna el tofu y la leche de soya  · Carne magra a la que se le ha quitado toda la grasa visible  · Carne de ave sin la piel  · Leche, queso o yogur bajos en grasa o sin grasa  Limite las grasas no saludables y el azúcar  Las grasas saturadas y las grasas trans no son buenas para el corazón porque aumentan el colesterol LDL (suhail). Además, la grasa tiene un alto contenido de calorías y le puede hacer ganar peso. Para reducir la cantidad de grasas malas y azúcar, limite el consumo de los siguientes alimentos:     · Mantequilla y margarina  · Aceite de guzman o de berny  · Crema o christian  · Queso  · Tocino  · Esau frías     · Helados  · Dulces, pasteles, madalenas y donas  · Mermeladas y gelatinas  · Chocolatinas  · Refrescos azucarados   Cuánto debe comer  La cantidad de comida que ingiere afecta el nivel de azúcar en yadav sonali y yadav peso. Yadav equipo de atención médica le dirá cuánto debe comer de cada clase de alimentos.  · Utilice tazas y cucharas de medir, y agueda balanza de cocina para medir las porciones.  · Aprenda a identificar visualmente el tamaño correcto de agueda porción en yadav plato. [place] le será útil cuando coma fuera de casa y no pueda medir las porciones.  · Coma solamente el número de porciones que le hayan dado en yadav plan de comidas para cada tipo de alimento. No se sirva por segunda vez.  · Aprenda a leer las etiquetas de los alimentos. Asegúrese de saber cuánto es agueda porción, y la cantidad total de carbohidratos, fibra, calorías, azúcar y grasa saturada y trans que tiene. Busque alternativas más saludables para los alimentos que tengan azúcar añadida.  · Planifique de antemano para las fiestas de manera que aún pueda divertirse sin sobrepasarse con alimentos poco saludables. Dé un buen ejemplo trayendo un platillo saudable a los "potlucks".  Elija bocadillos saludables  Cuando se habla de bocadillos se piensa en alimentos con azúcar añadida y grasas, " "raulito hay otras opciones de bocadillos que son más saludables. Estas son algunas ideas que puede elegir:  Bocadillos con menos de 5 gramos de carbohidratos  · Agueda unidad de queso en tiras (string cheese)  · Rell ramas de apio con 1 cucharada de mantequilla de cacahuate  · 5 tomates tipo "cherry" con 1 cucharada de aderezo tipo "ranch"  · 1 huevo huy  · 1/4 de taza de arándanos azules (blue berries)  · 5 zanahorias "baby"  · 1 taza de palomitas de maiz "light"  · 1/2 taza de gelatina sin azúcar  · 15 almendras  Bocadillos con entre 10 y 20 gramos de carbohidratos aproximadamente  · 1/3 de taza de "hummus" con trozos de vegetales sin almidon (zanahorias, pimientos verdes, brócoli, apio o agueda combinación)  · 1/2 taza de fruta fresca o enlatada con 1/4 de taza de requezón (cottage cheese)  · 1/2 taza de ensalada de atún con 4 galletas saladas  · 2 pasteles de arroz (rice cakes) y agueda cucharada de mantequilla de cacahuate  · 1 manzana o 1 naranja pequeñas  · 3 tazas de palomitas de maíz "light"  · 1/2 sándwich de pavo que tenga agueda rebanada de pan de precious integral, 2 onzas de pavo y mostaza  El tamaño de las porciones es importante para controlar el azúcar en la sonali y mantener un peso saludable. Aprovisiónese de bocadillos saludables para que los tenga siempre a mano.  Cuándo debe comer  Yadav plan de comidas probablemente incluirá desayuno, almuerzo, susana y algunos bocadillos entre comidas.  · Intente que niyah comidas y bocadillos jenifer a la misma hora todos los días.  · Coma todas niyah comidas y bocadillos. Saltarse agueda comida o bocadillo puede hacer que baje demasiado el nivel de azúcar en yadav sonali y provocar que coma excesivamente más tarde, con lo que el nivel de azúcar en yadav sonali podría elevarse demasiado.  Date Last Reviewed: 3/31/2014  © 8641-6808 [employer]. [address] [place] [address]. Todos los derechos reservados. Esta información no pretende sustituir la atención médica " profesional. Sólo yadav médico puede diagnosticar y tratar un problema de jake.        Complicaciones de la diabetes a abdifatah plazo    Con el tiempo, la diabetes puede causar problemas de jake. A esto se le llama complicaciones. Es más probable que estos problemas ocurran si el nivel de azúcar en la sonali sobrepasa los límites adecuados a menudo. Con el tiempo, los altos niveles de azúcar en la sonali pueden dañar los vasos sanguíneos. Mantener el nivel de azúcar en la sonali dentro de los límites apropiados para usted puede prevenir o demorar la aparición de estas complicaciones.  Posibles complicaciones  Las complicaciones de la diabetes incluyen:  · Problemas de la vista, tales prerna daños en los vasos sanguíneos de los ojos (retinopatía), presión excesiva en el lucy (glaucoma) y opacamiento del cristalino del lucy (catarata). Los problemas de la vista pueden, en algún momento, provocar ceguera irreversible.   · Problemas en los dientes y las encías (enfermedad periodontal), causantes de la caída de los dientes y pérdida del hueso.  · Enfermedad de los vasos sanguíneos (vascular), que puede ocasionar problemas de la circulación, un infarto cardíaco o un ataque cerebral, o la necesidad de que deban amputarle agueda extremidad.   · Trastornos de la función sexual que provocan agueda disfunción eréctil en los hombres y molestias sexuales en las mujeres.   · Enfermedad renal (nefropatía) que puede, en algún momento, provocar agueda insuficiencia renal, lo que podría requerir diálisis o trasplante de riñón.   · Problemas de los nervios (neuropatía), causantes de dolor o la pérdida de sensibilidad en los pies y otras partes del cuerpo y que, potencialmente, pueden provocar la amputación de agueda extremidad.   · Presión arterial farhana (hipertensión), que causa esfuerzo sobre yadav corazón y niyah vasos sanguíneos.  · Infecciones graves, que pueden llegar a provocar la pérdida de dedos de los pies, los pies o las extremidades.  Cómo evitar  las complicaciones  La mayoría de la gente con diabetes puede evitar las consecuencias serias de estas complicaciones si manejan silvano niyah niveles de glucosa en la sonali, de presión arterial y de colesterol. [place] puede ayudarle a sentirse mejor y permanecer saludable. Puede manejar la diabetes haciendo un seguimiento del nivel de azúcar que tiene en yadav sonali. También puede comer de manera saludable y hacer actividad física para evitar subir de peso. Y debería wilfred los medicamentos que le indique yadav proveedor de atención médica.  Date Last Reviewed: 5/1/2016  © 4789-9962 [employer]. [address] [place] [address]. Todos los derechos reservados. Esta información no pretende sustituir la atención médica profesional. Sólo yadav médico puede diagnosticar y tratar un problema de jake.        Diabetes: Planificación de las comidas    Usted puede ayudar a mantener yadav nivel de azúcar sanguíneo dentro de los límites recomendados si lleva agueda alimentación adela. Yadav equipo de atención médica puede ayudarle a crear un plan de comidas nutritivo y bajo en grasas. Usted puede desempeñar un papel activo en el control de yadav diabetes si sigue yadav plan de comidas y trabaja con yadav equipo de atención médica.  Establezca un plan de comidas  Un plan de comidas beni las pautas sobre las clases y cantidades de alimentos que debe comer. El objetivo es equilibrar la comida y la insulina (u otros medicamentos para la diabetes) de manera que yadav nivel de azúcar en la sonali se mantenga dentro de los límites deseados. Yadav nutricionista le ayudará a hacer un plan de comidas flexible que incluya muchos de los alimentos que a usted le gustan.  Controle el tamaño de las raciones  Yadav plan de comidas agrupará los alimentos por raciones. Para saber cuánto contiene agueda ración, comience por medir las porciones de los alimentos en cada comida. En poco tiempo sabrá cuánto ocupa agueda ración en yadav plato. Pregunte a yadav proveedor de  atención médica cómo equilibrar raciones de alimentos diferentes.  Coma alimentos de todos los grupos  La base de un plan de comidas emory es la variedad (comer muchos tipos diferentes de alimentos). Escoja wilamr magras, frutas y verduras frescas, granos integrales y productos lácteos bajos en grasa o sin grasa. [place] agueda gran variedad de alimentos proporciona los nutrientes que yadav cuerpo necesita y también le puede ayudar a evitar el aburrimiento con yadav plan de comidas.  Obtenga información acerca de los carbohidratos, las grasas y las proteínas  · Los carbohidratos consisten en féculas, azúcares y fibra, y se encuentran en muchos alimentos, entre ellos la fruta, el pan, las pastas, la leche y los dulces. De todos los alimentos, los carbohidratos tienen el mayor efecto sobre el nivel de azúcar en la sonali. Yadav nutricionista puede enseñarle a contar los carbohidratos para saber cuántos carbohidratos ingiere cassandra agueda comida.  · Las grasas tienen el contenido más alto de calorías y el efecto más importante sobre yadav peso y sobre el riesgo de trastornos cardíacos. Cuando usted tiene diabetes, es importante que controle yadav peso y que tome medidas preventivas contra los problemas cardíacos. Entre los alimentos ricos en grasas se encuentran la leche entera, el queso, los bocadillos (snacks) y los postres.  · Las proteínas son importantes para fortalecer y reparar los músculos y los huesos. Escoja alimentos con proteínas bajas en grasa, prerna pescado, claras de huevo y orlando sin piel.  Reduzca los azúcares líquidos  Las calorías adicionales procedentes de los refrescos, bebidas para deportistas y jugos de fruta dificultan el mantenimiento de un nivel óptimo de azúcar en la sonali. Elimine en lo posible los azúcares líquidos de yadav plan de comidas. [place] incluye la mayoría de los jugos de fruta, que suelen tener un alto contenido de azúcar (natural o añadido). En vez de esto, es recomendable que denice abundante agua u otras  bebidas sin azúcar.  Coma menos grasas  Si necesita perder peso, intente reducir la cantidad de grasa en yadav dieta. [place] también puede ayudarle a reducir el nivel de colesterol para mantener más sanos los vasos sanguíneos. Agueda manera de reducir las grasas es utilizar sólo pequeñas cantidades de aceite para cocinar. [place] cuidadosamente las etiquetas de los alimentos para evitar aquellos que contengan grasas trans, que no son saludables.     El horario de las comidas  Para controlar el nivel de azúcar, lo importante no es solamente lo que come, sino también cuándo lo come. Es posible que deba comer varias comidas pequeñas, espaciadas de forma regular a lo abdifatah del día, para mantenerse dentro de los límites recomendados. Por lo tanto, no se salte el desayuno ni espere hasta agueda hora más avanzada en el día para obtener la mayor parte de niyah calorías, ya que esto puede provocar subidas o bajadas demasiado pat del nivel de azúcar en la sonali.   Date Last Reviewed: 3/31/2014  © [id number] [employer] [address] [place] [id number]. Todos los derechos reservados. Esta información no pretende sustituir la atención médica profesional. Sólo yadav médico puede diagnosticar y tratar un problema de jake.        Diabetes: Información sobre el tamaño de las raciones y porciones     Agueda buena daniel general: Dedique la mitad de yadav plato a verduras y ensalada cathi. Divida la otra mitad entre proteínas y carbohidratos. La fruta es agueda buena selección para el postre.     Raciones y porciones. ¿Cuál es la diferencia? Estos términos pueden prestarse a confusión. Noam aprender a medir correctamente el tamaño de las porciones puede ayudarle a calcular la cantidad de carbohidratos y otros alimentos que ingiere cada día y resultarle muy útil para controlar el peso.  Raciones y porciones  Se usan muchas palabras diferentes para describir las cantidades  de comida. Si yadav proveedor de atención médica utiliza agueda  palabra de cuyo significado usted no está seguro, no dude en preguntar. [place] podrá ayudarle a conocer la diferencia entre raciones y porciones.  · Agueda ración (serving size) es agueda cantidad fija. Los fabricantes de alimentos utilizan julia término para describir niyah productos. Por ejemplo, la etiqueta en un paquete de cereales puede decir que 1 taza de cereales secos = 1 ración.  · Agueda porción es la cantidad de alimento que usted come o pone en el plato cassandra agueda comida. Por ejemplo, usted puede comer 2 tazas de cereales cassandra el desayuno.     Onzas: 2 o 3 onzas es aproximadamente el tamaño de la guzman de la mano.       1 Taza: 1 taza (o agueda porción de tamaño intermedio) tiene aproximadamente el tamaño de un puño.        Media taza: Media taza es aproximadamente el tamaño de la juan de yadav mano.      Cómo usar la información sobre raciones (servings)  La porción que usted decida comer (sky prerna 2 tazas de cereal) puede ser mayor que el tamaño de agueda ración indicado en la etiqueta (1 taza de cereal). Por eso es útil medir o pesar los alimentos que va a comer. Puesto que los valores indicados en las etiquetas de los alimentos se basan en raciones, usted necesita saber cuántas raciones consume en cada comida.  Lleve cuenta de los tamaños de las raciones  Cuando esté planeando comer un bocadito o agueda comida, recuerde el tamaño de las raciones. Si no tiene a mano tazas de medir o agueda balanza, hay maneras de calcular a lucy el tamaño de las raciones, prerna comparando la cantidad de alimento con el tamaño de niyah janeth (neo las figuras).  Controle el tamaño de las porciones  Si le preocupa yadav peso, puede ser útil que reduzca el tamaño de las porciones. Puede comer más de agueda ración a la vez, raulito para evitar comer demasiado en agueda comida, aprenda a controlar el tamaño de las porciones. Agueda porción es la cantidad de cada tipo de comida en yadav plato. Abigail el diagrama prerna ejemplo de porciones silvano equilibradas.  Date  "Last Reviewed: 3/6/2014  © 9068-2318 [employer]. [address] [place] [address], [place] [address]. Todos los derechos reservados. Esta información no pretende sustituir la atención médica profesional. Sólo yadav médico puede diagnosticar y tratar un problema de jake.        Diabetes:Compra de alimentos y preparación de comidas    La diabetes no significa que deba comprar la comida en agueda sección especial o buscar alimentos especiales. Noam deberá seleccionar silvano lo que compra. Comparando diferentes productos y leyendo las etiquetas de los alimentos podrá encontrar las comidas más sanas para usted y yadav olga.  Comparación de productos  Cuando anupam la compra, compare los productos para encontrar los más adecuados a niyah necesidades. Recuerde esta información:  · No sugar added (sin azúcar añadido) no significa que el producto no contenga azúcar.  · "Sugar-free" significa menos de ½ gramo de azúcar por porción.  · "Reduced sugar" significa que tiene por o menos 25% menos de azúcar por porción que la versión regular.  · Fat free (sin grasa) significa que contiene menos de medio gramo de grasa por porción. [place] no significa necesariamente que el producto sea bajo en calorías.  · Low fat (bajo en grasa) significa que contiene 3 gramos o menos de grasa por porción. Reduced fat o less fat (grasa reducida o menos grasa) significa que contiene 25% menos grasa que la versión normal. Agueda parte de esta grasa puede ser saturada o trans y la cantidad de calorías por porción puede ser similar a la de la versión normal del producto.  Anupam cambios poco a poco  No trate de cambiar todos niyah hábitos de comida al mismo tiempo. Estas son algunas sugerencias para empezar:  · Seleccione queso, leche y yogur sin grasa o bajos en grasa. Seleccione también carne magra. [place] le ayudará a reducir el consumo de grasas saturadas.  · Intente comer pan integral, arroz silva y pasta integral.  · Compre abundantes " verduras frescas o congeladas. Si compra verduras en gerard, seleccione las que son bajas en sodio.  · Evite en lo posible las comidas procesadas, ya que suelen ser bajas en fibra y altas en grasas trans y en sodio.  · Compre tofu, leche de soya o sustitutos de la carne. Estos productos pueden ayudarle a reducir el colesterol y las grasas saturadas.  Cómo leer las etiquetas de los alimentos  Para encontrar comidas saludables que le ayuden a controlar el azúcar sanguíneo, es importante leer y entender las etiquetas de los alimentos. Busque la etiqueta de datos de nutrición en los alimentos empaquetados. Esta etiqueta le indicará la cantidad de carbohidratos, azúcar, grasa y fibra que hay en cada porción para que usted pueda decidir si damon alimento es adecuado a yadav plan de comidas.  Cómo usar las etiquetas de los alimentos  Agueda vez que ha leído la etiqueta de un alimento, ¿qué debe hacer con la información? La etiqueta del alimento puede ser útil de varias formas. Úsela para:  · Comparar productos y decidir cuál es mejor para niyah necesidades de jake.  · Llevar cuenta de la cantidad de carbohidratos en niyah porciones.  · Calcular cuántas porciones de un alimento puede comer sin sobrepasar la cantidad de carbohidratos planeada para franko comida.  Planificación de las comidas  Para un buen control del azúcar sanguíneo, planifique el contenido y la hora de niyah comidas. Comience por crear un plan de comidas que incluya todos los grupos de alimentos. A continuación, programe el horario de niyah comidas para mantener estable el nivel de azúcar sanguíneo. Es posible que necesite ajustar yadav plan para situaciones especiales.  Coma alimentos de todos los grupos  La base de un plan de comidas emory es la variedad (comer muchos tipos diferentes de alimentos). Escoja wilmar magras, frutas y verduras frescas, granos integrales y productos lácteos bajos en grasa o sin grasa. [place] agueda gran variedad de alimentos proporciona los nutrientes  que yadav cuerpo necesita y también le puede ayudar a evitar el aburrimiento con yadav plan de comidas.  Reduzca los azúcares líquidos  Las calorías adicionales procedentes de los refrescos, bebidas para deportistas y jugos de fruta dificultan el mantenimiento de un nivel óptimo de azúcar en la sonali. [place]lim[place] en lo posible los azúcares líquidos de yadav plan de comidas. [place] incluye la mayoría de los jugos de fruta, que suelen tener un alto contenido de azúcar (natural o añadido). En vez de esto, es recomendable que denice abundante agua u otros líquidos sin azúcar.  Coma menos grasas  Si necesita perder peso, intente reducir la cantidad de grasa en yadav dieta. [place] también puede ayudarle a reducir el nivel de colesterol para mantener más sanos los vasos sanguíneos. Agueda manera de reducir las grasas es utilizar sólo pequeñas cantidades de aceite para cocinar. [place] cuidadosamente las etiquetas de los alimentos para evitar aquellos que contengan grasas trans, que no son saludables.  El horario de las comidas  Para controlar el nivel de azúcar, lo importante no es solamente lo que come, sino también cuándo lo come. Es posible que deba comer varias comidas pequeñas, espaciadas de forma regular a lo abdifatah del día, para mantenerse dentro de los límites recomendados. Por lo tanto, no se salte el desayuno ni espere hasta agueda hora más avanzada en el día para obtener la mayor parte de niyah calorías, ya que esto puede provocar subidas o bajadas demasiado pat del nivel de azúcar sanguíneo.  Cocine de manera adela  · En vez de freír las wilmar y las verduras, cocínelas al horno, a la giuseppe o al vapor.  · En vez de las salsas basadas en crema o dulces glaseados, sazone las comidas con puré de verduras, jugo de fang o de lima, o hierbas aromáticas.  · Quite la piel del orlando y del pavo antes de servirlo.  · Busque recetas fáciles en los libros de cocina para preparar comidas bajas en grasa y en azúcar. Cuando prepare niyah recetas  habituales, reduzca el azúcar a la mitad y utilice un tercio menos de grasa.  Date Last Reviewed: 3/31/2014  © 6370-9012 [employer] [address] [place] [id number]. Todos los derechos reservados. Esta información no pretende sustituir la atención médica profesional. Sólo yadav médico puede diagnosticar y tratar un problema de jake.        Hiperglucemia (alto nivel de azúcar en la sonali)    El exceso de glucosa (azúcar) en la sonali se denomina hiperglucemia o hiperglicemia. Un nivel de azúcar en la sonali alto puede producir agueda afección peligrosa llamada cetoacidosis. En casos graves, puede provocar deshidratación y un coma.  Posibles causas de la hiperglucemia  · Plan de tratamiento de la diabetes inadecuado   · Enfermedad  · Estrés  · Ciertos medicamentos, prerna los esteroides  · Consumo excesivo de comida, especialmente carbohidratos  · Hacer menos actividad física de lo acostumbrado  · No wilfred o ponerse medicamento suficiente para la diabetes  Síntomas de la hiperglucemia  A veces, la hiperglucemia no produce ningún síntoma. Cuando se presentan, los síntomas de la hiperglucemia pueden ser:  · Sed  · Necesidad de orinar con más frecuencia  · Sensación de cansancio  · Náuseas y vómito  · Piel reseca y picazón  · Visión borrosa  · Respiración acelerada y aliento que huele a frutas  · Debilidad  · Mareos  · Heridas o infecciones en la piel que no sanan  · Pérdida de peso cuya causa no se conoce si la hiperglucemia dura más que unos pocos días   Lo que debe hacer  Asegúrese de hacer lo siguiente:  · Mídase yadav nivel de azúcar en la sonali.  · Denice abundante cantidad de líquidos sin azúcar ni cafeína, prerna el agua. No denice jugo de frutas.  · Vuelva a medirse el nivel de azúcar en sonali cada cuatro horas. Si se pone insulina o jhon pastillas para la diabetes, siga yadav plan de medicamentos para un día de enfermedad. Si no puede comer, llame a yadav proveedor de atención médica.  · Hágase un  examen de cetonas en la sonali o la orina, según las indicaciones.  · Llame a yadav proveedor de atención médica si yadav nivel de azúcar en la sonali y niyah cetonas no vuelven a niyah valores normales.  Cómo prevenir un nivel de azúcar en la sonali alto  Para ayudar a evitar que niyah niveles de azúcar en la sonali suban demasiado:  · Mantenga el estrés bajo control.  · Si está enfermo, siga yadav plan para los días de enfermedad.   · Cumpla con yadav plan de comidas. Coma solo la cantidad de comida que indica yadav plan de comidas.  · Siga yadav plan de ejercicio físico.  · Use yadav insulina o tome niyah medicamentos para la diabetes sky prerna le haya indicado yadav equipo de atención médica. También mídase el nivel de azúcar en la osnali según le hayan indicado. Si el plan no le funciona, convérselo con yadav proveedorde atención médica.  Otras cosas que puede hacer  · Lleve con usted agueda tarjeta de identificación médica, agueda memoria USB compacta o use un brazalete o agueda collar de alerta médica. Debe decir que usted tiene diabetes. También debe decir lo que hay que hacer si usted pierde el conocimiento o entra en coma.  · Asegúrese de que niyah familiares, amigos y compañeros de trabajo conozcan las señales de un nivel alto de azúcar en la sonali. Dígales lo que deben hacer si yadav nivel de azúcar sube mucho y no puede tratarse usted mismo.  · Hable con yadav equipo de atención médica para neo qué otras cosas puede hacer para prevenir un nivel de azúcar en la sonali alto.   Aviso especial: Cuando sienta síntomas de hiperglucemia, denice abundantes líquidos sin azúcar ni cafeína. Si tiene varios episodios de hiperglucemia, llame a yadav proveedor de atención médica.   Date Last Reviewed: 5/1/2016  © [id number] [employer] [address] [place] [address], [place] [address]. Todos los derechos reservados. Esta información no pretende sustituir la atención médica profesional. Sólo yadav médico puede diagnosticar y tratar un problema de ajke.

## 2017-05-30 NOTE — PROGRESS NOTES
Patient referred for OPCM to assist with diabetes. Spoke with daughter Gabriela Magana [phone] who states her mother/patient is in [place] as patient's father is not doing well. She will remain there for at least one month and maybe longer. Patient did take three months supply of medications this trip. Offered to provide [employer] teaching in [place] through portal for patient to access while on trip. Daughter verbalizes acceptance. Daughter will let patient and her brother who lives in [place] know that teaching material will be in portal. Case will be closed as patient is residing out of the country for now. Provided OPCM dept phone number for patient to call when back in [place].

## 2017-09-07 DIAGNOSIS — Z12.11 COLON CANCER SCREENING: ICD-10-CM

## 2017-10-04 DIAGNOSIS — E11.9 DIABETES MELLITUS WITHOUT COMPLICATION: ICD-10-CM

## 2017-10-06 DIAGNOSIS — E11.9 DIABETES MELLITUS WITHOUT COMPLICATION: ICD-10-CM

## 2017-12-08 ENCOUNTER — PATIENT OUTREACH (OUTPATIENT)
Dept: ADMINISTRATIVE | Facility: HOSPITAL | Age: 67
End: 2017-12-08

## 2017-12-08 ENCOUNTER — PATIENT MESSAGE (OUTPATIENT)
Dept: ADMINISTRATIVE | Facility: HOSPITAL | Age: 67
End: 2017-12-08

## 2017-12-15 DIAGNOSIS — E11.9 TYPE 2 DIABETES MELLITUS WITHOUT COMPLICATION: ICD-10-CM

## 2018-03-14 ENCOUNTER — PES CALL (OUTPATIENT)
Dept: ADMINISTRATIVE | Facility: CLINIC | Age: 68
End: 2018-03-14

## 2018-05-17 ENCOUNTER — PES CALL (OUTPATIENT)
Dept: ADMINISTRATIVE | Facility: CLINIC | Age: 68
End: 2018-05-17

## 2018-05-25 DIAGNOSIS — Z11.59 NEED FOR HEPATITIS C SCREENING TEST: ICD-10-CM

## 2018-06-20 ENCOUNTER — OFFICE VISIT (OUTPATIENT)
Dept: URGENT CARE | Facility: CLINIC | Age: 68
End: 2018-06-20
Payer: MEDICARE

## 2018-06-20 VITALS
BODY MASS INDEX: 25.74 KG/M2 | TEMPERATURE: 99 F | HEIGHT: 67 IN | DIASTOLIC BLOOD PRESSURE: 79 MMHG | WEIGHT: 164 LBS | HEART RATE: 78 BPM | RESPIRATION RATE: 18 BRPM | OXYGEN SATURATION: 96 % | SYSTOLIC BLOOD PRESSURE: 127 MMHG

## 2018-06-20 DIAGNOSIS — H01.00A BLEPHARITIS OF BOTH UPPER AND LOWER EYELID OF RIGHT EYE, UNSPECIFIED TYPE: ICD-10-CM

## 2018-06-20 DIAGNOSIS — B86 SCABIES: Primary | ICD-10-CM

## 2018-06-20 PROCEDURE — 3074F SYST BP LT 130 MM HG: CPT | Mod: CPTII,S$GLB,, | Performed by: PHYSICIAN ASSISTANT

## 2018-06-20 PROCEDURE — 99214 OFFICE O/P EST MOD 30 MIN: CPT | Mod: S$GLB,,, | Performed by: PHYSICIAN ASSISTANT

## 2018-06-20 PROCEDURE — 3078F DIAST BP <80 MM HG: CPT | Mod: CPTII,S$GLB,, | Performed by: PHYSICIAN ASSISTANT

## 2018-06-20 RX ORDER — PERMETHRIN 50 MG/G
CREAM TOPICAL ONCE
Qty: 60 G | Refills: 0 | Status: SHIPPED | OUTPATIENT
Start: 2018-06-20 | End: 2018-06-20

## 2018-06-20 RX ORDER — ERYTHROMYCIN 5 MG/G
OINTMENT OPHTHALMIC NIGHTLY
Qty: 1 TUBE | Refills: 0 | Status: SHIPPED | OUTPATIENT
Start: 2018-06-20 | End: 2018-06-25

## 2018-06-20 RX ORDER — ENALAPRIL MALEATE 10 MG/1
10 TABLET ORAL DAILY
COMMUNITY
End: 2018-06-25 | Stop reason: SDUPTHER

## 2018-06-20 NOTE — PATIENT INSTRUCTIONS
-Apply cream head to toe before bed, then wash off in the morning.  Repeat in 14 days if symptoms persist.  -Apply ointment to affected eye nightly.  -See attached handout for how to manage scabies in the house.    Please follow up with your primary care provider within 2-5 days if your signs and symptoms have not resolved or worsen.     If your condition worsens or fails to improve we recommend that you receive another evaluation at the emergency room immediately or contact your primary medical clinic to discuss your concerns.   You must understand that you have received an Urgent Care treatment only and that you may be released before all of your medical problems are known or treated. You, the patient, will arrange for follow up care as instructed.         Blefaritis    La blefaritis es agueda inflamación del párpado, por lo general, a causa de agueda infección bacteriana o agueda afección de la piel. La blefaritis es agueda afección ocular común. Hay dos tipos. La blefaritis anterior se produce en el nacimiento de las pestañas (extremo delantero exterior del lucy). La blefaritis posterior afecta el extremo interno del párpado que está en contacto con el globo ocular.   Además de la hinchazón de los párpados, los síntomas de la blefaritis pueden incluir escamas gruesas, amarillentas, similares a la caspa, que se adhieren al párpado. El párpado puede presentar zonas oleosas. Las pestañas pueden tener costras (con escamas similares a la caspa) cuando se despierta después de chris dormido. La elizabeth irritada puede picar. Los párpados pueden estar enrojecidos. Los ojos pueden aparecer enrojecidos y arder o picar. Los ojos pueden lagrimear mucho o estar secos. Puede tener sensibilidad a la hari o visión borrosa. Los síntomas de la blefaritis pueden causar irritabilidad.  La blefaritis es agueda afección crónica y es difícil de curar. Aun con un tratamiento exitoso, es común la recurrencia de la enfermedad. La higiene minuciosa y el  seguimiento de tratamientos caseros (megan a continuación en la sección Cuidados en yadav casa) pueden mejorar yadav afección.     Causas     Las causas de la blefaritis pueden incluir:  · Problemas con las glándulas sebáceas de los párpados (glándulas meibomianas)  · Caspa del cuero cabelludo y las alejandro (dermatitis seborreica)  · Acné rosácea (agueda afección de la piel que causa enrojecimiento del james y otros síntomas)  · Ácaros de las pestañas (pequeños organismos en los folículos de las pestañas)  · Reacciones alérgicas a cosméticos o medicamentos  Cuidados en yadav casa     Medicamentos: Yadav proveedor de atención médica puede recetarle gotas o agueda pomada antibiótica para los ojos, lágrimas artificiales y/o gotas oculares con esteroides. Siga todas las indicaciones para usar estos medicamentos. Use todos los medicamentos según indicación médica. Si siente dolor, tome medicamentos según la recomendación de yadav proveedor de atención médica.  · Lávese las janeth minuciosamente con agua tibia y jabón antes y después de realizarse el tratamiento en los ojos.  · Aplíquese agueda compresa tibia o un paño humedecido con agua tibia dos veces al día sobre los párpados para aflojar la costra. Luego de julia tratamiento, quite las escamas que pudiese tener en los párpados.  · Después de aplicarse las compresas tibias, frote suavemente la base de las pestañas cassandra 15 segundos por párpado. Para hacerlo, cierre los ojos y use agueda compresa para limpieza de párpados húmeda, un paño limpio o un hisopo. Consulte a yadav proveedor de atención médica sobre productos (prerna por ejemplo champú para bebé no irritante) que puede usar para limpiarse los párpados.  · Federico vez se le indique que masajee suavemente los párpados para ayudar a desbloquear las glándulas del párpado. Siga todas las instrucciones que le haya dado yadav proveedor de atención médica.  · A menos que le hayan indicado lo contrario, regularmente, con los ojos cerrados, límpiese los  párpados con champú antibacteriano según las indicaciones de yadav proveedor de atención médica. La blefaritis puede ser agueda afección marjan.  · No use maquillaje en los párpados hasta que la inflamación se vaya o según le haya indicado yadav proveedor de atención médica.  · Deje de usar lentes de contacto blandos hasta que haya finalizado el tratamiento para yadav afección, a menos que le indiquen lo contrario.  · Lávese las janeth regularmente para ayudar a reducir la posibilidad de que haya contacto de suciedad o bacterias con yadav párpado.  Visitas de control     Anupam agueda visita de control con yadav proveedor de atención médica, o según le hayan indicado. Yadav proveedor de atención médica puede derivarlo a un especialista en ojos (un optometrista u oftalmólogo) para más evaluación y tratamiento.  Cuándo debe buscar atención médica  Llame de inmediato a yadav proveedor de atención médica si algo de lo siguiente ocurre:     · Aumento del enrojecimiento de la parte brigida del lucy  · Aumento de la hinchazón, el enrojecimiento, la irritación o el dolor en los párpados  · Dolor en los ojos  · Cambio reciente en yadav visión (dificultad para neo o visión borrosa)  · Supuración (pus, sonali) que sale del párpado  · Fiebre de 100.4° F (38° C) o más farhana, o según le haya indicado yadav proveedor de atención médica  Date Last Reviewed: 10/9/2015  © 0769-8031 [employer]. [address] [place] [address], [place] [address]. Todos los derechos reservados. Esta información no pretende sustituir la atención médica profesional. Sólo yadav médico puede diagnosticar y tratar un problema de jake.        La Sarna [Scabies]  La Sarna es agueda enfermedad de la piel causada por un pequeño insecto, tan pequeño que solo se puede neo con un microscopio. El insecto de la sarna escava (se mete) por debajo de la piel y causa comezón (picazón) con ronchas (sarpullido) en las janeth, los pies, las axilas, las nalgas y en el área del jennifer.  [place] Prevenir El Contagio  A Otros:  La sarna es muy contagiosa. Es transmitida (se pasa de persona en persona) fácilmente por el contacto cercano o por compartir ropa de cama (sabanas y cobijas) o ropa común usada por agueda persona infectada.  Puede wilfred de 4 a 6 semanas para que los síntomas aparecen después de ser expuesto al insecto. Cada agueda de las personas que viven en la casa con agueda persona infectada, y lyly (o ) sexual de agueda persona infectada, deben de ser tratados al mismo tiempo. Después del primer tratamiento, usted no puede contagiar a otros y puede regresar a yadav trabajo, a la escuela o a la guardería de niños.  Cuidado En [place]:  Cuidado De [place]   Lave en la lavadora en [place] todas las sabanas, toallas, fundas, ropa interior, pijamas, y cualquiera otra ropa que recientemente haya usado. Seque en el ciclo caliente o use agueda plancha caliente.  La ropa difícil de miguel angel prerna los abrigos, chaquetas, las cobijas y las sobre-micki pueden ser puestos en agueda bolsa plástica debora sellada por 4 días. (La sarna se muere después de no estar sobre el cuerpo humano en 3 días.)  Uso De Medicinas   Use la medicina (Kwell o Eliminite) exactamente prerna se lo recetaron. Aplique antes de acostarse desde la barbilla hacia abajo, cubriendo toda la piel hasta los dedos de los pies. ¡Cubre aún las áreas que no parecen ser infectadas! Evite que la medicina toque los ojos y las áreas sensibles alrededor de la vagina y la punta del pene. Si por accidente la medicina toca agueda área sensible, enjuáguela con mucho agua.  Bhavna la medicina de yadav cuerpo después de 8 a 14 horas, lavando todas las áreas donde puso la medicina. No la deje en la piel más tiempo de lo que se le indico porque le pueden ocurrir efectos secundarios.  Un solo tratamiento con la medicina de la sarna matará los insectos de la piel, raulito puede wilfred de 2 a 4 semanas para que la comezón (picazón) y las ronchas (el sarpullido) desaparezcan. De pronto aumenta la comezón poco  después del tratamiento, raulito se sentirá mejor después de la primera semana.  Las mujeres debieran de decirle a yadav doctor, en dwaine de que están dando de pecho o si piensan que pueden estar EMBARAZADAS, antes de usar esta medicina.  Comezón (Picazón)   Benadryl oral (diphenhydramine) es un antihistamínico que se compra en la farmacia o en el supermercado sin receta médica. A menos de que le hayan recetado un antihistamínico, puede ser tomado para rebajar la comezón si tiene áreas grandes de la piel afectadas. [place] menos dosis cassandra el día y agueda dosis mas farhana de noche desde que esta medicina puede causarle sueño. [ NOTA : No tome Benadryl si tiene glaucoma o dificultades al orinar debido a agueda próstata agrandada]. Claritin (loratidine) es un otra antihistamínico que se puede comprar sin receta médica y que no causa sueño.  Para los ataques severos de comezón, aplique agueda compresa fría (pedazos de hielo en agueda bolsa plástica envuelta en agueda toalla) o use Lanacaine, Lanacort o Solarcaine (u otra medicina que contiene benzocaina y que se compra sin receta médica) sobre el área afectada.  Seguimiento  con yadav médico o según las instrucciones dadas si no mejora después de 1 semana.  Busque Prontamente Atención Médica  si algo de lo siguiente ocurre:  · Aumento del enrojecimiento de la piel  · Costras vivienne o café o supuración (drenaje - le sale líquido) de las úlceras (ampollas)  · Fiebre de 100.4°F (38°C) o más farhana, o prerna le haya indicado yadav proveedor de atención médica  Date Last Reviewed: 2/16/2014  © 7773-0831 [employer]. [address] [place] [address], [place] [address]. Todos los derechos reservados. Esta información no pretende sustituir la atención médica profesional. Sólo yadav médico puede diagnosticar y tratar un problema de jake.

## 2018-06-20 NOTE — PROGRESS NOTES
"Subjective:       Patient ID: Amanda Reyes is a 68 y.o. female.    Vitals:  height is 5' 7" (1.702 m) and weight is 74.4 kg (164 lb). Her temperature is 99.1 °F (37.3 °C). Her blood pressure is 127/79 and her pulse is 78. Her respiration is 18 and oxygen saturation is 96%.     Chief Complaint: Stye (RT EYE) and Rash    Eye Problem    The right eye is affected. The current episode started 1 to 4 weeks ago (2 WEEKS AGO). The problem occurs constantly. The problem has been gradually worsening. There was no injury mechanism. The pain is at a severity of 7/10. The pain is moderate. There is no known exposure to pink eye. She does not wear contacts. Pertinent negatives include no blurred vision, eye discharge, double vision, fever, foreign body sensation, nausea, photophobia or recent URI. She has tried eye drops for the symptoms. The treatment provided no relief.   Rash   This is a new problem. Episode onset: 2 weeks ago. The problem has been gradually worsening since onset. Location: web spaces between fingers. The rash is characterized by itchiness, redness and scaling. It is unknown if there was an exposure to a precipitant. Pertinent negatives include no fever, joint pain, shortness of breath or sore throat. Past treatments include nothing.     Review of Systems   Constitution: Negative for chills and fever.   HENT: Negative for sore throat.    Eyes: Negative for blurred vision, discharge, double vision and photophobia.   Respiratory: Negative for shortness of breath.    Skin: Positive for color change and rash.   Musculoskeletal: Negative for joint pain.   Gastrointestinal: Negative for nausea.       Objective:      Physical Exam   Constitutional: She is oriented to person, place, and time. Vital signs are normal. She appears well-developed and well-nourished. She does not appear ill. No distress.   HENT:   Head: Normocephalic and atraumatic.   Right Ear: External ear normal.   Left Ear: External ear " normal.   Nose: Nose normal.   Eyes: Conjunctivae, EOM and lids are normal. Pupils are equal, round, and reactive to light. Right eye exhibits no chemosis, no discharge, no exudate and no hordeolum. No foreign body present in the right eye. Left eye exhibits no chemosis, no discharge, no exudate and no hordeolum. No foreign body present in the left eye. Right conjunctiva is not injected. Right conjunctiva has no hemorrhage. Left conjunctiva is not injected. Left conjunctiva has no hemorrhage. No scleral icterus.   Right eyelid erythema and irritation; no swelling; no drainage   Neck: Normal range of motion. Neck supple.   Cardiovascular: Normal rate, regular rhythm and normal heart sounds.  Exam reveals no gallop and no friction rub.    No murmur heard.  Pulmonary/Chest: Effort normal and breath sounds normal. No respiratory distress. She has no wheezes. She has no rales.   Musculoskeletal: Normal range of motion.   Neurological: She is alert and oriented to person, place, and time.   Skin: Skin is warm and dry. Rash noted. She is not diaphoretic. No erythema. No pallor.   Erythema and excoriated papules in web spaces of fingers bilaterally; no visible drainage   Psychiatric: She has a normal mood and affect. Her behavior is normal.   Nursing note and vitals reviewed.      Assessment:       1. Scabies    2. Blepharitis of both upper and lower eyelid of right eye, unspecified type        Plan:       Patient's daughter presents with patient and is translating for patient. Discussed treatment options with patient and her daughter. They expressed verbal understanding and agreement with treatment plan.     Scabies  -     permethrin (ELIMITE) 5 % cream; Apply topically once. Massage cream from head to soles of feet; leave for 8 -14 hrs before removing (shower/bath). Rpt in 14 days if needed.  Dispense: 60 g; Refill: 0    Blepharitis of both upper and lower eyelid of right eye, unspecified type  -     erythromycin  (ROMYCIN) ophthalmic ointment; Place into the right eye every evening.  Dispense: 1 Tube; Refill: 0      Patient Instructions   -Apply cream head to toe before bed, then wash off in the morning.  Repeat in 14 days if symptoms persist.  -Apply ointment to affected eye nightly.  -See attached handout for how to manage scabies in the house.    Please follow up with your primary care provider within 2-5 days if your signs and symptoms have not resolved or worsen.     If your condition worsens or fails to improve we recommend that you receive another evaluation at the emergency room immediately or contact your primary medical clinic to discuss your concerns.   You must understand that you have received an Urgent Care treatment only and that you may be released before all of your medical problems are known or treated. You, the patient, will arrange for follow up care as instructed.         Blefaritis    La blefaritis es agueda inflamación del párpado, por lo general, a causa de agueda infección bacteriana o agueda afección de la piel. La blefaritis es agueda afección ocular común. Hay dos tipos. La blefaritis anterior se produce en el nacimiento de las pestañas (extremo delantero exterior del lucy). La blefaritis posterior afecta el extremo interno del párpado que está en contacto con el globo ocular.   Además de la hinchazón de los párpados, los síntomas de la blefaritis pueden incluir escamas gruesas, amarillentas, similares a la caspa, que se adhieren al párpado. El párpado puede presentar zonas oleosas. Las pestañas pueden tener costras (con escamas similares a la caspa) cuando se despierta después de chris dormido. La elizabeth irritada puede picar. Los párpados pueden estar enrojecidos. Los ojos pueden aparecer enrojecidos y arder o picar. Los ojos pueden lagrimear mucho o estar secos. Puede tener sensibilidad a la hari o visión borrosa. Los síntomas de la blefaritis pueden causar irritabilidad.  La blefaritis es agueda afección crónica  y es difícil de curar. Aun con un tratamiento exitoso, es común la recurrencia de la enfermedad. La higiene minuciosa y el seguimiento de tratamientos caseros (megan a continuación en la sección Cuidados en yadav casa) pueden mejorar yadav afección.     Causas     Las causas de la blefaritis pueden incluir:  · Problemas con las glándulas sebáceas de los párpados (glándulas meibomianas)  · Caspa del cuero cabelludo y las alejandro (dermatitis seborreica)  · Acné rosácea (agueda afección de la piel que causa enrojecimiento del james y otros síntomas)  · Ácaros de las pestañas (pequeños organismos en los folículos de las pestañas)  · Reacciones alérgicas a cosméticos o medicamentos  Cuidados en yadav casa     Medicamentos: Yadav proveedor de atención médica puede recetarle gotas o agueda pomada antibiótica para los ojos, lágrimas artificiales y/o gotas oculares con esteroides. Siga todas las indicaciones para usar estos medicamentos. Use todos los medicamentos según indicación médica. Si siente dolor, tome medicamentos según la recomendación de yadav proveedor de atención médica.  · Lávese las janeth minuciosamente con agua tibia y jabón antes y después de realizarse el tratamiento en los ojos.  · Aplíquese agueda compresa tibia o un paño humedecido con agua tibia dos veces al día sobre los párpados para aflojar la costra. Luego de julia tratamiento, quite las escamas que pudiese tener en los párpados.  · Después de aplicarse las compresas tibias, frote suavemente la base de las pestañas cassandra 15 segundos por párpado. Para hacerlo, cierre los ojos y use agueda compresa para limpieza de párpados húmeda, un paño limpio o un hisopo. Consulte a yadav proveedor de atención médica sobre productos (prerna por ejemplo champú para bebé no irritante) que puede usar para limpiarse los párpados.  · Federico vez se le indique que masajee suavemente los párpados para ayudar a desbloquear las glándulas del párpado. Siga todas las instrucciones que le haya dado yadav proveedor  de atención médica.  · A menos que le hayan indicado lo contrario, regularmente, con los ojos cerrados, límpiese los párpados con champú antibacteriano según las indicaciones de yadav proveedor de atención médica. La blefaritis puede ser agueda afección marjan.  · No use maquillaje en los párpados hasta que la inflamación se vaya o según le haya indicado yadav proveedor de atención médica.  · Deje de usar lentes de contacto blandos hasta que haya finalizado el tratamiento para yadav afección, a menos que le indiquen lo contrario.  · Lávese las janeth regularmente para ayudar a reducir la posibilidad de que haya contacto de suciedad o bacterias con yadav párpado.  Visitas de control     Anupam agueda visita de control con yadav proveedor de atención médica, o según le hayan indicado. Yadav proveedor de atención médica puede derivarlo a un especialista en ojos (un optometrista u oftalmólogo) para más evaluación y tratamiento.  Cuándo debe buscar atención médica  Llame de inmediato a yadav proveedor de atención médica si algo de lo siguiente ocurre:     · Aumento del enrojecimiento de la parte brigida del lucy  · Aumento de la hinchazón, el enrojecimiento, la irritación o el dolor en los párpados  · Dolor en los ojos  · Cambio reciente en yadav visión (dificultad para neo o visión borrosa)  · Supuración (pus, sonali) que sale del párpado  · Fiebre de 100.4° F (38° C) o más farhana, o según le haya indicado yadav proveedor de atención médica  Date Last Reviewed: 10/9/2015  © 5315-1278 [employer]. [address] [place] [address]. Todos los derechos reservados. Esta información no pretende sustituir la atención médica profesional. Sólo yadav médico puede diagnosticar y tratar un problema de jake.        La Sarna [Scabies]  La Sarna es agueda enfermedad de la piel causada por un pequeño insecto, tan pequeño que solo se puede neo con un microscopio. El insecto de la sarna escava (se mete) por debajo de la piel y causa comezón (picazón) con  ronchas (sarpullido) en las janeth, los pies, las axilas, las nalgas y en el área del jennifer.  [place] Prevenir El Contagio A Otros:  La sarna es muy contagiosa. Es transmitida (se pasa de persona en persona) fácilmente por el contacto cercano o por compartir ropa de cama (sabanas y cobijas) o ropa común usada por agueda persona infectada.  Puede wilfred de 4 a 6 semanas para que los síntomas aparecen después de ser expuesto al insecto. Cada agueda de las personas que viven en la casa con agueda persona infectada, y lyly (o ) sexual de agueda persona infectada, deben de ser tratados al mismo tiempo. Después del primer tratamiento, usted no puede contagiar a otros y puede regresar a yadav trabajo, a la escuela o a la guardería de niños.  Cuidado En [place]:  Cuidado De [place]   Lave en la lavadora en [place] todas las sabanas, toallas, fundas, ropa interior, pijamas, y cualquiera otra ropa que recientemente haya usado. Seque en el ciclo caliente o use agueda plancha caliente.  La ropa difícil de miguel angel shalom los abrigos, chaquetas, las cobijas y las sobre-micki pueden ser puestos en agueda bolsa plástica debora sellada por 4 días. (La sarna se muere después de no estar sobre el cuerpo humano en 3 días.)  Uso De Medicinas   Use la medicina (Kwell o Eliminite) exactamente shalom se lo recetaron. Aplique antes de acostarse desde la barbilla hacia abajo, cubriendo toda la piel hasta los dedos de los pies. ¡Cubre aún las áreas que no parecen ser infectadas! Evite que la medicina toque los ojos y las áreas sensibles alrededor de la vagina y la punta del pene. Si por accidente la medicina toca agueda área sensible, enjuáguela con mucho agua.  Bhavna la medicina de yadav cuerpo después de 8 a 14 horas, lavando todas las áreas donde puso la medicina. No la deje en la piel más tiempo de lo que se le indico porque le pueden ocurrir efectos secundarios.  Un solo tratamiento con la medicina de la sarna matará los insectos de la piel, raulito puede wilfred de 2 a 4  semanas para que la comezón (picazón) y las ronchas (el sarpullido) desaparezcan. De pronto aumenta la comezón poco después del tratamiento, raulito se sentirá mejor después de la primera semana.  Las mujeres debieran de decirle a yadav doctor, en dwaine de que están dando de pecho o si piensan que pueden estar EMBARAZADAS, antes de usar esta medicina.  Comezón (Picazón)   Benadryl oral (diphenhydramine) es un antihistamínico que se compra en la farmacia o en el supermercado sin receta médica. A menos de que le hayan recetado un antihistamínico, puede ser tomado para rebajar la comezón si tiene áreas grandes de la piel afectadas. [place] menos dosis cassandra el día y agueda dosis mas farhana de noche desde que esta medicina puede causarle sueño. [ NOTA : No tome Benadryl si tiene glaucoma o dificultades al orinar debido a agueda próstata agrandada]. Claritin (loratidine) es un otra antihistamínico que se puede comprar sin receta médica y que no causa sueño.  Para los ataques severos de comezón, aplique agueda compresa fría (pedazos de hielo en agueda bolsa plástica envuelta en agueda toalla) o use Lanacaine, Lanacort o Solarcaine (u otra medicina que contiene benzocaina y que se compra sin receta médica) sobre el área afectada.  Seguimiento  con yadav médico o según las instrucciones dadas si no mejora después de 1 semana.  Busque Prontamente Atención Médica  si algo de lo siguiente ocurre:  · Aumento del enrojecimiento de la piel  · Costras vivienne o café o supuración (drenaje - le sale líquido) de las úlceras (ampollas)  · Fiebre de 100.4°F (38°C) o más farhana, o prerna le haya indicado yadav proveedor de atención médica  Date Last Reviewed: 2/16/2014  © 8230-4867 [employer]. [address] [place] [address]. Todos los derechos reservados. Esta información no pretende sustituir la atención médica profesional. Sólo yadav médico puede diagnosticar y tratar un problema de jake.

## 2018-06-23 ENCOUNTER — HOSPITAL ENCOUNTER (EMERGENCY)
Facility: HOSPITAL | Age: 68
Discharge: HOME | End: 2018-06-23
Attending: EMERGENCY MEDICINE
Payer: MEDICARE

## 2018-06-23 VITALS
RESPIRATION RATE: 14 BRPM | OXYGEN SATURATION: 100 % | DIASTOLIC BLOOD PRESSURE: 78 MMHG | HEART RATE: 84 BPM | TEMPERATURE: 99 F | SYSTOLIC BLOOD PRESSURE: 138 MMHG

## 2018-06-23 DIAGNOSIS — T78.3XXA ALLERGIC ANGIOEDEMA, INITIAL ENCOUNTER: Primary | ICD-10-CM

## 2018-06-23 LAB — POCT GLUCOSE: 143 MG/DL (ref 70–110)

## 2018-06-23 PROCEDURE — 82962 GLUCOSE BLOOD TEST: CPT | Mod: 59

## 2018-06-23 PROCEDURE — 63600175 PHARM REV CODE 636 W HCPCS: Performed by: PHYSICIAN ASSISTANT

## 2018-06-23 PROCEDURE — 96372 THER/PROPH/DIAG INJ SC/IM: CPT

## 2018-06-23 PROCEDURE — 99283 EMERGENCY DEPT VISIT LOW MDM: CPT | Mod: 25

## 2018-06-23 RX ORDER — PERMETHRIN 50 MG/G
CREAM TOPICAL ONCE
COMMUNITY
End: 2018-06-25

## 2018-06-23 RX ORDER — LORAZEPAM 2 MG/ML
INJECTION INTRAMUSCULAR
Status: DISCONTINUED
Start: 2018-06-23 | End: 2018-06-23 | Stop reason: WASHOUT

## 2018-06-23 RX ORDER — DEXAMETHASONE SODIUM PHOSPHATE 4 MG/ML
8 INJECTION, SOLUTION INTRA-ARTICULAR; INTRALESIONAL; INTRAMUSCULAR; INTRAVENOUS; SOFT TISSUE
Status: COMPLETED | OUTPATIENT
Start: 2018-06-23 | End: 2018-06-23

## 2018-06-23 RX ORDER — HYDROXYZINE HYDROCHLORIDE 25 MG/1
25 TABLET, FILM COATED ORAL EVERY 6 HOURS
Qty: 12 TABLET | Refills: 0 | Status: SHIPPED | OUTPATIENT
Start: 2018-06-23 | End: 2021-06-14

## 2018-06-23 RX ADMIN — DEXAMETHASONE SODIUM PHOSPHATE 8 MG: 4 INJECTION, SOLUTION INTRAMUSCULAR; INTRAVENOUS at 07:06

## 2018-06-24 NOTE — ED PROVIDER NOTES
Encounter Date: 6/23/2018       History     Chief Complaint   Patient presents with    Facial Swelling     swelling to R eye x1 week, moving to L eye x2 days     60-year-old female with PMH of DM to and HTN presents the ED complaining of swelling to her right eye x1 week and left eye x2 days.  Patient was seen at urgent care 2 days ago, was diagnosed with blepharitis, and given erythromycin ophthalmic ointment.  Patient states symptoms have not improved and swelling has since spread to her left eye.  Also admits to itching and mild pain. Denies visual disturbance, discharge, fever, allergies, sore throat, scratchy throat, trouble breathing.      The history is provided by the patient. A  was used (Patient's daughter).     Review of patient's allergies indicates:  No Known Allergies  Past Medical History:   Diagnosis Date    Abnormal Pap smear     Anxiety     Arthritis     Cataract     Cataract of left eye     Diabetes     Diabetes mellitus, type 2     Eye problem     GERD (gastroesophageal reflux disease)     Head and face pain     Hypertension      Past Surgical History:   Procedure Laterality Date    CATARACT EXTRACTION W/  INTRAOCULAR LENS IMPLANT Left 5/12/2015    Dr. Fermin    CATARACT EXTRACTION W/  INTRAOCULAR LENS IMPLANT Right 03/21/2017    Dr. Fermin    HYSTERECTOMY      JOINT REPLACEMENT      bilateral knees    KIDNEY SURGERY      right nephrectomy    TUBAL LIGATION       Family History   Problem Relation Age of Onset    Hypertension Mother     Hypertension Father     Cataracts Father     No Known Problems Maternal Grandmother     No Known Problems Maternal Grandfather     No Known Problems Paternal Grandmother     No Known Problems Paternal Grandfather     No Known Problems Sister     No Known Problems Brother     No Known Problems Maternal Aunt     No Known Problems Maternal Uncle     No Known Problems Paternal Aunt     No Known Problems  Paternal Uncle     Anesthesia problems Neg Hx     Amblyopia Neg Hx     Blindness Neg Hx     Glaucoma Neg Hx     Macular degeneration Neg Hx     Retinal detachment Neg Hx     Strabismus Neg Hx     Cancer Neg Hx     Diabetes Neg Hx     Stroke Neg Hx     Thyroid disease Neg Hx      Social History   Substance Use Topics    Smoking status: Never Smoker    Smokeless tobacco: Not on file    Alcohol use No     Review of Systems   Constitutional: Negative for chills and fever.   HENT: Negative for sore throat.    Eyes: Positive for pain and itching. Negative for visual disturbance.        Periorbital edema   Respiratory: Negative for shortness of breath.    Cardiovascular: Negative for chest pain.   Gastrointestinal: Negative for nausea.   Genitourinary: Negative for dysuria.   Musculoskeletal: Negative for back pain.   Skin: Negative for rash.   Neurological: Negative for weakness.   Hematological: Does not bruise/bleed easily.   All other systems reviewed and are negative.      Physical Exam     Initial Vitals [06/23/18 1906]   BP Pulse Resp Temp SpO2   (!) 142/77 76 18 98.6 °F (37 °C) 99 %      MAP       --         Physical Exam    Nursing note and vitals reviewed.  Constitutional: Vital signs are normal. She appears well-developed and well-nourished. No distress.   HENT:   Head: Normocephalic and atraumatic.   Nose: Nose normal.   Eyes: Conjunctivae and EOM are normal. Pupils are equal, round, and reactive to light. Right eye exhibits no discharge. Left eye exhibits no discharge. Right conjunctiva is not injected. Left conjunctiva is not injected.   The periorbital edema noted greater on right than left with mild erythema.  Nontender to palpation   Neck: Normal range of motion and phonation normal.   Cardiovascular: Normal rate.   Pulmonary/Chest: Effort normal. No respiratory distress.   Abdominal: Normal appearance.   Musculoskeletal: Normal range of motion.   Neurological: She is alert and oriented to  person, place, and time. GCS eye subscore is 4. GCS verbal subscore is 5. GCS motor subscore is 6.   Skin: Skin is warm and dry. Rash noted. Rash is maculopapular (forehead).   Psychiatric: She has a normal mood and affect. Her speech is normal and behavior is normal. Judgment and thought content normal. Cognition and memory are normal.         ED Course   Procedures  Labs Reviewed   POCT GLUCOSE - Abnormal; Notable for the following:        Result Value    POCT Glucose 143 (*)     All other components within normal limits   POCT GLUCOSE MONITORING CONTINUOUS          Imaging Results    None          Medical Decision Making:   Initial Assessment:   60-year-old female presents the ED complaining of swelling to her right eye x1 week and left eye x2 days.  Patient was seen at urgent care 2 days ago, was diagnosed with blepharitis, and given erythromycin ophthalmic ointment.  Patient states symptoms have not improved and swelling has since spread to her left eye.  Also admits to itching and mild pain. Denies visual disturbance, discharge, fever, allergies, sore throat, scratchy throat, trouble breathing.  Physical exam reveals periorbital edema greater on right than left with mild erythema, maculopapular rash also noted to forehead.  Exam otherwise unremarkable  Differential Diagnosis:   Allergic periorbital edema, contact dermatitis, blepharitis  ED Management:  Patient given Decadron in ED.  She will be discharged with prescription for Atarax and is instructed to follow up with her PCP as she is already scheduled in 2 days.  Return to ED if symptoms worsen.              Attending Attestation:     Physician Attestation Statement for NP/PA:   I have conducted a face to face encounter with this patient in addition to the NP/PA, due to NP/PA Request    Other NP/PA Attestation Additions:    History of Present Illness: Agree; 60-year-old female presents emergency department complaining of swelling to left eye for 2 weeks  and right eye x2 days.  She reports the areas are itchy with minimal pain.  Was seen at an urgent care and given erythromycin ointment without improvement.  No other symptoms reported.  Denies any vision changes.   Physical Exam: Agree   Medical Decision Making: Agree; patient given Decadron in the ER and will be given prescription for Atarax, instructed on home management, follow up with primary care physician tomorrow as scheduled                    Clinical Impression:   The encounter diagnosis was Allergic angioedema, initial encounter.                             Aliza Mccollum PA-C  06/23/18 0767       Kee Torrez MD  06/25/18 8949

## 2018-06-24 NOTE — ED TRIAGE NOTES
Pt presents to ED with C/O bilat eye swelling that started about 1 wk ago to the inner R eye and has progressively gotten worse pt states both eyes are itchy including  Her forehead. And around her eyes. Pt states pain to eyes is 7/10. Pt states she took benadryl x2 doses yesterday and x2 doses today. Pt states  Pedroza. Pt denies any nasal congestion, sob, difficulty breathing. Comfort and safety measures provided. Will continue to monitor.

## 2018-06-25 ENCOUNTER — OFFICE VISIT (OUTPATIENT)
Dept: FAMILY MEDICINE | Facility: CLINIC | Age: 68
End: 2018-06-25
Payer: MEDICARE

## 2018-06-25 ENCOUNTER — TELEPHONE (OUTPATIENT)
Dept: FAMILY MEDICINE | Facility: CLINIC | Age: 68
End: 2018-06-25

## 2018-06-25 ENCOUNTER — LAB VISIT (OUTPATIENT)
Dept: LAB | Facility: HOSPITAL | Age: 68
End: 2018-06-25
Attending: FAMILY MEDICINE
Payer: MEDICARE

## 2018-06-25 ENCOUNTER — CLINICAL SUPPORT (OUTPATIENT)
Dept: FAMILY MEDICINE | Facility: CLINIC | Age: 68
End: 2018-06-25
Attending: FAMILY MEDICINE
Payer: MEDICARE

## 2018-06-25 VITALS
OXYGEN SATURATION: 97 % | HEIGHT: 67 IN | BODY MASS INDEX: 25.22 KG/M2 | SYSTOLIC BLOOD PRESSURE: 84 MMHG | DIASTOLIC BLOOD PRESSURE: 50 MMHG | WEIGHT: 160.69 LBS | HEART RATE: 70 BPM

## 2018-06-25 DIAGNOSIS — E11.65 TYPE 2 DIABETES MELLITUS WITH HYPERGLYCEMIA, WITHOUT LONG-TERM CURRENT USE OF INSULIN: ICD-10-CM

## 2018-06-25 DIAGNOSIS — Z00.00 ANNUAL PHYSICAL EXAM: ICD-10-CM

## 2018-06-25 DIAGNOSIS — K21.9 GASTROESOPHAGEAL REFLUX DISEASE, ESOPHAGITIS PRESENCE NOT SPECIFIED: ICD-10-CM

## 2018-06-25 DIAGNOSIS — F33.1 MODERATE EPISODE OF RECURRENT MAJOR DEPRESSIVE DISORDER: ICD-10-CM

## 2018-06-25 DIAGNOSIS — Z12.11 SCREEN FOR COLON CANCER: ICD-10-CM

## 2018-06-25 DIAGNOSIS — E11.43 TYPE 2 DIABETES MELLITUS WITH DIABETIC AUTONOMIC NEUROPATHY, WITHOUT LONG-TERM CURRENT USE OF INSULIN: ICD-10-CM

## 2018-06-25 DIAGNOSIS — Z78.0 ASYMPTOMATIC MENOPAUSAL STATE: ICD-10-CM

## 2018-06-25 DIAGNOSIS — H93.12 TINNITUS OF LEFT EAR: ICD-10-CM

## 2018-06-25 DIAGNOSIS — Z11.59 NEED FOR HEPATITIS C SCREENING TEST: ICD-10-CM

## 2018-06-25 DIAGNOSIS — E11.3299 MILD NONPROLIFERATIVE DIABETIC RETINOPATHY WITHOUT MACULAR EDEMA ASSOCIATED WITH TYPE 2 DIABETES MELLITUS, UNSPECIFIED LATERALITY: Primary | ICD-10-CM

## 2018-06-25 DIAGNOSIS — H10.89 OTHER CONJUNCTIVITIS OF RIGHT EYE: ICD-10-CM

## 2018-06-25 DIAGNOSIS — E78.5 DYSLIPIDEMIA: ICD-10-CM

## 2018-06-25 DIAGNOSIS — I10 ESSENTIAL HYPERTENSION: ICD-10-CM

## 2018-06-25 DIAGNOSIS — Z00.00 ANNUAL PHYSICAL EXAM: Primary | ICD-10-CM

## 2018-06-25 DIAGNOSIS — R32 URINARY INCONTINENCE, UNSPECIFIED TYPE: ICD-10-CM

## 2018-06-25 LAB
ALBUMIN SERPL BCP-MCNC: 4.3 G/DL
ALP SERPL-CCNC: 57 U/L
ALT SERPL W/O P-5'-P-CCNC: 12 U/L
ANION GAP SERPL CALC-SCNC: 11 MMOL/L
AST SERPL-CCNC: 14 U/L
BASOPHILS # BLD AUTO: 0.06 K/UL
BASOPHILS NFR BLD: 0.6 %
BILIRUB SERPL-MCNC: 0.6 MG/DL
BUN SERPL-MCNC: 22 MG/DL
CALCIUM SERPL-MCNC: 10.1 MG/DL
CHLORIDE SERPL-SCNC: 100 MMOL/L
CHOLEST SERPL-MCNC: 160 MG/DL
CHOLEST/HDLC SERPL: 2.3 {RATIO}
CO2 SERPL-SCNC: 29 MMOL/L
CREAT SERPL-MCNC: 1.1 MG/DL
CREAT UR-MCNC: 89 MG/DL
DIFFERENTIAL METHOD: NORMAL
EOSINOPHIL # BLD AUTO: 0.2 K/UL
EOSINOPHIL NFR BLD: 1.9 %
ERYTHROCYTE [DISTWIDTH] IN BLOOD BY AUTOMATED COUNT: 12.8 %
EST. GFR  (AFRICAN AMERICAN): 59.6 ML/MIN/1.73 M^2
EST. GFR  (NON AFRICAN AMERICAN): 51.7 ML/MIN/1.73 M^2
ESTIMATED AVG GLUCOSE: 171 MG/DL
GLUCOSE SERPL-MCNC: 148 MG/DL
HBA1C MFR BLD HPLC: 7.6 %
HCT VFR BLD AUTO: 38.8 %
HDLC SERPL-MCNC: 69 MG/DL
HDLC SERPL: 43.1 %
HGB BLD-MCNC: 13 G/DL
IMM GRANULOCYTES # BLD AUTO: 0.03 K/UL
IMM GRANULOCYTES NFR BLD AUTO: 0.3 %
LDLC SERPL CALC-MCNC: 65.4 MG/DL
LYMPHOCYTES # BLD AUTO: 4.4 K/UL
LYMPHOCYTES NFR BLD: 45.5 %
MCH RBC QN AUTO: 29.5 PG
MCHC RBC AUTO-ENTMCNC: 33.5 G/DL
MCV RBC AUTO: 88 FL
MICROALBUMIN UR DL<=1MG/L-MCNC: 15 UG/ML
MICROALBUMIN/CREATININE RATIO: 16.9 UG/MG
MONOCYTES # BLD AUTO: 0.4 K/UL
MONOCYTES NFR BLD: 4.4 %
NEUTROPHILS # BLD AUTO: 4.6 K/UL
NEUTROPHILS NFR BLD: 47.3 %
NONHDLC SERPL-MCNC: 91 MG/DL
NRBC BLD-RTO: 0 /100 WBC
PLATELET # BLD AUTO: 188 K/UL
PMV BLD AUTO: 12 FL
POTASSIUM SERPL-SCNC: 3.5 MMOL/L
PROT SERPL-MCNC: 7.5 G/DL
RBC # BLD AUTO: 4.41 M/UL
SODIUM SERPL-SCNC: 140 MMOL/L
TRIGL SERPL-MCNC: 128 MG/DL
TSH SERPL DL<=0.005 MIU/L-ACNC: 0.84 UIU/ML
WBC # BLD AUTO: 9.61 K/UL

## 2018-06-25 PROCEDURE — 99999 PR PBB SHADOW E&M-EST. PATIENT-LVL IV: CPT | Mod: PBBFAC,,, | Performed by: FAMILY MEDICINE

## 2018-06-25 PROCEDURE — 36415 COLL VENOUS BLD VENIPUNCTURE: CPT | Mod: PO

## 2018-06-25 PROCEDURE — 99397 PER PM REEVAL EST PAT 65+ YR: CPT | Mod: S$GLB,,, | Performed by: FAMILY MEDICINE

## 2018-06-25 PROCEDURE — 80061 LIPID PANEL: CPT

## 2018-06-25 PROCEDURE — 84443 ASSAY THYROID STIM HORMONE: CPT

## 2018-06-25 PROCEDURE — 82043 UR ALBUMIN QUANTITATIVE: CPT

## 2018-06-25 PROCEDURE — 3074F SYST BP LT 130 MM HG: CPT | Mod: CPTII,S$GLB,, | Performed by: FAMILY MEDICINE

## 2018-06-25 PROCEDURE — 92250 FUNDUS PHOTOGRAPHY W/I&R: CPT | Mod: S$GLB,,, | Performed by: OPTOMETRIST

## 2018-06-25 PROCEDURE — 3078F DIAST BP <80 MM HG: CPT | Mod: CPTII,S$GLB,, | Performed by: FAMILY MEDICINE

## 2018-06-25 PROCEDURE — 86803 HEPATITIS C AB TEST: CPT

## 2018-06-25 PROCEDURE — 85025 COMPLETE CBC W/AUTO DIFF WBC: CPT

## 2018-06-25 PROCEDURE — 99999 PR PBB SHADOW E&M-EST. PATIENT-LVL II: CPT | Mod: PBBFAC,,,

## 2018-06-25 PROCEDURE — 99499 UNLISTED E&M SERVICE: CPT | Mod: S$GLB,,, | Performed by: FAMILY MEDICINE

## 2018-06-25 PROCEDURE — 80053 COMPREHEN METABOLIC PANEL: CPT

## 2018-06-25 PROCEDURE — 83036 HEMOGLOBIN GLYCOSYLATED A1C: CPT

## 2018-06-25 RX ORDER — AMLODIPINE BESYLATE 5 MG/1
5 TABLET ORAL DAILY
Qty: 90 TABLET | Refills: 3 | Status: SHIPPED | OUTPATIENT
Start: 2018-06-25 | End: 2021-06-14 | Stop reason: SDUPTHER

## 2018-06-25 RX ORDER — NEOMYCIN SULFATE, POLYMYXIN B SULFATE AND DEXAMETHASONE 3.5; 10000; 1 MG/ML; [USP'U]/ML; MG/ML
1 SUSPENSION/ DROPS OPHTHALMIC EVERY 6 HOURS
Qty: 5 ML | Refills: 0 | Status: SHIPPED | OUTPATIENT
Start: 2018-06-25 | End: 2018-07-11 | Stop reason: ALTCHOICE

## 2018-06-25 RX ORDER — HYDROCHLOROTHIAZIDE 25 MG/1
25 TABLET ORAL DAILY
Qty: 90 TABLET | Refills: 3 | Status: SHIPPED | OUTPATIENT
Start: 2018-06-25 | End: 2021-06-14 | Stop reason: SDUPTHER

## 2018-06-25 RX ORDER — ENALAPRIL MALEATE 10 MG/1
10 TABLET ORAL DAILY
Qty: 90 TABLET | Refills: 3 | Status: SHIPPED | OUTPATIENT
Start: 2018-06-25 | End: 2019-07-24 | Stop reason: SDUPTHER

## 2018-06-25 RX ORDER — FESOTERODINE FUMARATE 4 MG/1
4 TABLET, EXTENDED RELEASE ORAL DAILY
Qty: 90 TABLET | Refills: 3 | Status: SHIPPED | OUTPATIENT
Start: 2018-06-25 | End: 2021-06-14

## 2018-06-25 RX ORDER — LOVASTATIN 20 MG/1
20 TABLET ORAL NIGHTLY
Qty: 90 TABLET | Refills: 3 | Status: SHIPPED | OUTPATIENT
Start: 2018-06-25 | End: 2021-06-14 | Stop reason: SDUPTHER

## 2018-06-25 RX ORDER — METFORMIN HYDROCHLORIDE 500 MG/1
500 TABLET ORAL 2 TIMES DAILY WITH MEALS
Qty: 180 TABLET | Refills: 3 | Status: SHIPPED | OUTPATIENT
Start: 2018-06-25 | End: 2018-08-02 | Stop reason: SDUPTHER

## 2018-06-25 RX ORDER — OMEPRAZOLE 20 MG/1
20 CAPSULE, DELAYED RELEASE ORAL
Qty: 90 CAPSULE | Refills: 3 | Status: SHIPPED | OUTPATIENT
Start: 2018-06-25 | End: 2021-06-14 | Stop reason: SDUPTHER

## 2018-06-25 NOTE — PROGRESS NOTES
Amanda Reyes is a 68 y.o. female here for a diabetic eye screening with non-dilated fundus photos per Dr Diaz    Patient cooperative?: Yes  Small pupils?: Yes  Last eye exam: 2017    For exam results, see Encounter Report.

## 2018-06-25 NOTE — Clinical Note
Mild nonproliferative diabetic retinopathy of both eyes. Recommend comprehensive dilated fundus exam in 4-6 months with primary eye care when convenient.

## 2018-06-25 NOTE — PATIENT INSTRUCTIONS
La diabetes: Consejos para hacer actividades físicas    Aumentar yadav nivel de actividad física puede ayudarle a controlar la diabetes. Los consejos de esta página le ayudarán a aprovechar yadav ejercicio al kimberli y a proteger yadav seguridad.  Benefíciese con brío  Las actividades enérgicas aceleran el ritmo cardíaco, lo que puede ayudarle a mejorar yadav forma física, perder el peso excesivo y controlar yadav nivel de azúcar en la sonali. Pruebe a caminar con energía y brío. Si tiene problemas en los pies o las piernas, pruebe a hacer natación o montar en bicicleta. Puede dividir niyah sesiones de ejercicio en varios intervalos a lo abdifatah del día. Avance gradualmente hasta hacer por lo menos 30 minutos de ejercicio continuo y enérgico marcelle todos los jennifer.  Pooja ejercicios de calentamiento y enfriamiento  Los ejercicios de calentamiento y enfriamiento reducen el riesgo de lesiones y el dolor muscular. Antes y después de yadav rutina de ejercicios, pooja agueda versión suave de yadav actividad cassandra 5 minutos. También puede aprender a hacer estiramientos para mantener los músculos relajados. Yadav proveedor de atención médica puede enseñarle buenos ejercicios de calentamiento y estiramiento.  Pooja la prueba del habla-amara  La prueba del habla-amara es agueda manera sencilla de medir el esfuerzo que usted hace cassandra yadav ejercicio. Si puede hablar mientras hace ejercicios, significa que yadav actividad tiene el ritmo adecuado; raulito si le falta el aire, disminuya la marcha. Si puede tararear agueda canción, significa que debe acelerar el ritmo. Suba agueda lucía, aumente la resistencia de yadav bicicleta estacionaria o nade más rápido.  ¿Qué eleonora hacer respecto a las comidas?  Es posible que le indiquen que planifique yadav actividad física para 1-2 horas después de agueda comida. En la mayoría de los casos, no es necesario que coma mientras hace ejercicio. Si usted se pone insulina o jhon medicamentos que pueden provocar la disminución de los niveles de  azúcar en la sonali, hágase el examen antes de hacer ejercicios. Lleve consigo un azúcar de acción rápida, prerna tabletas de glucosa o caramelos duros, que le elevará rápidamente el nivel de azúcar en la sonali. Si tiene síntomas de hipoglucemia (bajo nivel de azúcar en la sonali), use julia azúcar.  Consejos de seguridad  Estos consejos le ayudarán a mantener yadav seguridad mientras mejora yadav forma física:  · Pooja ejercicios con un amigo o lleve consigo un teléfono celular, si tiene scot.  · Lleve o póngase agueda identificación, prerna agueda pulsera o agueda greer, que indique que usted tiene diabetes.  · Use zapatos y equipo de seguridad apropiados para yadav actividad.  · [place] agua antes, cassandra y después del ejercicio.  · Póngase ropa adecuada para el clima.  · No pooja ejercicio a la intemperie si hace demasiado calor o demasiado frío.  · No pooja ejercicio si está enfermo.  · Si le indican que lo pooja, mídase el azúcar en la sonali antes y después de hacer ejercicio. Coma un bocadillo de carbohidrato si yadav nivel de azúcar es bajo antes de iniciar el ejercicio.  Cuándo debe parar y llamar al proveedor de atención médica  Deje de hacer ejercicios y llame a yadav proveedor de atención médica de inmediato si tiene cualquiera de estos síntomas:  · Dolor, opresión, sensación de tirantez o pesadez en el pecho.  · Dolor o pesadez en el blessing, los hombros, la espalda, los brazos, las piernas o los pies  · Falta de aire excesiva  · Mareos o aturdimiento  · Pulso excesivamente rápido o lento  · Mayor dolor en las articulaciones o los músculos  · Náuseas o vómito  Date Last Reviewed: 5/1/2016  © 6883-9025 [employer]. [address] [place] [id number]. Todos los derechos reservados. Esta información no pretende sustituir la atención médica profesional. Sólo yadav médico puede diagnosticar y tratar un problema de jake.

## 2018-06-25 NOTE — TELEPHONE ENCOUNTER
Patient with mild retinopathy.  Ophthalmology referral was done.    Please notify the patient with appointment.

## 2018-06-25 NOTE — PROGRESS NOTES
Subjective:       Patient ID: Amanda Reyes is a 68 y.o. female.    Chief Complaint: Tinnitus (started x one month ago); Annual Exam; and Facial Swelling (one week)    68 years old female who came to the clinic for her physical examination.  Patient with right eye redness associated with allergies.  Patient was treated with topical erythromycin for the last week with no significant improvement.  Patient is not taking the Zoloft for depression.  No suicidal or homicidal ideations.  Patient with urinary incontinence with significant improvement with medical treatment.  Patient with left ear tinnitus for the last several months.  Patient requests ENT evaluation for hearing testing.      Ringing in Ears:    Associated symptoms: tinnitus.  No headaches.    Review of Systems   Constitutional: Negative.  Negative for activity change.   HENT: Positive for tinnitus.    Eyes: Negative.  Negative for discharge.   Respiratory: Negative.  Negative for wheezing.    Cardiovascular: Negative.  Negative for chest pain and palpitations.   Gastrointestinal: Negative.  Negative for constipation, diarrhea and vomiting.   Genitourinary: Negative.  Negative for difficulty urinating and hematuria.   Musculoskeletal: Negative.    Skin: Negative.    Neurological: Negative.  Negative for headaches.   Psychiatric/Behavioral: Positive for dysphoric mood.       Objective:      Physical Exam   Constitutional: She is oriented to person, place, and time. She appears well-developed and well-nourished. No distress.   HENT:   Head: Normocephalic and atraumatic.   Right Ear: External ear normal.   Left Ear: External ear normal.   Nose: Nose normal.   Mouth/Throat: Oropharynx is clear and moist. No oropharyngeal exudate.   Eyes: Conjunctivae and EOM are normal. Pupils are equal, round, and reactive to light. Right eye exhibits no discharge. Left eye exhibits no discharge. No scleral icterus.   Neck: Normal range of motion. Neck supple. No JVD  present. No tracheal deviation present. No thyromegaly present.   Cardiovascular: Normal rate, regular rhythm, normal heart sounds and intact distal pulses.  Exam reveals no gallop and no friction rub.    No murmur heard.  Pulmonary/Chest: Effort normal and breath sounds normal. No stridor. No respiratory distress. She has no wheezes. She has no rales. She exhibits no tenderness.   Abdominal: Soft. Bowel sounds are normal. She exhibits no distension and no mass. There is no tenderness. There is no rebound and no guarding.   Musculoskeletal: Normal range of motion. She exhibits no edema or tenderness.   Feet:   Right Foot:   Protective Sensation: 10 sites tested. 7 sites sensed.   Skin Integrity: Negative for ulcer, blister, skin breakdown, erythema, warmth, callus or dry skin.   Left Foot:   Protective Sensation: 10 sites tested. 7 sites sensed.   Skin Integrity: Negative for ulcer, blister, skin breakdown, erythema, warmth, callus or dry skin.   Lymphadenopathy:     She has no cervical adenopathy.   Neurological: She is alert and oriented to person, place, and time. She has normal reflexes. No cranial nerve deficit. She exhibits normal muscle tone. Coordination normal.   Skin: Skin is warm and dry. No rash noted. She is not diaphoretic. No erythema. No pallor.   Psychiatric: She has a normal mood and affect. Her behavior is normal. Judgment and thought content normal.       Assessment:       1. Annual physical exam    2. Other conjunctivitis of right eye    3. Type 2 diabetes mellitus with hyperglycemia, without long-term current use of insulin    4. Moderate episode of recurrent major depressive disorder    5. Tinnitus of left ear    6. Need for hepatitis C screening test    7. Screen for colon cancer    8. Asymptomatic menopausal state    9. Essential hypertension    10. Urinary incontinence, unspecified type    11. Gastroesophageal reflux disease, esophagitis presence not specified    12. Dyslipidemia    13.  Type 2 diabetes mellitus with diabetic autonomic neuropathy, without long-term current use of insulin        Plan:         Amanda was seen today for tinnitus, annual exam and facial swelling.    Diagnoses and all orders for this visit:    Annual physical exam  -     TSH; Future  -     Comprehensive metabolic panel; Future  -     Lipid panel; Future  -     CBC auto differential; Future    Other conjunctivitis of right eye  -     neomycin-polymyxin-dexamethasone (MAXITROL) 3.5mg/mL-10,000 unit/mL-0.1 % DrpS; Place 1 drop into both eyes every 6 (six) hours.    Type 2 diabetes mellitus with hyperglycemia, without long-term current use of insulin  -     Comprehensive metabolic panel; Future  -     Lipid panel; Future  -     Hemoglobin A1c; Future  -     Microalbumin/creatinine urine ratio  -     Diabetic Eye Screening Photo; Future  -     metFORMIN (GLUCOPHAGE) 500 MG tablet; Take 1 tablet (500 mg total) by mouth 2 (two) times daily with meals.    Moderate episode of recurrent major depressive disorder  -     TSH; Future  -     Comprehensive metabolic panel; Future  -     CBC auto differential; Future    Tinnitus of left ear  -     Ambulatory referral to ENT    Need for hepatitis C screening test  -     Hepatitis C antibody; Future    Screen for colon cancer  -     Fecal Immunochemical Test (iFOBT); Future  -     Case request GI: COLONOSCOPY    Asymptomatic menopausal state  -     DXA Bone Density Spine And Hip; Future    Essential hypertension  -     amLODIPine (NORVASC) 5 MG tablet; Take 1 tablet (5 mg total) by mouth once daily.  -     enalapril (VASOTEC) 10 MG tablet; Take 1 tablet (10 mg total) by mouth once daily.  -     hydroCHLOROthiazide (HYDRODIURIL) 25 MG tablet; Take 1 tablet (25 mg total) by mouth once daily.    Urinary incontinence, unspecified type  -     fesoterodine (TOVIAZ) 4 mg Tb24; Take 1 tablet (4 mg total) by mouth once daily.    Gastroesophageal reflux disease, esophagitis presence not  specified  -     omeprazole (PRILOSEC) 20 MG capsule; Take 1 capsule (20 mg total) by mouth before breakfast.    Dyslipidemia  -     lovastatin (MEVACOR) 20 MG tablet; Take 1 tablet (20 mg total) by mouth every evening.    Type 2 diabetes mellitus with diabetic autonomic neuropathy, without long-term current use of insulin  -     metFORMIN (GLUCOPHAGE) 500 MG tablet; Take 1 tablet (500 mg total) by mouth 2 (two) times daily with meals.    Continue monitoring blood sugar at home,ADA diet.  Continue monitoring blood pressure at home, low sodium diet.

## 2018-06-26 LAB — HCV AB SERPL QL IA: NEGATIVE

## 2018-06-28 ENCOUNTER — TELEPHONE (OUTPATIENT)
Dept: OTOLARYNGOLOGY | Facility: CLINIC | Age: 68
End: 2018-06-28

## 2018-06-28 NOTE — TELEPHONE ENCOUNTER
----- Message from Alisia Rice sent at 6/28/2018 12:16 PM CDT -----  Contact: self/ [phone]  Patient has a referral for Tinnitus of left ear from Dr. Neftali Diaz; [place] leaving out of [place]18 she would like to know if she can be seen before August?

## 2018-06-28 NOTE — TELEPHONE ENCOUNTER
Spoke with patient's daughter, she is wanting a sooner appointment than August patient is leaving to go back to [place]. Offered appointment on 7/25/18 at 8:00 with audiologist and Dr Nugent to follow. Patient's daughter scheduled for this date and time

## 2018-06-29 ENCOUNTER — HOSPITAL ENCOUNTER (OUTPATIENT)
Dept: RADIOLOGY | Facility: HOSPITAL | Age: 68
Discharge: HOME | End: 2018-06-29
Attending: FAMILY MEDICINE
Payer: MEDICARE

## 2018-06-29 DIAGNOSIS — Z78.0 ASYMPTOMATIC MENOPAUSAL STATE: ICD-10-CM

## 2018-06-29 PROCEDURE — 77080 DXA BONE DENSITY AXIAL: CPT | Mod: TC

## 2018-06-29 PROCEDURE — 77080 DXA BONE DENSITY AXIAL: CPT | Mod: 26,,, | Performed by: RADIOLOGY

## 2018-07-11 ENCOUNTER — OFFICE VISIT (OUTPATIENT)
Dept: OPTOMETRY | Facility: CLINIC | Age: 68
End: 2018-07-11
Payer: MEDICARE

## 2018-07-11 DIAGNOSIS — Z96.1 PSEUDOPHAKIA OF BOTH EYES: ICD-10-CM

## 2018-07-11 DIAGNOSIS — H10.13 ALLERGIC CONJUNCTIVITIS, BILATERAL: Primary | ICD-10-CM

## 2018-07-11 DIAGNOSIS — E11.3293 MILD NONPROLIFERATIVE DIABETIC RETINOPATHY OF BOTH EYES WITHOUT MACULAR EDEMA ASSOCIATED WITH TYPE 2 DIABETES MELLITUS: ICD-10-CM

## 2018-07-11 PROCEDURE — 92014 COMPRE OPH EXAM EST PT 1/>: CPT | Mod: S$GLB,,, | Performed by: OPTOMETRIST

## 2018-07-11 PROCEDURE — 99999 PR PBB SHADOW E&M-EST. PATIENT-LVL I: CPT | Mod: PBBFAC,,, | Performed by: OPTOMETRIST

## 2018-07-11 RX ORDER — FLUOROMETHOLONE 1 MG/ML
1 SUSPENSION/ DROPS OPHTHALMIC 3 TIMES DAILY
Qty: 5 ML | Refills: 0 | Status: SHIPPED | OUTPATIENT
Start: 2018-07-11 | End: 2018-07-18

## 2018-07-11 NOTE — LETTER
July 11, 2018      Neftali Peters MD  [address] [facility] [place] [address]           [facility]  [address] [facility]  [place] [address]  Phone: [phone]  Fax: [phone]          Patient: Amanda Reyes   MR Number: [id number]   YOB: 1950   Date of Visit: 7/11/2018       Dear Dr. Neftali Peters:    Thank you for referring Amanda Reyes to me for evaluation. Attached you will find relevant portions of my assessment and plan of care.    If you have questions, please do not hesitate to call me. I look forward to following Amanda Reyes along with you.    Sincerely,    Nate Alexandre, OD    Enclosure  CC:  No Recipients    If you would like to receive this communication electronically, please contact [email or web address] or [phone] to request more information on [facility] access.    For providers and/or their staff who would like to refer a patient to [facility], please contact us through our one-stop-shop provider referral line, [facility] , at [phone].    If you feel you have received this communication in error or would no longer like to receive these types of communications, please e-mail [email or web address]

## 2018-07-11 NOTE — PROGRESS NOTES
HPI     Both eyes now itchy  Used maxitrol 1 days ago x 1 week  Only used emycin ointment for short time    Last edited by Nate Alexandre, OD on 7/11/2018  2:00 PM. (History)            Assessment /Plan     For exam results, see Encounter Report.    Allergic conjunctivitis, bilateral  -     fluorometholone 0.1% (FML) 0.1 % DrpS; Place 1 drop into both eyes 3 (three) times daily. for 7 days  Dispense: 5 mL; Refill: 0    Mild nonproliferative diabetic retinopathy of both eyes without macular edema associated with type 2 diabetes mellitus    Pseudophakia of both eyes      1. Some itchy and swollen eyes. Stop current drops and start FML 1 drop 3x/day x 1 week. RTC if no resolution.  2. Mild diabetic retinopathy, no csme. Return in 1 year for dilated eye exam.  3. Monitor condition. Patient to report any changes. RTC 1 year recheck.

## 2018-07-25 ENCOUNTER — PATIENT MESSAGE (OUTPATIENT)
Dept: ADMINISTRATIVE | Facility: HOSPITAL | Age: 68
End: 2018-07-25

## 2018-07-31 ENCOUNTER — TELEPHONE (OUTPATIENT)
Dept: GASTROENTEROLOGY | Facility: CLINIC | Age: 68
End: 2018-07-31

## 2018-07-31 NOTE — TELEPHONE ENCOUNTER
Referral was sent from Dr. Marroquin to schedule an Colonoscopy, left an voicemail for patient to call the office back in regards to scheduling procedure.

## 2018-08-02 ENCOUNTER — PATIENT MESSAGE (OUTPATIENT)
Dept: FAMILY MEDICINE | Facility: CLINIC | Age: 68
End: 2018-08-02

## 2018-08-02 DIAGNOSIS — E11.65 TYPE 2 DIABETES MELLITUS WITH HYPERGLYCEMIA, WITHOUT LONG-TERM CURRENT USE OF INSULIN: ICD-10-CM

## 2018-08-02 DIAGNOSIS — E11.43 TYPE 2 DIABETES MELLITUS WITH DIABETIC AUTONOMIC NEUROPATHY, WITHOUT LONG-TERM CURRENT USE OF INSULIN: ICD-10-CM

## 2018-08-02 RX ORDER — METFORMIN HYDROCHLORIDE 500 MG/1
500 TABLET ORAL 2 TIMES DAILY WITH MEALS
Qty: 60 TABLET | Refills: 0 | Status: SHIPPED | OUTPATIENT
Start: 2018-08-02 | End: 2019-07-09

## 2018-08-02 NOTE — TELEPHONE ENCOUNTER
Colonoscopy Referral  Referring Physician: Dr. Diaz  Date: 2/16/17  Reason for Referral: Screening  Family History of:   Colon polyp: No  Relationship/Age of Onset:   Colon cancer: No  Relationship/Age of Onset:   Patient with:   Hemoccults Done: No  Iron deficient: No  On Blood Thinner: No  Valvular heart disease/valve replacement: No  Anemia Present: No  On NSAID: No  Lung disease: No  Kidney disease: Yes, One Kidney   Hx of polyps: No  Hx of colon cancer: No  Previous colon evalations: No   None  When:   Where:   Pertinent symptoms:   Current Outpatient Prescriptions:  loperamide (IMODIUM) 1 mg/5 mL solution, Take by mouth every 6 (six) hours as needed for Diarrhea., Disp: , Rfl:   No current facility-administered medications for this visit.   ?  Review of patient's allergies indicates:  No Known Allergies  Patient was scheduled for colonoscopy on 9/4/18 with Dr. Brothers at [facility]. Suprep instructions were reviewed with patient.

## 2018-08-02 NOTE — TELEPHONE ENCOUNTER
----- Message from Shira Cantu sent at 8/2/2018  2:50 PM CDT -----  Contact: Gabriela (daughter)/[phone]  Patient is requesting a refill on her medication.  I do not see what she is wanting in her chart.    Please call and advise.

## 2018-08-02 NOTE — TELEPHONE ENCOUNTER
----- Message from Shira Cantu sent at 8/2/2018  2:50 PM CDT -----  Contact: Gabriela (daughter)/[phone]  Patient is requesting a refill on her medication.  I do not see what she is wanting in her chart.    Please call and advise.

## 2018-08-03 ENCOUNTER — TELEPHONE (OUTPATIENT)
Dept: FAMILY MEDICINE | Facility: CLINIC | Age: 68
End: 2018-08-03

## 2018-08-03 NOTE — TELEPHONE ENCOUNTER
Spoke to patients daughter and I told her medication was ready at the pharmacy. Patient daughter voices understanding.

## 2018-08-03 NOTE — TELEPHONE ENCOUNTER
----- Message from Imelda Velarde sent at 8/3/2018  1:31 PM CDT -----  Contact: [phone]/daughter/Gabriela  Patient's daughter is requesting a call back. The rx for metFORMIN (GLUCOPHAGE) 500 MG tablets is not what she is requesting. She is leaving the country today and needs her meds. Please advise.

## 2018-08-31 ENCOUNTER — TELEPHONE (OUTPATIENT)
Dept: ENDOSCOPY | Facility: HOSPITAL | Age: 68
End: 2018-08-31

## 2018-09-03 ENCOUNTER — PATIENT MESSAGE (OUTPATIENT)
Dept: ENDOSCOPY | Facility: HOSPITAL | Age: 68
End: 2018-09-03

## 2018-09-04 ENCOUNTER — HOSPITAL ENCOUNTER (OUTPATIENT)
Facility: HOSPITAL | Age: 68
Discharge: HOME | End: 2018-09-04
Attending: INTERNAL MEDICINE | Admitting: INTERNAL MEDICINE
Payer: MEDICARE

## 2018-09-04 ENCOUNTER — TELEPHONE (OUTPATIENT)
Dept: ENDOSCOPY | Facility: HOSPITAL | Age: 68
End: 2018-09-04

## 2018-09-04 ENCOUNTER — ANESTHESIA (OUTPATIENT)
Dept: ENDOSCOPY | Facility: HOSPITAL | Age: 68
End: 2018-09-04
Payer: MEDICARE

## 2018-09-04 ENCOUNTER — ANESTHESIA EVENT (OUTPATIENT)
Dept: ENDOSCOPY | Facility: HOSPITAL | Age: 68
End: 2018-09-04
Payer: MEDICARE

## 2018-09-04 DIAGNOSIS — Z12.11 SCREENING FOR MALIGNANT NEOPLASM OF COLON: ICD-10-CM

## 2018-09-04 PROCEDURE — 82962 GLUCOSE BLOOD TEST: CPT | Performed by: INTERNAL MEDICINE

## 2018-09-04 PROCEDURE — 37000009 HC ANESTHESIA EA ADD 15 MINS: Performed by: INTERNAL MEDICINE

## 2018-09-04 PROCEDURE — G0121 COLON CA SCRN NOT HI RSK IND: HCPCS | Mod: ,,, | Performed by: INTERNAL MEDICINE

## 2018-09-04 PROCEDURE — 63600175 PHARM REV CODE 636 W HCPCS: Performed by: NURSE ANESTHETIST, CERTIFIED REGISTERED

## 2018-09-04 PROCEDURE — 25000003 PHARM REV CODE 250: Performed by: INTERNAL MEDICINE

## 2018-09-04 PROCEDURE — G0105 COLORECTAL SCRN; HI RISK IND: HCPCS | Performed by: INTERNAL MEDICINE

## 2018-09-04 PROCEDURE — 37000008 HC ANESTHESIA 1ST 15 MINUTES: Performed by: INTERNAL MEDICINE

## 2018-09-04 PROCEDURE — G0121 COLON CA SCRN NOT HI RSK IND: HCPCS | Performed by: INTERNAL MEDICINE

## 2018-09-04 RX ORDER — SODIUM CHLORIDE 0.9 % (FLUSH) 0.9 %
3 SYRINGE (ML) INJECTION
Status: DISCONTINUED | OUTPATIENT
Start: 2018-09-04 | End: 2021-06-14

## 2018-09-04 RX ORDER — SODIUM CHLORIDE 9 MG/ML
INJECTION, SOLUTION INTRAVENOUS CONTINUOUS
Status: DISCONTINUED | OUTPATIENT
Start: 2018-09-04 | End: 2021-06-14

## 2018-09-04 RX ORDER — PROPOFOL 10 MG/ML
VIAL (ML) INTRAVENOUS CONTINUOUS PRN
Status: DISCONTINUED | OUTPATIENT
Start: 2018-09-04 | End: 2018-09-04

## 2018-09-04 RX ORDER — PROPOFOL 10 MG/ML
VIAL (ML) INTRAVENOUS
Status: DISCONTINUED | OUTPATIENT
Start: 2018-09-04 | End: 2018-09-04

## 2018-09-04 RX ORDER — LIDOCAINE HCL/PF 100 MG/5ML
SYRINGE (ML) INTRAVENOUS
Status: DISCONTINUED | OUTPATIENT
Start: 2018-09-04 | End: 2018-09-04

## 2018-09-04 RX ADMIN — SODIUM CHLORIDE: 0.9 INJECTION, SOLUTION INTRAVENOUS at 10:09

## 2018-09-04 RX ADMIN — PROPOFOL 150 MCG/KG/MIN: 10 INJECTION, EMULSION INTRAVENOUS at 10:09

## 2018-09-04 RX ADMIN — LIDOCAINE HYDROCHLORIDE 100 MG: 20 INJECTION, SOLUTION INTRAVENOUS at 10:09

## 2018-09-04 RX ADMIN — PROPOFOL 50 MG: 10 INJECTION, EMULSION INTRAVENOUS at 10:09

## 2018-09-04 NOTE — TRANSFER OF CARE
"Anesthesia Transfer of Care Note    Patient: Amanda Reyes    Procedure(s) Performed: Procedure(s) (LRB):  COLONOSCOPY Suprep (N/A)    Patient location: GI    Anesthesia Type: MAC    Transport from OR: Transported from OR on room air with adequate spontaneous ventilation    Post pain: adequate analgesia    Post assessment: no apparent anesthetic complications and tolerated procedure well    Post vital signs: stable    Level of consciousness: awake, alert and oriented    Nausea/Vomiting: no nausea/vomiting    Complications: none    Transfer of care protocol was followed      Last vitals:   Visit Vitals  /67 (BP Location: Left arm, Patient Position: Lying)   Pulse 73   Temp 36.8 °C (98.2 °F) (Temporal)   Resp 20   Ht 5' 7" (1.702 m)   Wt 73.5 kg (162 lb)   SpO2 98%   Breastfeeding? No   BMI 25.37 kg/m²     "

## 2018-09-04 NOTE — PLAN OF CARE
Pt spoke to Dr. Brothers with daughter interpreting per pt request. Verbal and written instructions given and pt w/c to car with Kwaku in no acute distress.

## 2018-09-04 NOTE — [employer]ATION PATIENT INSTRUCTIONS
Discharge Summary/Instructions after an Endoscopic Procedure  Patient Name: Amanda Reyes  Patient MRN: [id number]  Patient YOB: 1950 Tuesday, September 04, 2018  Lilly Brothers MD  RESTRICTIONS:  During your procedure today, you received medications for sedation.  These   medications may affect your judgment, balance and coordination.  Therefore,   for 24 hours, you have the following restrictions:   - DO NOT drive a car, operate machinery, make legal/financial decisions,   sign important papers or drink alcohol.    ACTIVITY:  Today: no heavy lifting, straining or running due to procedural   sedation/anesthesia.  The following day: return to full activity including work.  DIET:  Eat and drink normally unless instructed otherwise.     TREATMENT FOR COMMON SIDE EFFECTS:  - Mild abdominal pain, nausea, belching, bloating or excessive gas:  rest,   eat lightly and use a heating pad.  - Sore Throat: treat with throat lozenges and/or gargle with warm salt   water.  - Because air was used during the procedure, expelling large amounts of air   from your rectum or belching is normal.  - If a bowel prep was taken, you may not have a bowel movement for 1-3 days.    This is normal.  SYMPTOMS TO WATCH FOR AND REPORT TO YOUR PHYSICIAN:  1. Abdominal pain or bloating, other than gas cramps.  2. Chest pain.  3. Back pain.  4. Signs of infection such as: chills or fever occurring within 24 hours   after the procedure.  5. Rectal bleeding, which would show as bright red, maroon, or black stools.   (A tablespoon of blood from the rectum is not serious, especially if   hemorrhoids are present.)  6. Vomiting.  7. Weakness or dizziness.  GO DIRECTLY TO THE NEAREST EMERGENCY ROOM IF YOU HAVE ANY OF THE FOLLOWING:      Difficulty breathing              Chills and/or fever over 101 F   Persistent vomiting and/or vomiting blood   Severe abdominal pain   Severe chest pain   Black, tarry stools   Bleeding- more than one  tablespoon   Any other symptom or condition that you feel may need urgent attention  Your doctor recommends these additional instructions:  If any biopsies were taken, your doctors clinic will contact you in 1 to 2   weeks with any results.  - Discharge patient to home (via wheelchair).   - Patient has a contact number available for emergencies.  The signs and   symptoms of potential delayed complications were discussed with the   patient.  Return to normal activities tomorrow.  Written discharge   instructions were provided to the patient.   - Resume previous diet.   - Continue present medications.   - Repeat colonoscopy in 10 years for screening purposes.  For questions, problems or results please call your physician - Lilly Brothers MD at Work:  [phone].  EMERGENCY PHONE NUMBER: [phone],  LAB RESULTS: [phone]  IF A COMPLICATION OR EMERGENCY SITUATION ARISES AND YOU ARE UNABLE TO REACH   YOUR PHYSICIAN - GO DIRECTLY TO THE EMERGENCY ROOM.  Lilly Brothers MD  9/4/2018 11:36:27 AM  This report has been verified and signed electronically.  [employer]

## 2018-09-04 NOTE — ANESTHESIA POSTPROCEDURE EVALUATION
"Anesthesia Post Evaluation    Patient: Amanda Reyes    Procedure(s) Performed: Procedure(s) (LRB):  COLONOSCOPY Suprep (N/A)    Final Anesthesia Type: MAC  Patient location during evaluation: GI PACU  Patient participation: Yes- Able to Participate  Level of consciousness: awake and alert and oriented  Post-procedure vital signs: reviewed and stable  Pain management: adequate  Airway patency: patent  PONV status at discharge: No PONV  Anesthetic complications: no      Cardiovascular status: blood pressure returned to baseline, hemodynamically stable and stable  Respiratory status: unassisted, spontaneous ventilation and room air  Hydration status: euvolemic  Follow-up not needed.        Visit Vitals  /67 (BP Location: Left arm, Patient Position: Lying)   Pulse 73   Temp 36.8 °C (98.2 °F) (Temporal)   Resp 20   Ht 5' 7" (1.702 m)   Wt 73.5 kg (162 lb)   SpO2 98%   Breastfeeding? No   BMI 25.37 kg/m²       Pain/Benton Score: Pain Assessment Performed: Yes (9/4/2018 10:13 AM)  Presence of Pain: denies (9/4/2018 10:13 AM)        "

## 2018-09-04 NOTE — ANESTHESIA PREPROCEDURE EVALUATION
09/04/2018     Amanda Reyes is a 68 y.o., female here for C-Scope    Past Medical History:   Diagnosis Date    Abnormal Pap smear     Anxiety     Arthritis     Cataract     Cataract of left eye     Diabetes     Diabetes mellitus, type 2     Eye problem     GERD (gastroesophageal reflux disease)     Head and face pain     Hypertension      Past Surgical History:   Procedure Laterality Date    CATARACT EXTRACTION W/  INTRAOCULAR LENS IMPLANT Left 5/12/2015    Dr. Fermin    CATARACT EXTRACTION W/  INTRAOCULAR LENS IMPLANT Right 03/21/2017    Dr. Fermin    HYSTERECTOMY      JOINT REPLACEMENT      bilateral knees    KIDNEY SURGERY      right nephrectomy    TUBAL LIGATION           Anesthesia Evaluation    I have reviewed the Patient Summary Reports.    I have reviewed the Nursing Notes.   I have reviewed the Medications.     Review of Systems  Anesthesia Hx:  History of prior surgery of interest to airway management or planning:   Hematology/Oncology:  Hematology Normal        Cardiovascular:   Hypertension    Pulmonary:  Pulmonary Normal    Hepatic/GI:   GERD    Musculoskeletal:   Arthritis     Neurological:   Headaches    Endocrine:   Diabetes (HbA1c 7.6)    Psych:   depression          Physical Exam  General:  Well nourished    Airway/Jaw/Neck:  AIRWAY FINDINGS: Normal    Eyes/Ears/Nose:  EYES/EARS/NOSE FINDINGS: Normal    Chest/Lungs:  Chest/Lungs Clear    Heart/Vascular:  Heart Findings: Normal       Mental Status:  Mental Status Findings: Normal        Anesthesia Plan  Type of Anesthesia, risks & benefits discussed:  Anesthesia Type:  general, MAC  Patient's Preference:   Intra-op Monitoring Plan:   Intra-op Monitoring Plan Comments:   Post Op Pain Control Plan:   Post Op Pain Control Plan Comments:   Induction:   IV  Beta Blocker:         Informed Consent: Patient  understands risks and agrees with Anesthesia plan.  Questions answered. Anesthesia consent signed with patient.  ASA Score: 2     Day of Surgery Review of History & Physical:            Ready For Surgery From Anesthesia Perspective.

## 2018-09-04 NOTE — H&P
[facility]  Gastroenterology  H&P    Patient Name: Amanda Reyes  MRN: [id number]  Admission Date: 9/4/2018  Code Status: Full Code    Attending Provider: Lilly Brothers MD   Primary Care Physician: Neftali Peters MD  Principal Problem:<principal problem not specified>    Subjective:     History of Present Illness: Colon cancer screening    Past Medical History:   Diagnosis Date    Abnormal Pap smear     Anxiety     Arthritis     Cataract     Cataract of left eye     Diabetes     Diabetes mellitus, type 2     Eye problem     GERD (gastroesophageal reflux disease)     Head and face pain     Hypertension        Past Surgical History:   Procedure Laterality Date    CATARACT EXTRACTION W/  INTRAOCULAR LENS IMPLANT Left 5/12/2015    Dr. Fermin    CATARACT EXTRACTION W/  INTRAOCULAR LENS IMPLANT Right 03/21/2017    Dr. Fermin    HYSTERECTOMY      JOINT REPLACEMENT      bilateral knees    KIDNEY SURGERY      right nephrectomy    TUBAL LIGATION         Review of patient's allergies indicates:  No Known Allergies  Family History     Problem Relation (Age of Onset)    Cataracts Father    Hypertension Mother, Father    No Known Problems Maternal Grandmother, Maternal Grandfather, Paternal Grandmother, Paternal Grandfather, Sister, Brother, Maternal Aunt, Maternal Uncle, Paternal Aunt, Paternal Uncle        Tobacco Use    Smoking status: Never Smoker    Smokeless tobacco: Never Used   Substance and Sexual Activity    Alcohol use: No    Drug use: No    Sexual activity: No     Review of Systems   Constitutional: Negative for appetite change and unexpected weight change.   Respiratory: Negative for choking and wheezing.    Cardiovascular: Negative for chest pain and palpitations.   Gastrointestinal: Negative for abdominal distention and abdominal pain.     Objective:     Vital Signs (Most Recent):  Temp: 98.2 °F (36.8 °C) (09/04/18 1013)  Pulse: 73 (09/04/18  1013)  Resp: 20 (09/04/18 1013)  BP: 119/67 (09/04/18 1013)  SpO2: 98 % (09/04/18 1013) Vital Signs (24h Range):  Temp:  [98.2 °F (36.8 °C)] 98.2 °F (36.8 °C)  Pulse:  [73] 73  Resp:  [20] 20  SpO2:  [98 %] 98 %  BP: (119)/(67) 119/67     Weight: 73.5 kg (162 lb) (09/04/18 1013)  Body mass index is 25.37 kg/m².    No intake or output data in the 24 hours ending 09/04/18 1037    Lines/Drains/Airways     Peripheral Intravenous Line                 Peripheral IV - Single Lumen 09/04/18 1017 Right Hand less than 1 day                Physical Exam   Constitutional: She is oriented to person, place, and time. She appears well-developed and well-nourished. No distress.   HENT:   Head: Normocephalic and atraumatic.   Eyes: Conjunctivae are normal. No scleral icterus.   Neck: Normal range of motion. Neck supple. No tracheal deviation present. No thyromegaly present.   Cardiovascular: Normal rate and regular rhythm. Exam reveals no gallop and no friction rub.   No murmur heard.  Pulmonary/Chest: Effort normal and breath sounds normal. No respiratory distress. She has no wheezes.   Abdominal: Soft. Bowel sounds are normal. She exhibits no distension. There is no tenderness.   Musculoskeletal:        Right wrist: She exhibits normal range of motion and no tenderness.        Left wrist: She exhibits normal range of motion and no tenderness.   Lymphadenopathy:        Head (right side): No submental and no submandibular adenopathy present.        Head (left side): No submental and no submandibular adenopathy present.   Neurological: She is alert and oriented to person, place, and time.   Skin: Skin is warm and dry. No rash noted. She is not diaphoretic. No erythema.   Psychiatric: She has a normal mood and affect. Her behavior is normal.   Nursing note and vitals reviewed.        Assessment/Plan:     Active Diagnoses:    Diagnosis Date Noted POA    Screening for malignant neoplasm of colon [Z12.11] 09/04/2018 Not Applicable       Problems Resolved During this Admission:     Plan  1. Colonoscopy ,risks/benefits explained to pt and family  in detail    Lilly Brothers MD  Gastroenterology  [facility]

## 2018-09-06 VITALS
HEIGHT: 67 IN | DIASTOLIC BLOOD PRESSURE: 62 MMHG | OXYGEN SATURATION: 96 % | WEIGHT: 162 LBS | RESPIRATION RATE: 23 BRPM | SYSTOLIC BLOOD PRESSURE: 115 MMHG | BODY MASS INDEX: 25.43 KG/M2 | HEART RATE: 69 BPM | TEMPERATURE: 99 F

## 2018-10-31 ENCOUNTER — PES CALL (OUTPATIENT)
Dept: ADMINISTRATIVE | Facility: CLINIC | Age: 68
End: 2018-10-31

## 2019-01-11 DIAGNOSIS — E11.9 TYPE 2 DIABETES MELLITUS WITHOUT COMPLICATION: ICD-10-CM

## 2019-02-28 DIAGNOSIS — Z12.39 BREAST CANCER SCREENING: ICD-10-CM

## 2019-06-26 ENCOUNTER — PATIENT MESSAGE (OUTPATIENT)
Dept: FAMILY MEDICINE | Facility: CLINIC | Age: 69
End: 2019-06-26

## 2019-06-28 ENCOUNTER — OFFICE VISIT (OUTPATIENT)
Dept: URGENT CARE | Facility: CLINIC | Age: 69
End: 2019-06-28
Payer: MEDICARE

## 2019-06-28 VITALS
HEIGHT: 67 IN | RESPIRATION RATE: 18 BRPM | HEART RATE: 72 BPM | DIASTOLIC BLOOD PRESSURE: 85 MMHG | TEMPERATURE: 98 F | OXYGEN SATURATION: 96 % | SYSTOLIC BLOOD PRESSURE: 154 MMHG | WEIGHT: 170 LBS | BODY MASS INDEX: 26.68 KG/M2

## 2019-06-28 DIAGNOSIS — L23.9 ALLERGIC DERMATITIS: ICD-10-CM

## 2019-06-28 DIAGNOSIS — R21 RASH: Primary | ICD-10-CM

## 2019-06-28 DIAGNOSIS — R73.09 ABNORMAL GLUCOSE: ICD-10-CM

## 2019-06-28 LAB — GLUCOSE SERPL-MCNC: 322 MG/DL (ref 70–110)

## 2019-06-28 PROCEDURE — 82962 GLUCOSE BLOOD TEST: CPT | Mod: S$GLB,,, | Performed by: PHYSICIAN ASSISTANT

## 2019-06-28 PROCEDURE — 96372 PR INJECTION,THERAP/PROPH/DIAG2ST, IM OR SUBCUT: ICD-10-PCS | Mod: S$GLB,,, | Performed by: PHYSICIAN ASSISTANT

## 2019-06-28 PROCEDURE — 96372 THER/PROPH/DIAG INJ SC/IM: CPT | Mod: S$GLB,,, | Performed by: PHYSICIAN ASSISTANT

## 2019-06-28 PROCEDURE — 3079F DIAST BP 80-89 MM HG: CPT | Mod: CPTII,S$GLB,, | Performed by: PHYSICIAN ASSISTANT

## 2019-06-28 PROCEDURE — 3077F SYST BP >= 140 MM HG: CPT | Mod: CPTII,S$GLB,, | Performed by: PHYSICIAN ASSISTANT

## 2019-06-28 PROCEDURE — 82962 POCT GLUCOSE, HAND-HELD DEVICE: ICD-10-PCS | Mod: S$GLB,,, | Performed by: PHYSICIAN ASSISTANT

## 2019-06-28 PROCEDURE — 3079F PR MOST RECENT DIASTOLIC BLOOD PRESSURE 80-89 MM HG: ICD-10-PCS | Mod: CPTII,S$GLB,, | Performed by: PHYSICIAN ASSISTANT

## 2019-06-28 PROCEDURE — 99214 PR OFFICE/OUTPT VISIT, EST, LEVL IV, 30-39 MIN: ICD-10-PCS | Mod: 25,S$GLB,, | Performed by: PHYSICIAN ASSISTANT

## 2019-06-28 PROCEDURE — 3077F PR MOST RECENT SYSTOLIC BLOOD PRESSURE >= 140 MM HG: ICD-10-PCS | Mod: CPTII,S$GLB,, | Performed by: PHYSICIAN ASSISTANT

## 2019-06-28 PROCEDURE — 99214 OFFICE O/P EST MOD 30 MIN: CPT | Mod: 25,S$GLB,, | Performed by: PHYSICIAN ASSISTANT

## 2019-06-28 RX ORDER — NEOMYCIN SULFATE, POLYMYXIN B SULFATE AND DEXAMETHASONE 3.5; 10000; 1 MG/ML; [USP'U]/ML; MG/ML
1 SUSPENSION/ DROPS OPHTHALMIC EVERY 6 HOURS
Qty: 5 ML | Refills: 0 | Status: SHIPPED | OUTPATIENT
Start: 2019-06-28 | End: 2019-07-03

## 2019-06-28 RX ORDER — TRIAMCINOLONE ACETONIDE 1 MG/G
CREAM TOPICAL
Qty: 1 TUBE | Refills: 0 | Status: SHIPPED | OUTPATIENT
Start: 2019-06-28 | End: 2021-06-14 | Stop reason: SDUPTHER

## 2019-06-28 RX ORDER — HYDROXYZINE HYDROCHLORIDE 25 MG/1
25 TABLET, FILM COATED ORAL 3 TIMES DAILY PRN
Qty: 30 TABLET | Refills: 0 | Status: SHIPPED | OUTPATIENT
Start: 2019-06-28 | End: 2021-06-14 | Stop reason: SDUPTHER

## 2019-06-28 RX ORDER — DEXAMETHASONE SODIUM PHOSPHATE 100 MG/10ML
6 INJECTION INTRAMUSCULAR; INTRAVENOUS
Status: COMPLETED | OUTPATIENT
Start: 2019-06-28 | End: 2019-06-28

## 2019-06-28 RX ADMIN — DEXAMETHASONE SODIUM PHOSPHATE 6 MG: 100 INJECTION INTRAMUSCULAR; INTRAVENOUS at 08:06

## 2019-06-29 NOTE — PATIENT INSTRUCTIONS
You must understand that you've received an Urgent Care treatment only and that you may be released before all your medical problems are known or treated. You, the patient, will arrange for follow up care as instructed.  Follow up with your PCP or specialty clinic as directed if not improved or as needed. You can call [phone] to schedule an appointment with the appropriate provider.  If your condition worsens we recommend that you receive another evaluation at the Emergency Department for any concerns or worsening of condition.  Patient aware and verbalized understanding.    You received a steroid shot today - this can elevate your blood pressure, elevate your blood sugar, water weight gain, nervous energy, redness to the face and dimpling of the skin where the shot goes in.   Patient aware, verbalized understanding and agreed with plan of care.    Drink plenty of fluids and get lots of rest.  Avoid picking/manipulating the wound.   Keep dry and clean after shower/bath and then apply ointment to area of concern.  Cover during the day to avoid friction constantly rubbing up against the area of irritation.  Eye drops RX as prescribed.  Atarax RX as prescribed for itching - will cause drowsiness.  Follow-up with your PCP for further evaluation as needed.  You should seek ER treatment if fever, increase pain, swelling, red streaks from affected area or other signs of increasing infection.   ER precautions given to patient.  Patient aware and verbalized understanding.    Cuidado personal para erupciones cutáneas    Sade erupción cutánea es sade reacción de la piel a sade sustancia a la cual el cuerpo es sensible. La mayoría de las erupciones cutáneas pueden tratarse en casa manteniendo la piel limpia y seca. Muchas erupciones se autolimitan y pueden resolverse al cabo de dos o pari días. Las erupciones que pican, supuran o duelen pueden requerir atención médica, en especial, si el salpullido va empeorando.  Causas  comunes de las erupciones cutáneas  · Insolación, causada por agueda exposición excesiva al sol.  · Agueda reacción alérgica a un alimento, a agueda planta o a un producto químico. Por ejemplo, los camarones, la hiedra venenosa o los productos de limpieza.  · Agueda infección causada por un hongo (prerna la tiña), un virus (prerna la varicela) o agueda bacteria (prerna el estreptococo).  · Mordeduras o infestaciones debidas a insectos o plagas prerna garrapatas, piojos o ácaros.  · Piel seca, que suele verse cassandra los meses de invierno y en la gente mayor.  Control de la picazón y del daño en la piel  · [employer] jack suavizantes. Pruebe a colocar agueda taza de valery cocida en agueda lyly con agua tibia. El agua que se evapora está enfriando la piel.  · Anupam lo posible por no rascarse. Recorte las uñas; en especial, de los niños pequeños, para reducir el daño en la piel si no mathew de rascarse.  · Use agueda loción humectante para la piel en lugar de rascarse la piel seca.  · Use protector solar cada vez que vaya a exponerse a la hari directa del sol.  Use solo agentes limpiadores suaves siempre que sea posible.  · Lávese con un jabón suave no irritante y agua templada.  · Use ropa que permita el paso de aire, prerna camisas de algodón o zapatos de leelee.  · Si la erupción produce secreción de líquido, cúbrala con agueda gasa limpia, sin apretarla, para absorber la secreción.  · Muchas erupciones son contagiosas. Evite que el salpullido se propague a otras personas lavándose las janeth con frecuencia antes o después de tocar a alguien que tenga agueda erupción cutánea.  Use medicamentos  · Los antihistamínicos, prerna la difenhidramina, pueden ayudar a controlar la picazón en muchas erupciones.  · Agueda pomada con hidrocortisona en las erupciones pequeñas puede ayudar a reducir la hinchazón y el enrojecimiento.La mayoría de los medicamentos antifúngicos para tratar el pie de atleta y muchas otras infecciones de la piel causadas por hongos.  Consulte con yadav  proveedor si:  · Le dijeron que tiene agueda infección fúngica en la piel.  · Tiene preguntas o dudas sobre los efectos secundarios de un medicamento.   Llame al 911 si:  · Nota que la lengua o los labios comienzan a hincharse.  · Tiene dificultad para respirar.  Llame a yadav proveedor de atención médica si:  · Tiene agueda fiebre superior a 101.0°F (38.3°C)  · Tiene irritación de garganta, tos o cansancio inusual.  · Tiene agueda erupción que se vuelve cada vez más natalia, supurante o dolorosa (síntomas de infección).  · Tiene agueda erupción que le cubre la patty, los genitales y la mayor parte del cuerpo.  · Tiene ulceraciones con costra o anillos rojos que comienzan a extenderse.  · Tuvo contacto con alguien que tenía un salpullido contagioso, prerna sarna o piojos.  · Tiene agueda erupción natalia y redonda con un centro de color troy (esto es un síntoma de la enfermedad de Lyme).  · Le dijeron que tiene agueda bacteria resistente (MRSA, por niyah siglas en inglés) en la piel.   Date Last Reviewed: 5/12/2015  © 2854-4236 [employer] c3 creations. [address] [place] [address], [place] [address]. Todos los derechos reservados. Esta información no pretende sustituir la atención médica profesional. Sólo yadav médico puede diagnosticar y tratar un problema de jake.

## 2019-06-29 NOTE — PROGRESS NOTES
"Subjective:       Patient ID: Amanda Reyes is a 69 y.o. female.    Vitals:  height is 5' 7" (1.702 m) and weight is 77.1 kg (170 lb). Her temperature is 98.2 °F (36.8 °C). Her blood pressure is 154/85 (abnormal) and her pulse is 72. Her respiration is 18 and oxygen saturation is 96%.     Chief Complaint: Rash    Patient is with daughter on exam today. Daughter reports similar episode around the same exact time last year. Patient does not know why the rash has returned again this year, but has PCP appointment for next week. Patient reports NKA and denies trying any new soaps, detergents, food/drink etc. Patient describes the rash as very itchy. Patient currently denies fever, chills, body aches, CP, SOB, wheezing, abdominal pain, N/V/D/C, headache, blurry vision, dizziness or syncope.       Rash   This is a new problem. The current episode started in the past 7 days (6 days). The problem has been gradually worsening since onset. The affected locations include the chest, face, neck, right hand and left hand. The rash is characterized by redness and itchiness. She was exposed to nothing. Pertinent negatives include no anorexia, congestion, cough, diarrhea, eye pain, facial edema, fatigue, fever, joint pain, nail changes, rhinorrhea, shortness of breath, sore throat or vomiting. Past treatments include nothing. The treatment provided no relief.       Constitution: Negative for chills, sweating, fatigue and fever.   HENT: Negative for ear pain, facial swelling, congestion, sore throat and trouble swallowing.    Neck: Negative for neck pain, neck stiffness, painful lymph nodes and neck swelling.   Cardiovascular: Negative for chest pain, leg swelling, palpitations, sob on exertion and passing out.   Eyes: Negative for eye itching, eye pain, eye redness, photophobia, vision loss, double vision, blurred vision and eyelid swelling.   Respiratory: Negative for chest tightness, cough, sputum production, bloody " sputum, shortness of breath, stridor and wheezing.    Gastrointestinal: Negative for abdominal pain, abdominal bloating, nausea, vomiting, constipation, diarrhea and heartburn.   Musculoskeletal: Negative for joint pain, joint swelling, back pain, muscle cramps and muscle ache.   Skin: Positive for rash and erythema. Negative for color change, pale, wound, abrasion, laceration, lesion, skin thickening/induration, puncture wound, bruising, abscess, avulsion and hives.   Allergic/Immunologic: Positive for itching. Negative for environmental allergies, immunocompromised state, hives and sneezing.   Neurological: Negative for dizziness, light-headedness, passing out, loss of balance, headaches, altered mental status, loss of consciousness and seizures.   Hematologic/Lymphatic: Negative for swollen lymph nodes.   Psychiatric/Behavioral: Negative for altered mental status and nervous/anxious. The patient is not nervous/anxious.        Objective:      Physical Exam   Constitutional: She is oriented to person, place, and time. She appears well-developed and well-nourished. She is cooperative.  Non-toxic appearance. She does not appear ill. No distress.   Patient is stable, seated comfortably in NAD.   HENT:   Head: Normocephalic and atraumatic.   Right Ear: Hearing, tympanic membrane, external ear and ear canal normal.   Left Ear: Hearing, tympanic membrane, external ear and ear canal normal.   Nose: Nose normal. No mucosal edema, rhinorrhea or nasal deformity. No epistaxis. Right sinus exhibits no maxillary sinus tenderness and no frontal sinus tenderness. Left sinus exhibits no maxillary sinus tenderness and no frontal sinus tenderness.   Mouth/Throat: Uvula is midline, oropharynx is clear and moist and mucous membranes are normal. No trismus in the jaw. Normal dentition. No uvula swelling. No posterior oropharyngeal erythema.   Eyes: Pupils are equal, round, and reactive to light. Conjunctivae, EOM and lids are normal.  No scleral icterus.   Sclera clear bilat   Neck: Trachea normal, normal range of motion, full passive range of motion without pain and phonation normal. Neck supple.   Cardiovascular: Normal rate, regular rhythm, normal heart sounds, intact distal pulses and normal pulses.   Pulmonary/Chest: Effort normal and breath sounds normal. No accessory muscle usage or stridor. No respiratory distress. She has no decreased breath sounds. She has no wheezes. She has no rhonchi. She has no rales.   Abdominal: Soft. Normal appearance and bowel sounds are normal. She exhibits no distension. There is no tenderness.   Musculoskeletal: Normal range of motion. She exhibits no edema or deformity.   FROM to upper and lower extremities bilateral. 5/5 strength and full sensation bilateral. 2+ radial pulses bilateral. No numbness or tingling. Able to ambulate without difficulty.   Lymphadenopathy:     She has no cervical adenopathy.   Neurological: She is alert and oriented to person, place, and time. She has normal strength. No cranial nerve deficit or sensory deficit. She exhibits normal muscle tone. She displays a negative Romberg sign. Coordination normal.   Skin: Skin is warm, dry and intact. Capillary refill takes less than 2 seconds. Rash noted. No abrasion, no bruising, no burn, no ecchymosis, no laceration, no lesion, no petechiae and no purpura noted. Rash is urticarial. Rash is not macular, not papular, not maculopapular, not nodular, not pustular and not vesicular. She is not diaphoretic. There is erythema. No pallor.   Diffuse erythematous plaques/wheals localized to face, upper chest and hands bilateral. Patient reports very pruritic. No induration, fluctuance, warmth, TTP, drainage, oozing or weeping noted.    Psychiatric: She has a normal mood and affect. Her speech is normal and behavior is normal. Judgment and thought content normal. Cognition and memory are normal.   Nursing note and vitals reviewed.      Results for  orders placed or performed in visit on 06/28/19   POCT Glucose, Hand-Held Device   Result Value Ref Range    POC Glucose 322 (A) 70 - 110 MG/DL     Assessment:       1. Rash    2. Allergic dermatitis    3. Abnormal glucose        Plan:         Rash  -     hydrOXYzine HCl (ATARAX) 25 MG tablet; Take 1 tablet (25 mg total) by mouth 3 (three) times daily as needed for Itching.  Dispense: 30 tablet; Refill: 0  -     Ambulatory referral to Dermatology  -     triamcinolone acetonide 0.1% (KENALOG) 0.1 % cream; Apply to wound twice a day  Dispense: 1 Tube; Refill: 0  -     dexamethasone injection 6 mg    Allergic dermatitis  -     hydrOXYzine HCl (ATARAX) 25 MG tablet; Take 1 tablet (25 mg total) by mouth 3 (three) times daily as needed for Itching.  Dispense: 30 tablet; Refill: 0  -     Ambulatory referral to Dermatology  -     triamcinolone acetonide 0.1% (KENALOG) 0.1 % cream; Apply to wound twice a day  Dispense: 1 Tube; Refill: 0  -     dexamethasone injection 6 mg    Abnormal glucose  -     POCT Glucose, Hand-Held Device    Other orders  -     neomycin-polymyxin-dexamethasone (MAXITROL) 3.5mg/mL-10,000 unit/mL-0.1 % DrpS; Place 1 drop into both eyes every 6 (six) hours. for 5 days  Dispense: 5 mL; Refill: 0      Patient Instructions     You must understand that you've received an Urgent Care treatment only and that you may be released before all your medical problems are known or treated. You, the patient, will arrange for follow up care as instructed.  Follow up with your PCP or specialty clinic as directed if not improved or as needed. You can call [phone] to schedule an appointment with the appropriate provider.  If your condition worsens we recommend that you receive another evaluation at the Emergency Department for any concerns or worsening of condition.  Patient aware and verbalized understanding.    You received a steroid shot today - this can elevate your blood pressure, elevate your blood sugar,  water weight gain, nervous energy, redness to the face and dimpling of the skin where the shot goes in.   Patient aware, verbalized understanding and agreed with plan of care.    Drink plenty of fluids and get lots of rest.  Avoid picking/manipulating the wound.   Keep dry and clean after shower/bath and then apply ointment to area of concern.  Cover during the day to avoid friction constantly rubbing up against the area of irritation.  Eye drops RX as prescribed.  Atarax RX as prescribed for itching - will cause drowsiness.  Follow-up with your PCP for further evaluation as needed.  You should seek ER treatment if fever, increase pain, swelling, red streaks from affected area or other signs of increasing infection.   ER precautions given to patient.  Patient aware and verbalized understanding.    Cuidado personal para erupciones cutáneas    Agueda erupción cutánea es agueda reacción de la piel a agueda sustancia a la cual el cuerpo es sensible. La mayoría de las erupciones cutáneas pueden tratarse en casa manteniendo la piel limpia y seca. Muchas erupciones se autolimitan y pueden resolverse al cabo de dos o pari días. Las erupciones que pican, supuran o duelen pueden requerir atención médica, en especial, si el salpullido va empeorando.  Causas comunes de las erupciones cutáneas  · Insolación, causada por agueda exposición excesiva al sol.  · Agueda reacción alérgica a un alimento, a agueda planta o a un producto químico. Por ejemplo, los camarones, la hiedra venenosa o los productos de limpieza.  · Agueda infección causada por un hongo (prerna la tiña), un virus (prerna la varicela) o agudea bacteria (prerna el estreptococo).  · Mordeduras o infestaciones debidas a insectos o plagas prerna garrapatas, piojos o ácaros.  · Piel seca, que suele verse cassandra los meses de invierno y en la gente mayor.  Control de la picazón y del daño en la piel  · McConnell jack suavizantes. Pruebe a colocar agueda taza de valery cocida en agueda lyly con agua tibia. El agua  que se evapora está enfriando la piel.  · Anupam lo posible por no rascarse. Recorte las uñas; en especial, de los niños pequeños, para reducir el daño en la piel si no mathew de rascarse.  · Use agueda loción humectante para la piel en lugar de rascarse la piel seca.  · Use protector solar cada vez que vaya a exponerse a la hari directa del sol.  Use solo agentes limpiadores suaves siempre que sea posible.  · Lávese con un jabón suave no irritante y agua templada.  · Use ropa que permita el paso de aire, prerna camisas de algodón o zapatos de leelee.  · Si la erupción produce secreción de líquido, cúbrala con agueda gasa limpia, sin apretarla, para absorber la secreción.  · Muchas erupciones son contagiosas. Evite que el salpullido se propague a otras personas lavándose las janeth con frecuencia antes o después de tocar a alguien que tenga agueda erupción cutánea.  Use medicamentos  · Los antihistamínicos, prerna la difenhidramina, pueden ayudar a controlar la picazón en muchas erupciones.  · Agueda pomada con hidrocortisona en las erupciones pequeñas puede ayudar a reducir la hinchazón y el enrojecimiento.La mayoría de los medicamentos antifúngicos para tratar el pie de atleta y muchas otras infecciones de la piel causadas por hongos.  Consulte con yadav proveedor si:  · Le dijeron que tiene agueda infección fúngica en la piel.  · Tiene preguntas o dudas sobre los efectos secundarios de un medicamento.   Llame al 911 si:  · Nota que la lengua o los labios comienzan a hincharse.  · Tiene dificultad para respirar.  Llame a yadav proveedor de atención médica si:  · Tiene agueda fiebre superior a 101.0°F (38.3°C)  · Tiene irritación de garganta, tos o cansancio inusual.  · Tiene agueda erupción que se vuelve cada vez más natalia, supurante o dolorosa (síntomas de infección).  · Tiene agueda erupción que le cubre la patty, los genitales y la mayor parte del cuerpo.  · Tiene ulceraciones con costra o anillos rojos que comienzan a extenderse.  · Tuvo contacto con  alguien que tenía un salpullido contagioso, prerna sarna o piojos.  · Tiene agueda erupción natalia y redonda con un centro de color troy (esto es un síntoma de la enfermedad de Lyme).  · Le dijeron que tiene agueda bacteria resistente (MRSA, por niyah siglas en inglés) en la piel.   Date Last Reviewed: 5/12/2015  © 5334-2226 [employer]. [address] [place] [address], [place] [address]. Todos los derechos reservados. Esta información no pretende sustituir la atención médica profesional. Sólo yadav médico puede diagnosticar y tratar un problema de jake.

## 2019-07-01 ENCOUNTER — TELEPHONE (OUTPATIENT)
Dept: URGENT CARE | Facility: CLINIC | Age: 69
End: 2019-07-01

## 2019-07-09 DIAGNOSIS — E11.65 TYPE 2 DIABETES MELLITUS WITH HYPERGLYCEMIA, WITHOUT LONG-TERM CURRENT USE OF INSULIN: ICD-10-CM

## 2019-07-09 DIAGNOSIS — E11.43 TYPE 2 DIABETES MELLITUS WITH DIABETIC AUTONOMIC NEUROPATHY, WITHOUT LONG-TERM CURRENT USE OF INSULIN: ICD-10-CM

## 2019-07-09 RX ORDER — METFORMIN HYDROCHLORIDE 500 MG/1
TABLET ORAL
Qty: 180 TABLET | Refills: 3 | Status: SHIPPED | OUTPATIENT
Start: 2019-07-09 | End: 2019-07-24 | Stop reason: SDUPTHER

## 2019-07-23 ENCOUNTER — OFFICE VISIT (OUTPATIENT)
Dept: OPTOMETRY | Facility: CLINIC | Age: 69
End: 2019-07-23
Payer: MEDICARE

## 2019-07-23 DIAGNOSIS — E11.9 TYPE 2 DIABETES MELLITUS WITHOUT RETINOPATHY: Primary | ICD-10-CM

## 2019-07-23 DIAGNOSIS — H52.7 REFRACTIVE ERROR: ICD-10-CM

## 2019-07-23 DIAGNOSIS — Z96.1 PSEUDOPHAKIA OF BOTH EYES: ICD-10-CM

## 2019-07-23 DIAGNOSIS — H04.123 BILATERAL DRY EYES: ICD-10-CM

## 2019-07-23 PROCEDURE — 92015 DETERMINE REFRACTIVE STATE: CPT | Mod: HCNC,S$GLB,, | Performed by: OPTOMETRIST

## 2019-07-23 PROCEDURE — 92015 PR REFRACTION: ICD-10-PCS | Mod: HCNC,S$GLB,, | Performed by: OPTOMETRIST

## 2019-07-23 PROCEDURE — 99499 RISK ADDL DX/OHS AUDIT: ICD-10-PCS | Mod: S$GLB,,, | Performed by: OPTOMETRIST

## 2019-07-23 PROCEDURE — 99499 UNLISTED E&M SERVICE: CPT | Mod: S$GLB,,, | Performed by: OPTOMETRIST

## 2019-07-23 PROCEDURE — 99999 PR PBB SHADOW E&M-EST. PATIENT-LVL II: CPT | Mod: PBBFAC,HCNC,, | Performed by: OPTOMETRIST

## 2019-07-23 PROCEDURE — 92014 COMPRE OPH EXAM EST PT 1/>: CPT | Mod: HCNC,S$GLB,, | Performed by: OPTOMETRIST

## 2019-07-23 PROCEDURE — 92014 PR EYE EXAM, EST PATIENT,COMPREHESV: ICD-10-PCS | Mod: HCNC,S$GLB,, | Performed by: OPTOMETRIST

## 2019-07-23 PROCEDURE — 99999 PR PBB SHADOW E&M-EST. PATIENT-LVL II: ICD-10-PCS | Mod: PBBFAC,HCNC,, | Performed by: OPTOMETRIST

## 2019-07-23 NOTE — PROGRESS NOTES
DION WAKEFIELD 07/2018  Diabetic doesn't monitor BS at home.    Patient has skin   allergies that sometimes effect eyes.  Sometimes vision is blurred off and   on.  Eyes are also red and itch.  Using antibiotic drops  Off and on she   got from the hospital the last time she had a flare up from allergies.    Has glasses for reading, about 2 yrs. Old.  Hemoglobin A1C       Date                     Value               Ref Range             Status                06/25/2018               7.6 (H)             4.0 - 5.6 %           Final                 04/28/2017               12.1 (H)            4.5 - 6.2 %           Final              Last edited by Coco Powell on 7/23/2019  3:26 PM. (History)            Assessment /Plan     For exam results, see Encounter Report.    Type 2 diabetes mellitus without retinopathy    Pseudophakia of both eyes    Bilateral dry eyes    Refractive error      1. No diabetic retinopathy, no csme. Return in 1 year for dilated eye exam.  2. Monitor condition. Patient to report any changes. RTC 1 year recheck.  3. Recommend artificial tears. 1 drop 2x per day. Chronicity of disease and treatment discussed.  4. New Spec Rx given. Different lens options discussed with patient. RTC 1 year full exam.

## 2019-07-24 ENCOUNTER — OFFICE VISIT (OUTPATIENT)
Dept: URGENT CARE | Facility: CLINIC | Age: 69
End: 2019-07-24
Payer: MEDICARE

## 2019-07-24 VITALS
SYSTOLIC BLOOD PRESSURE: 140 MMHG | TEMPERATURE: 98 F | OXYGEN SATURATION: 96 % | HEART RATE: 85 BPM | BODY MASS INDEX: 26.68 KG/M2 | HEIGHT: 67 IN | WEIGHT: 170 LBS | RESPIRATION RATE: 18 BRPM | DIASTOLIC BLOOD PRESSURE: 75 MMHG

## 2019-07-24 DIAGNOSIS — R73.9 HYPERGLYCEMIA: ICD-10-CM

## 2019-07-24 DIAGNOSIS — I10 ESSENTIAL HYPERTENSION: ICD-10-CM

## 2019-07-24 DIAGNOSIS — N89.8 VAGINAL IRRITATION: Primary | ICD-10-CM

## 2019-07-24 DIAGNOSIS — E11.65 TYPE 2 DIABETES MELLITUS WITH HYPERGLYCEMIA, WITHOUT LONG-TERM CURRENT USE OF INSULIN: ICD-10-CM

## 2019-07-24 LAB
BILIRUB UR QL STRIP: NEGATIVE
GLUCOSE SERPL-MCNC: 375 MG/DL (ref 70–110)
GLUCOSE UR QL STRIP: POSITIVE
KETONES UR QL STRIP: NEGATIVE
LEUKOCYTE ESTERASE UR QL STRIP: NEGATIVE
PH, POC UA: 6 (ref 5–8)
POC BLOOD, URINE: NEGATIVE
POC NITRATES, URINE: NEGATIVE
PROT UR QL STRIP: NEGATIVE
SP GR UR STRIP: 1.01 (ref 1–1.03)
UROBILINOGEN UR STRIP-ACNC: NORMAL (ref 0.1–1.1)

## 2019-07-24 PROCEDURE — 99213 OFFICE O/P EST LOW 20 MIN: CPT | Mod: S$GLB,,, | Performed by: NURSE PRACTITIONER

## 2019-07-24 PROCEDURE — 81003 URINALYSIS AUTO W/O SCOPE: CPT | Mod: QW,S$GLB,, | Performed by: NURSE PRACTITIONER

## 2019-07-24 PROCEDURE — 99499 RISK ADDL DX/OHS AUDIT: ICD-10-PCS | Mod: S$GLB,,, | Performed by: NURSE PRACTITIONER

## 2019-07-24 PROCEDURE — 81003 POCT URINALYSIS, DIPSTICK, AUTOMATED, W/O SCOPE: ICD-10-PCS | Mod: QW,S$GLB,, | Performed by: NURSE PRACTITIONER

## 2019-07-24 PROCEDURE — 3077F PR MOST RECENT SYSTOLIC BLOOD PRESSURE >= 140 MM HG: ICD-10-PCS | Mod: CPTII,S$GLB,, | Performed by: NURSE PRACTITIONER

## 2019-07-24 PROCEDURE — 3078F DIAST BP <80 MM HG: CPT | Mod: CPTII,S$GLB,, | Performed by: NURSE PRACTITIONER

## 2019-07-24 PROCEDURE — 82962 GLUCOSE BLOOD TEST: CPT | Mod: S$GLB,,, | Performed by: NURSE PRACTITIONER

## 2019-07-24 PROCEDURE — 1101F PR PT FALLS ASSESS DOC 0-1 FALLS W/OUT INJ PAST YR: ICD-10-PCS | Mod: CPTII,S$GLB,, | Performed by: NURSE PRACTITIONER

## 2019-07-24 PROCEDURE — 1101F PT FALLS ASSESS-DOCD LE1/YR: CPT | Mod: CPTII,S$GLB,, | Performed by: NURSE PRACTITIONER

## 2019-07-24 PROCEDURE — 99499 UNLISTED E&M SERVICE: CPT | Mod: S$GLB,,, | Performed by: NURSE PRACTITIONER

## 2019-07-24 PROCEDURE — 3078F PR MOST RECENT DIASTOLIC BLOOD PRESSURE < 80 MM HG: ICD-10-PCS | Mod: CPTII,S$GLB,, | Performed by: NURSE PRACTITIONER

## 2019-07-24 PROCEDURE — 99213 PR OFFICE/OUTPT VISIT, EST, LEVL III, 20-29 MIN: ICD-10-PCS | Mod: S$GLB,,, | Performed by: NURSE PRACTITIONER

## 2019-07-24 PROCEDURE — 3077F SYST BP >= 140 MM HG: CPT | Mod: CPTII,S$GLB,, | Performed by: NURSE PRACTITIONER

## 2019-07-24 PROCEDURE — 82962 POCT GLUCOSE, HAND-HELD DEVICE: ICD-10-PCS | Mod: S$GLB,,, | Performed by: NURSE PRACTITIONER

## 2019-07-24 RX ORDER — METFORMIN HYDROCHLORIDE 500 MG/1
500 TABLET ORAL 2 TIMES DAILY WITH MEALS
Qty: 180 TABLET | Refills: 0 | Status: SHIPPED | OUTPATIENT
Start: 2019-07-24 | End: 2021-06-14 | Stop reason: SDUPTHER

## 2019-07-24 RX ORDER — CLOTRIMAZOLE AND BETAMETHASONE DIPROPIONATE 10; .64 MG/G; MG/G
CREAM TOPICAL
Qty: 15 G | Refills: 1 | Status: SHIPPED | OUTPATIENT
Start: 2019-07-24 | End: 2020-07-23

## 2019-07-24 RX ORDER — ENALAPRIL MALEATE 10 MG/1
10 TABLET ORAL DAILY
Qty: 90 TABLET | Refills: 0 | Status: SHIPPED | OUTPATIENT
Start: 2019-07-24 | End: 2021-06-14 | Stop reason: SDUPTHER

## 2019-07-24 RX ORDER — FLUCONAZOLE 150 MG/1
150 TABLET ORAL DAILY
Qty: 2 TABLET | Refills: 0 | Status: SHIPPED | OUTPATIENT
Start: 2019-07-24 | End: 2019-07-26

## 2019-07-24 NOTE — PATIENT INSTRUCTIONS
Please follow up with your Primary care provider within 2-5 days if your signs and symptoms have not resolved or worsen.     If your condition worsens or fails to improve we recommend that you receive another evaluation at the emergency room immediately or contact your primary medical clinic to discuss your concerns.   You must understand that you have received an Urgent Care treatment only and that you may be released before all of your medical problems are known or treated. You, the patient, will arrange for follow up care as instructed.     RED FLAGS/WARNING SYMPTOMS DISCUSSED WITH PATIENT THAT WOULD WARRANT EMERGENT MEDICAL ATTENTION. PATIENT VERBALIZED UNDERSTANDING.       Diabetescon [place] Nivel De Azúcar En La Sonali [Diabetes With High Blood Sugar]  Usted ha recibido tratamiento por tener un alto nivel de azúcar en la sonali (hiperglucemia). Keri puede deberse a agueda infección u otra enfermedad, a no chris seguido la dieta (chris comido demasiados dulces o alimentos con almidón), a no haberse aplicado suficiente insulina, u a otros factores.  Cuidados En [place]:  1) Un alto nivel de azúcar en la sonali puede ocasionar síntomas que usted puede aprender a reconocer, tales prerna los que se enumeran más abajo.  2) Si oskar que yadav nivel de azúcar en la sonali quizás esté demasiado alto, mídalo mediante agueda prueba de orina o de sonali. Si se encuentra por encima de niyah valores habituales, para corregirlo, emplee la dosis de insulina regular de la escala móvil que le kathleen yadav médico. Si no recibió ninguna indicación respecto de agueda escala móvil, comuníquese con yadav médico para que le aconseje.  3) Si el nivel de azúcar que tiene en la sonali es superior a 300 y no puede comunicarse con yadav médico, llame o regrese a keri centro.  4) Controle y anote niyah niveles de azúcar en la sonali (y la dosis de insulina, si la utiliza) al menos dos veces al día (antes del desayuno y antes de la susana). Hágalo cassandra los próximos 3  a 5 días.  Visita De Control:  Visite a yadav médico cassandra la próxima semana para que revise niyah registros de azúcar en sonali. [place] si necesita ajustar la dosis de insulina que está utilizando u otro medicamento que esté tomando para el azúcar en la sonali.  Busque Prontamente Atención Médica  si algo de lo siguiente ocurre:  -- NIVEL ALTO DE AZÚCAR EN LA SONALI: ganas de orinar frecuentes, mareos, sed, dolor de tesfaye, náuseas o vómito, dolor abdominal, somnolencia o pérdida de conciencia.  -- NIVEL BAJO DE AZÚCAR EN SONALI: fatiga, dolor de tesfaye, temblores, sudoración, hambre, ansiedad, visión reducida, mareos, debilidad, confusión o pérdida de conciencia, convulsiones.  Date Last Reviewed: 3/23/2015  © 7340-1744 [employer]. [address] [place] [address]. Todos los derechos reservados. Esta información no pretende sustituir la atención médica profesional. Sólo yadav médico puede diagnosticar y tratar un problema de jake.        Discharge Instructions for High Blood Pressure (Hypertension)  You have been diagnosed with high blood pressure (also called hypertension). This means the force of blood against your artery walls is too strong. It also means your heart is working hard to move blood. High blood pressure usually has no symptoms, but over time, it can damage your heart, blood vessels, eyes, kidneys, and other organs. With help from your doctor, you can manage your blood pressure and protect your health.  Taking medicine  · Learn to take your own blood pressure. Keep a record of your results. Ask your doctor which readings mean that you need medical attention.  · Take your blood pressure medicine exactly as directed. Dont skip doses. Missing doses can cause your blood pressure to get out of control.  · If you do miss a dose (or doses) check with your healthcare provider about what to do.  · Avoid medicine that contain heart stimulants, including over-the-counter drugs. Check for  "warnings about high blood pressure on the label. Ask the pharmacist before purchasing something you haven't used before  · Check with your doctor or pharmacist before taking a decongestant. Some decongestants can worsen high blood pressure.  Lifestyle changes  · Maintain a healthy weight. Get help to lose any extra pounds.  · Cut back on salt.  ¨ Limit canned, dried, packaged, and fast foods.  ¨ Dont add salt to your food at the table.  ¨ Season foods with herbs instead of salt when you cook.  ¨ Request no added salt when you go to a restaurant.  ¨ The American Heart Associations (AHA) "ideal" sodium intake recommendation is 1,500 milligrams per day.  However, since American's eat so much salt, the AHA says a positive change can occur by cutting back to even 2,400 milligrams of sodium a day.   · Follow the DASH (Dietary Approaches to Stop Hypertension) eating plan. This plan recommends vegetables, fruits, whole gains, and other heart healthy foods.  · Begin an exercise program. Ask your doctor how to get started. The American Heart Association recommends aerobic exercise 3 to 4 times a week for an average of 40 minutes at a time, with your doctor's approval. Simple activities like walking or gardening can help.  · Break the smoking habit. Enroll in a stop-smoking program to improve your chances of success. Ask your healthcare provider about programs and medicines to help you stop smoking.  · Limit drinks that contain caffeine (coffee, black or green tea, cola) to 2 per day.  · Never take stimulants such as amphetamines or cocaine; these drugs can be deadly for someone with high blood pressure.  · Control your stress. Learn stress-management techniques.  · Limit alcohol to no more than 1 drink a day for women and 2 drinks a day for men.  Follow-up care  Make a follow-up appointment as directed by our staff.     When to seek medical care  Call your doctor immediately or seek emergency care if you have any of the " following:  · Chest pain or shortness of breath (call 911)  · Moderate to severe headache  · Weakness in the muscles of your face, arms, or legs  · Trouble speaking  · Extreme drowsiness  · Confusion  · Fainting or dizziness  · Pulsating or rushing sound in your ears  · Unexplained nosebleed  · Weakness, tingling, or numbness of your face, arms, or legs  · Change in vision  · Blood pressure measured at home that is greater than 180/110   Date Last Reviewed: 4/27/2016  © 3860-2728 [employer]. [address] [place] [address]. All rights reserved. This information is not intended as a substitute for professional medical care. Always follow your healthcare professional's instructions.

## 2019-07-24 NOTE — PROGRESS NOTES
"Subjective:       Patient ID: Amanda Reyes is a 69 y.o. female.    Vitals:  height is 5' 7" (1.702 m) and weight is 77.1 kg (170 lb). Her temperature is 97.9 °F (36.6 °C). Her blood pressure is 140/75 (abnormal) and her pulse is 85. Her respiration is 18 and oxygen saturation is 96%.     Chief Complaint: Dysuria    Dysuria    This is a new problem. The current episode started yesterday. The problem has been gradually improving. The quality of the pain is described as burning (Itching ). The pain is at a severity of 10/10. The pain is severe. There has been no fever. Associated symptoms include frequency. Pertinent negatives include no chills, hematuria, nausea, urgency, vomiting or rash. She has tried nothing for the symptoms. The treatment provided no relief.       Constitution: Negative for chills and fever.   Neck: Negative for painful lymph nodes.   Gastrointestinal: Negative for abdominal pain, nausea and vomiting.   Genitourinary: Positive for dysuria and frequency. Negative for urgency, urine decreased, hematuria, history of kidney stones, painful menstruation, irregular menstruation, missed menses, heavy menstrual bleeding, ovarian cysts, genital trauma, vaginal pain, vaginal discharge, vaginal bleeding, vaginal odor, painful intercourse, genital sore, painful ejaculation and pelvic pain.   Musculoskeletal: Negative for back pain.   Skin: Negative for rash and lesion.   Hematologic/Lymphatic: Negative for swollen lymph nodes.       Objective:      Physical Exam   Constitutional: She is oriented to person, place, and time. She appears well-developed and well-nourished. She is cooperative.  Non-toxic appearance. She does not appear ill. No distress.   HENT:   Head: Normocephalic and atraumatic.   Right Ear: Hearing, tympanic membrane, external ear and ear canal normal.   Left Ear: Hearing, tympanic membrane, external ear and ear canal normal.   Nose: Nose normal. No mucosal edema, rhinorrhea or nasal " deformity. No epistaxis. Right sinus exhibits no maxillary sinus tenderness and no frontal sinus tenderness. Left sinus exhibits no maxillary sinus tenderness and no frontal sinus tenderness.   Mouth/Throat: Uvula is midline, oropharynx is clear and moist and mucous membranes are normal. No trismus in the jaw. Normal dentition. No uvula swelling. No posterior oropharyngeal erythema.   Eyes: Conjunctivae and lids are normal. Right eye exhibits no discharge. Left eye exhibits no discharge. No scleral icterus.   Sclera clear bilat   Neck: Trachea normal, normal range of motion, full passive range of motion without pain and phonation normal. Neck supple.   Cardiovascular: Normal rate, regular rhythm, normal heart sounds, intact distal pulses and normal pulses.   Pulmonary/Chest: Effort normal and breath sounds normal. No respiratory distress.   Abdominal: Soft. Normal appearance and bowel sounds are normal. She exhibits no distension, no pulsatile midline mass and no mass. There is no tenderness.   Genitourinary:   Genitourinary Comments: Patient declined vaginal exam stating she does not have a vaginal discharge or odor. She  C/o vaginal irritation, itching and burning with urination.    Musculoskeletal: Normal range of motion. She exhibits no edema or deformity.   Neurological: She is alert and oriented to person, place, and time. She exhibits normal muscle tone. Coordination normal.   Skin: Skin is warm, dry and intact. She is not diaphoretic. No pallor.   Psychiatric: She has a normal mood and affect. Her speech is normal and behavior is normal. Judgment and thought content normal. Cognition and memory are normal.   Nursing note and vitals reviewed.      Assessment:       1. Vaginal irritation    2. Hyperglycemia    3. Essential hypertension    4. Type 2 diabetes mellitus with hyperglycemia, without long-term current use of insulin        Plan:         Vaginal irritation  -     POCT Urinalysis, Dipstick, Automated,  W/O Scope  -     clotrimazole-betamethasone 1-0.05% (LOTRISONE) cream; Apply to affected area 2 times daily  Dispense: 15 g; Refill: 1  -     fluconazole (DIFLUCAN) 150 MG Tab; Take 1 tablet (150 mg total) by mouth once daily. for 2 days  Dispense: 2 tablet; Refill: 0    Hyperglycemia  -     POCT Glucose, Hand-Held Device    Essential hypertension  -     enalapril (VASOTEC) 10 MG tablet; Take 1 tablet (10 mg total) by mouth once daily.  Dispense: 90 tablet; Refill: 0  Requested medication refill     Type 2 diabetes mellitus with hyperglycemia, without long-term current use of insulin  -     metFORMIN (GLUCOPHAGE) 500 MG tablet; Take 1 tablet (500 mg total) by mouth 2 (two) times daily with meals.  Dispense: 180 tablet; Refill: 0  Requested medication refill    Results for orders placed or performed in visit on 07/24/19   POCT Urinalysis, Dipstick, Automated, W/O Scope   Result Value Ref Range    POC Blood, Urine Negative Negative    POC Bilirubin, Urine Negative Negative    POC Urobilinogen, Urine Normal 0.1 - 1.1    POC Ketones, Urine Negative Negative    POC Protein, Urine Negative Negative    POC Nitrates, Urine Negative Negative    POC Glucose, Urine Positive (A) Negative    pH, UA 6.0 5 - 8    POC Specific Gravity, Urine 1.015 1.003 - 1.029    POC Leukocytes, Urine Negative Negative   POCT Glucose, Hand-Held Device   Result Value Ref Range    POC Glucose 375 (A) 70 - 110 MG/DL         Please follow up with your Primary care provider within 2-5 days if your signs and symptoms have not resolved or worsen.     If your condition worsens or fails to improve we recommend that you receive another evaluation at the emergency room immediately or contact your primary medical clinic to discuss your concerns.   You must understand that you have received an Urgent Care treatment only and that you may be released before all of your medical problems are known or treated. You, the patient, will arrange for follow up care as  instructed.     RED FLAGS/WARNING SYMPTOMS DISCUSSED WITH PATIENT THAT WOULD WARRANT EMERGENT MEDICAL ATTENTION. PATIENT VERBALIZED UNDERSTANDING.       Diabetescon [place] Nivel De Azúcar En La Sonali [Diabetes With High Blood Sugar]  Usted ha recibido tratamiento por tener un alto nivel de azúcar en la sonali (hiperglucemia). Keri puede deberse a agueda infección u otra enfermedad, a no chris seguido la dieta (chris comido demasiados dulces o alimentos con almidón), a no haberse aplicado suficiente insulina, u a otros factores.  Cuidados En [place]:  1) Un alto nivel de azúcar en la sonali puede ocasionar síntomas que usted puede aprender a reconocer, tales prerna los que se enumeran más abajo.  2) Si oskar que yadav nivel de azúcar en la sonali quizás esté demasiado alto, mídalo mediante agueda prueba de orina o de sonali. Si se encuentra por encima de niyah valores habituales, para corregirlo, emplee la dosis de insulina regular de la escala móvil que le kathleen yadav médico. Si no recibió ninguna indicación respecto de agueda escala móvil, comuníquese con yadav médico para que le aconseje.  3) Si el nivel de azúcar que tiene en la sonali es superior a 300 y no puede comunicarse con yadav médico, llame o regrese a keri centro.  4) Controle y anote niyah niveles de azúcar en la sonali (y la dosis de insulina, si la utiliza) al menos dos veces al día (antes del desayuno y antes de la susana). Hágalo cassandra los próximos 3 a 5 días.  Visita De Control:  Visite a yadav médico cassandra la próxima semana para que revise niyah registros de azúcar en sonali. [place] si necesita ajustar la dosis de insulina que está utilizando u otro medicamento que esté tomando para el azúcar en la sonali.  Busque Prontamente Atención Médica  si algo de lo siguiente ocurre:  -- NIVEL ALTO DE AZÚCAR EN LA SONALI: ganas de orinar frecuentes, mareos, sed, dolor de tesfaye, náuseas o vómito, dolor abdominal, somnolencia o pérdida de conciencia.  -- NIVEL BAJO DE AZÚCAR EN  CAMMY: fatiga, dolor de tesfaye, temblores, sudoración, hambre, ansiedad, visión reducida, mareos, debilidad, confusión o pérdida de conciencia, convulsiones.  Date Last Reviewed: 3/23/2015  © 7869-2744 [employer]. [address] [place] [address], [place] [address]. Todos los derechos reservados. Esta información no pretende sustituir la atención médica profesional. Sólo yadav médico puede diagnosticar y tratar un problema de jake.        Discharge Instructions for High Blood Pressure (Hypertension)  You have been diagnosed with high blood pressure (also called hypertension). This means the force of blood against your artery walls is too strong. It also means your heart is working hard to move blood. High blood pressure usually has no symptoms, but over time, it can damage your heart, blood vessels, eyes, kidneys, and other organs. With help from your doctor, you can manage your blood pressure and protect your health.  Taking medicine  · Learn to take your own blood pressure. Keep a record of your results. Ask your doctor which readings mean that you need medical attention.  · Take your blood pressure medicine exactly as directed. Dont skip doses. Missing doses can cause your blood pressure to get out of control.  · If you do miss a dose (or doses) check with your healthcare provider about what to do.  · Avoid medicine that contain heart stimulants, including over-the-counter drugs. Check for warnings about high blood pressure on the label. Ask the pharmacist before purchasing something you haven't used before  · Check with your doctor or pharmacist before taking a decongestant. Some decongestants can worsen high blood pressure.  Lifestyle changes  · Maintain a healthy weight. Get help to lose any extra pounds.  · Cut back on salt.  ¨ Limit canned, dried, packaged, and fast foods.  ¨ Dont add salt to your food at the table.  ¨ Season foods with herbs instead of salt when you cook.  ¨ Request no added salt when  "you go to a restaurant.  ¨ The American Heart Associations (AHA) "ideal" sodium intake recommendation is 1,500 milligrams per day.  However, since American's eat so much salt, the AHA says a positive change can occur by cutting back to even 2,400 milligrams of sodium a day.   · Follow the DASH (Dietary Approaches to Stop Hypertension) eating plan. This plan recommends vegetables, fruits, whole gains, and other heart healthy foods.  · Begin an exercise program. Ask your doctor how to get started. The American Heart Association recommends aerobic exercise 3 to 4 times a week for an average of 40 minutes at a time, with your doctor's approval. Simple activities like walking or gardening can help.  · Break the smoking habit. Enroll in a stop-smoking program to improve your chances of success. Ask your healthcare provider about programs and medicines to help you stop smoking.  · Limit drinks that contain caffeine (coffee, black or green tea, cola) to 2 per day.  · Never take stimulants such as amphetamines or cocaine; these drugs can be deadly for someone with high blood pressure.  · Control your stress. Learn stress-management techniques.  · Limit alcohol to no more than 1 drink a day for women and 2 drinks a day for men.  Follow-up care  Make a follow-up appointment as directed by our staff.     When to seek medical care  Call your doctor immediately or seek emergency care if you have any of the following:  · Chest pain or shortness of breath (call 911)  · Moderate to severe headache  · Weakness in the muscles of your face, arms, or legs  · Trouble speaking  · Extreme drowsiness  · Confusion  · Fainting or dizziness  · Pulsating or rushing sound in your ears  · Unexplained nosebleed  · Weakness, tingling, or numbness of your face, arms, or legs  · Change in vision  · Blood pressure measured at home that is greater than 180/110   Date Last Reviewed: 4/27/2016  © 2602-0908 [employer]. [address], [place] [address]. All rights reserved. This information is not intended as a substitute for professional medical care. Always follow your healthcare professional's instructions.

## 2019-07-27 ENCOUNTER — HOSPITAL ENCOUNTER (EMERGENCY)
Facility: HOSPITAL | Age: 69
Discharge: HOME | End: 2019-07-27
Attending: EMERGENCY MEDICINE
Payer: MEDICARE

## 2019-07-27 VITALS
HEIGHT: 67 IN | WEIGHT: 180 LBS | DIASTOLIC BLOOD PRESSURE: 73 MMHG | OXYGEN SATURATION: 95 % | BODY MASS INDEX: 28.25 KG/M2 | RESPIRATION RATE: 14 BRPM | SYSTOLIC BLOOD PRESSURE: 140 MMHG | HEART RATE: 68 BPM | TEMPERATURE: 98 F

## 2019-07-27 DIAGNOSIS — N30.90 CYSTITIS: Primary | ICD-10-CM

## 2019-07-27 DIAGNOSIS — R73.9 HYPERGLYCEMIA: ICD-10-CM

## 2019-07-27 DIAGNOSIS — N81.10 BLADDER PROLAPSE, FEMALE, ACQUIRED: ICD-10-CM

## 2019-07-27 LAB
ALBUMIN SERPL BCP-MCNC: 4.4 G/DL (ref 3.5–5.2)
ALP SERPL-CCNC: 84 U/L (ref 55–135)
ALT SERPL W/O P-5'-P-CCNC: 13 U/L (ref 10–44)
ANION GAP SERPL CALC-SCNC: 12 MMOL/L (ref 8–16)
AST SERPL-CCNC: 18 U/L (ref 10–40)
BACTERIA #/AREA URNS HPF: ABNORMAL /HPF
BASOPHILS # BLD AUTO: 0.02 K/UL (ref 0–0.2)
BASOPHILS NFR BLD: 0.2 % (ref 0–1.9)
BILIRUB SERPL-MCNC: 0.5 MG/DL (ref 0.1–1)
BILIRUB UR QL STRIP: NEGATIVE
BUN SERPL-MCNC: 15 MG/DL (ref 8–23)
CALCIUM SERPL-MCNC: 10.3 MG/DL (ref 8.7–10.5)
CHLORIDE SERPL-SCNC: 100 MMOL/L (ref 95–110)
CLARITY UR: CLEAR
CO2 SERPL-SCNC: 23 MMOL/L (ref 23–29)
COLOR UR: YELLOW
CREAT SERPL-MCNC: 1.1 MG/DL (ref 0.5–1.4)
DIFFERENTIAL METHOD: NORMAL
EOSINOPHIL # BLD AUTO: 0.1 K/UL (ref 0–0.5)
EOSINOPHIL NFR BLD: 1.6 % (ref 0–8)
ERYTHROCYTE [DISTWIDTH] IN BLOOD BY AUTOMATED COUNT: 12.3 % (ref 11.5–14.5)
EST. GFR  (AFRICAN AMERICAN): 59 ML/MIN/1.73 M^2
EST. GFR  (NON AFRICAN AMERICAN): 51 ML/MIN/1.73 M^2
GLUCOSE SERPL-MCNC: 297 MG/DL (ref 70–110)
GLUCOSE UR QL STRIP: ABNORMAL
HCT VFR BLD AUTO: 39.6 % (ref 37–48.5)
HGB BLD-MCNC: 13.5 G/DL (ref 12–16)
HGB UR QL STRIP: ABNORMAL
KETONES UR QL STRIP: NEGATIVE
LEUKOCYTE ESTERASE UR QL STRIP: ABNORMAL
LYMPHOCYTES # BLD AUTO: 2.5 K/UL (ref 1–4.8)
LYMPHOCYTES NFR BLD: 30.6 % (ref 18–48)
MCH RBC QN AUTO: 29.2 PG (ref 27–31)
MCHC RBC AUTO-ENTMCNC: 34.1 G/DL (ref 32–36)
MCV RBC AUTO: 86 FL (ref 82–98)
MICROSCOPIC COMMENT: ABNORMAL
MONOCYTES # BLD AUTO: 0.3 K/UL (ref 0.3–1)
MONOCYTES NFR BLD: 4 % (ref 4–15)
NEUTROPHILS # BLD AUTO: 5.1 K/UL (ref 1.8–7.7)
NEUTROPHILS NFR BLD: 63.6 % (ref 38–73)
NITRITE UR QL STRIP: NEGATIVE
PH UR STRIP: 8 [PH] (ref 5–8)
PLATELET # BLD AUTO: 191 K/UL (ref 150–350)
PMV BLD AUTO: 10.7 FL (ref 9.2–12.9)
POCT GLUCOSE: 273 MG/DL (ref 70–110)
POTASSIUM SERPL-SCNC: 4.3 MMOL/L (ref 3.5–5.1)
PROT SERPL-MCNC: 7.7 G/DL (ref 6–8.4)
PROT UR QL STRIP: NEGATIVE
RBC # BLD AUTO: 4.62 M/UL (ref 4–5.4)
RBC #/AREA URNS HPF: 2 /HPF (ref 0–4)
SODIUM SERPL-SCNC: 135 MMOL/L (ref 136–145)
SP GR UR STRIP: <=1.005 (ref 1–1.03)
SQUAMOUS #/AREA URNS HPF: 3 /HPF
URN SPEC COLLECT METH UR: ABNORMAL
UROBILINOGEN UR STRIP-ACNC: NEGATIVE EU/DL
WBC # BLD AUTO: 8.08 K/UL (ref 3.9–12.7)
WBC #/AREA URNS HPF: 25 /HPF (ref 0–5)
WBC CLUMPS URNS QL MICRO: ABNORMAL
YEAST URNS QL MICRO: ABNORMAL

## 2019-07-27 PROCEDURE — 82962 GLUCOSE BLOOD TEST: CPT | Mod: HCNC

## 2019-07-27 PROCEDURE — 81000 URINALYSIS NONAUTO W/SCOPE: CPT | Mod: HCNC

## 2019-07-27 PROCEDURE — 85025 COMPLETE CBC W/AUTO DIFF WBC: CPT | Mod: HCNC

## 2019-07-27 PROCEDURE — 63600175 PHARM REV CODE 636 W HCPCS: Mod: HCNC | Performed by: EMERGENCY MEDICINE

## 2019-07-27 PROCEDURE — 96360 HYDRATION IV INFUSION INIT: CPT | Mod: HCNC

## 2019-07-27 PROCEDURE — 87086 URINE CULTURE/COLONY COUNT: CPT | Mod: HCNC

## 2019-07-27 PROCEDURE — 80053 COMPREHEN METABOLIC PANEL: CPT | Mod: HCNC

## 2019-07-27 PROCEDURE — 99284 EMERGENCY DEPT VISIT MOD MDM: CPT | Mod: 25,HCNC

## 2019-07-27 RX ORDER — SODIUM CHLORIDE 9 MG/ML
1000 INJECTION, SOLUTION INTRAVENOUS
Status: DISCONTINUED | OUTPATIENT
Start: 2019-07-27 | End: 2019-07-27 | Stop reason: HOSPADM

## 2019-07-27 RX ORDER — PHENAZOPYRIDINE HYDROCHLORIDE 200 MG/1
200 TABLET, FILM COATED ORAL 3 TIMES DAILY
Qty: 6 TABLET | Refills: 0 | Status: SHIPPED | OUTPATIENT
Start: 2019-07-27 | End: 2019-08-06

## 2019-07-27 RX ORDER — NITROFURANTOIN 25; 75 MG/1; MG/1
100 CAPSULE ORAL 2 TIMES DAILY
Qty: 10 CAPSULE | Refills: 0 | Status: SHIPPED | OUTPATIENT
Start: 2019-07-27 | End: 2019-08-01

## 2019-07-27 RX ADMIN — SODIUM CHLORIDE 1000 ML: 0.9 INJECTION, SOLUTION INTRAVENOUS at 12:07

## 2019-07-27 NOTE — ED PROVIDER NOTES
"Encounter Date: 7/27/2019    SCRIBE #1 NOTE: I, Vanda Corrigan, am scribing for, and in the presence of,  Dr. Lefort. I have scribed the entire note.       History     Chief Complaint   Patient presents with    Hematuria     has been treated for UTI, pt has been on antibiotics since wed with no relief from burning sensation and nausea, pt concered due to only having one kidney      Amanda Reyes is a 69 y.o. female who  has a past medical history of Abnormal Pap smear, Anxiety, Arthritis, Cataract, Cataract of left eye, Diabetes, Diabetes mellitus, type 2, Diabetic retinopathy, Eye problem, GERD (gastroesophageal reflux disease), Head and face pain, and Hypertension.    The patient presents to the ED due to lower abdominal pain and hematuria that continued from current UTI. Patient has been on antibiotics for a UTI since 3 days ago, but reports symptoms continue to persist and worsen. She notes dysuria, and states blood in her urine is "gooey." She denies any fever or back pain. She has hx of a prolapsed bladder and DM. She is scheduled for a bladder lift next month. Per note, patient is concerned because she has hx nephrectomy.    The history is provided by the patient and a relative. The history is limited by a language barrier. A  was used.     Review of patient's allergies indicates:  No Known Allergies  Past Medical History:   Diagnosis Date    Abnormal Pap smear     Anxiety     Arthritis     Cataract     Cataract of left eye     Diabetes     Diabetes mellitus, type 2     Diabetic retinopathy     Eye problem     GERD (gastroesophageal reflux disease)     Head and face pain     Hypertension      Past Surgical History:   Procedure Laterality Date    CATARACT EXTRACTION W/  INTRAOCULAR LENS IMPLANT Left 5/12/2015    Dr. Fermin    CATARACT EXTRACTION W/  INTRAOCULAR LENS IMPLANT Right 03/21/2017    Dr. Fermin    COLONOSCOPY Suprep N/A 9/4/2018    Performed by Lilly " BRITTANY Brothers [facility]    HYSTERECTOMY      INSERTION-INTRAOCULAR LENS (IOL) Right 3/21/2017    [facility]    INSERTION-INTRAOCULAR LENS (IOL) Left 5/12/2015    [facility]    JOINT REPLACEMENT      bilateral knees    KIDNEY SURGERY      right nephrectomy    PHACOEMULSIFICATION-ASPIRATION-CATARACT Right 3/21/2017    [facility]    PHACOEMULSIFICATION-ASPIRATION-CATARACT Left 5/12/2015    [facility]    TUBAL LIGATION       Family History   Problem Relation Age of Onset    Hypertension Mother     Hypertension Father     Cataracts Father     No Known Problems Maternal Grandmother     No Known Problems Maternal Grandfather     No Known Problems Paternal Grandmother     No Known Problems Paternal Grandfather     No Known Problems Sister     No Known Problems Brother     No Known Problems Maternal Aunt     No Known Problems Maternal Uncle     No Known Problems Paternal Aunt     No Known Problems Paternal Uncle     Anesthesia problems Neg Hx     Amblyopia Neg Hx     Blindness Neg Hx     Glaucoma Neg Hx     Macular degeneration Neg Hx     Retinal detachment Neg Hx     Strabismus Neg Hx     Cancer Neg Hx     Diabetes Neg Hx     Stroke Neg Hx     Thyroid disease Neg Hx      Social History     Tobacco Use    Smoking status: Never Smoker    Smokeless tobacco: Never Used   Substance Use Topics    Alcohol use: No    Drug use: No     Review of Systems   Constitutional: Negative for chills and fever.   HENT: Negative for ear pain and sore throat.    Eyes: Negative for redness.   Respiratory: Negative for shortness of breath.    Cardiovascular: Negative for chest pain.   Gastrointestinal: Positive for abdominal pain. Negative for diarrhea and vomiting.   Genitourinary: Positive for dysuria and hematuria.    Musculoskeletal: Negative for back pain.   Skin: Negative for rash.   Neurological: Negative for headaches.       Physical Exam     Initial Vitals [07/27/19 1127]   BP Pulse Resp Temp SpO2   (!) 161/77 77 20 98.4 °F (36.9 °C) 98 %      MAP       --         Physical Exam    Nursing note and vitals reviewed.  Constitutional: She appears well-developed and well-nourished. She is not diaphoretic. No distress.   HENT:   Head: Normocephalic and atraumatic.   Eyes: Conjunctivae and EOM are normal.   Neck: Normal range of motion. Neck supple.   Cardiovascular: Normal rate, regular rhythm and normal heart sounds.   Pulmonary/Chest: Breath sounds normal. No respiratory distress.   Abdominal: Soft. There is no tenderness.   Genitourinary:   Genitourinary Comments: Remarkable for no labial irritation or inflammation.   Mild prolapse of bladder.   No urethral irritation.   Musculoskeletal: Normal range of motion. She exhibits no edema or tenderness.   Neurological: She is alert and oriented to person, place, and time. She has normal strength.   Skin: Skin is warm and dry. Capillary refill takes less than 2 seconds.         ED Course   Procedures  Labs Reviewed   COMPREHENSIVE METABOLIC PANEL - Abnormal; Notable for the following components:       Result Value    Sodium 135 (*)     Glucose 297 (*)     eGFR if  59 (*)     eGFR if non  51 (*)     All other components within normal limits   URINALYSIS, REFLEX TO URINE CULTURE - Abnormal; Notable for the following components:    Specific Gravity, UA <=1.005 (*)     Glucose, UA 3+ (*)     Occult Blood UA 1+ (*)     Leukocytes, UA 1+ (*)     All other components within normal limits    Narrative:     Preferred Collection Type->Urine, Clean Catch   URINALYSIS MICROSCOPIC - Abnormal; Notable for the following components:    WBC, UA 25 (*)     WBC Clumps, UA Few (*)     Bacteria Few (*)     All other components within normal limits    Narrative:      Preferred Collection Type->Urine, Clean Catch   POCT GLUCOSE - Abnormal; Notable for the following components:    POCT Glucose 273 (*)     All other components within normal limits   CULTURE, URINE   CBC W/ AUTO DIFFERENTIAL          Imaging Results    None          Medical Decision Making:   Differential Diagnosis:   Uti, cystitis, VV, pyelo, stone, PID, torsion  Clinical Tests:   Lab Tests: Ordered and Reviewed  ED Management:  Cystitis present without concern for pyelo, renal fxn ok, CBG elevated without acidosis, will cx urine, start macrobid/pyridium, abd benign, VSS, needs PCP and urology follow up.  Discussed indications for return as patient has one kidney and hx DM if symptoms of pyelo present needs ED return, patient and family verabalized understanding.                      Clinical Impression:       ICD-10-CM ICD-9-CM   1. Cystitis N30.90 595.9   2. Bladder prolapse, female, acquired N81.10 618.01   3. Hyperglycemia R73.9 790.29       Disposition:   Disposition: Discharged  Condition: Stable       Scribe attestation I, Dr. Guy Lefort, personally performed the services described in this documentation. All medical record entries made by the scribe were at my direction and in my presence. I have reviewed the chart and agree that the record reflects my personal performance and is accurate and complete. Guy Lefort, MD.  1:28 PM 07/27/2019       Guy J. Lefort, MD  07/27/19 1332       Guy J. Lefort, MD  07/27/19 1400

## 2019-07-27 NOTE — ED NOTES
APPEARANCE: Alert, oriented and in no acute distress.  CARDIAC: Normal rate and rhythm, no murmur heard.   PERIPHERAL VASCULAR: peripheral pulses present. Normal cap refill. No edema. Warm to touch.    RESPIRATORY:Normal rate and effort, breath sounds clear bilaterally throughout chest. Respirations are equal and unlabored no obvious signs of distress.  GASTRO: soft, bowel sounds normal, lower segment tenderness, no abdominal distention.  MUSC: Full ROM. No bony tenderness or soft tissue tenderness. No obvious deformity.  SKIN: Skin is warm and dry, normal skin turgor, mucous membranes moist.  NEURO: 5/5 strength major flexors/extensors bilaterally. Sensory intact to light touch bilaterally. Matthews coma scale: eyes open spontaneously-4, oriented & converses-5, obeys commands-6. No neurological abnormalities.   MENTAL STATUS: awake, alert and aware of environment.  EYE: PERRL, both eyes: pupils brisk and reactive to light. Normal size.

## 2019-07-27 NOTE — DISCHARGE INSTRUCTIONS
Follow up with urology upon returning home.  Follow up with PCP, ED or URgert care in 3 days for culture results

## 2019-07-29 ENCOUNTER — OFFICE VISIT (OUTPATIENT)
Dept: UROLOGY | Facility: CLINIC | Age: 69
End: 2019-07-29
Payer: MEDICARE

## 2019-07-29 VITALS
SYSTOLIC BLOOD PRESSURE: 123 MMHG | WEIGHT: 173.31 LBS | HEART RATE: 82 BPM | DIASTOLIC BLOOD PRESSURE: 77 MMHG | BODY MASS INDEX: 27.14 KG/M2

## 2019-07-29 DIAGNOSIS — N32.81 OAB (OVERACTIVE BLADDER): ICD-10-CM

## 2019-07-29 DIAGNOSIS — N30.00 ACUTE CYSTITIS WITHOUT HEMATURIA: Primary | ICD-10-CM

## 2019-07-29 LAB
BACTERIA UR CULT: NORMAL
BACTERIA UR CULT: NORMAL

## 2019-07-29 PROCEDURE — 3074F PR MOST RECENT SYSTOLIC BLOOD PRESSURE < 130 MM HG: ICD-10-PCS | Mod: HCNC,CPTII,S$GLB, | Performed by: UROLOGY

## 2019-07-29 PROCEDURE — 1101F PR PT FALLS ASSESS DOC 0-1 FALLS W/OUT INJ PAST YR: ICD-10-PCS | Mod: HCNC,CPTII,S$GLB, | Performed by: UROLOGY

## 2019-07-29 PROCEDURE — 3074F SYST BP LT 130 MM HG: CPT | Mod: HCNC,CPTII,S$GLB, | Performed by: UROLOGY

## 2019-07-29 PROCEDURE — 3078F PR MOST RECENT DIASTOLIC BLOOD PRESSURE < 80 MM HG: ICD-10-PCS | Mod: HCNC,CPTII,S$GLB, | Performed by: UROLOGY

## 2019-07-29 PROCEDURE — 1101F PT FALLS ASSESS-DOCD LE1/YR: CPT | Mod: HCNC,CPTII,S$GLB, | Performed by: UROLOGY

## 2019-07-29 PROCEDURE — 99999 PR PBB SHADOW E&M-EST. PATIENT-LVL III: CPT | Mod: PBBFAC,HCNC,, | Performed by: UROLOGY

## 2019-07-29 PROCEDURE — 3078F DIAST BP <80 MM HG: CPT | Mod: HCNC,CPTII,S$GLB, | Performed by: UROLOGY

## 2019-07-29 PROCEDURE — 99999 PR PBB SHADOW E&M-EST. PATIENT-LVL III: ICD-10-PCS | Mod: PBBFAC,HCNC,, | Performed by: UROLOGY

## 2019-07-29 PROCEDURE — 99205 OFFICE O/P NEW HI 60 MIN: CPT | Mod: HCNC,S$GLB,, | Performed by: UROLOGY

## 2019-07-29 PROCEDURE — 99205 PR OFFICE/OUTPT VISIT, NEW, LEVL V, 60-74 MIN: ICD-10-PCS | Mod: HCNC,S$GLB,, | Performed by: UROLOGY

## 2019-07-29 RX ORDER — OXYBUTYNIN CHLORIDE 5 MG/1
5 TABLET ORAL 2 TIMES DAILY
Qty: 60 TABLET | Refills: 11 | Status: SHIPPED | OUTPATIENT
Start: 2019-07-29 | End: 2021-06-14

## 2019-07-29 NOTE — PROGRESS NOTES
[facility]      New Recurrent Urinary Tract Infection Note    7/29/2019    Referred by:  Self    HPI: Amanda Reyes is a very pleasant 69 y.o. female who is a new patient to our department referred for evaluation of recurrent urinary tract infections. She reports that frequent infections began 2 years ago. These episodes are marked by symptoms including dysuria and suprapubic pain.  Her symptoms typically resolve with antibiotic therapy. The frequency of these episodes is typically every 2-3 months with approximately 3 infections over the last 6 months.  Urine cultures were available for review and generally have shown contaminated specimens. .    Independent of episodes of infection, she reports symptoms of irritative voiding including frequency, incontinence and urgency. She denies symptoms of obstructive voiding including decreased stream, hesitancy, intermittency, post void dribbling and sense of incomplete emptying. Bladder scan PVR was 0 mL.  Her history includes prior pelvic surgery (hysterectomy (for benign disease)) and  but excludes a history of urolithiasis, hematuria, neurological symptoms/diagnoses, incontinence procedures and prolapse surgeries. She reports urgency incontinence  (1 pad/day). Her history is remarkable for right nephrectomy for benign disease.      Previous evaluation for her infections have included no upper or lower tract evaluation. Previous treatments for her recurrent infections have included antibiotics for each infection.     She is bothered, she reports, by a palpable vaginal bulge. She does not splint to void or defecate.     She reports bothersome urgency incontinence.  She was prescribed Toviaz for this.  However she took this on demand rather than on a regular basis.  She has seen no improvement taking this medication in a limited fashion.    A review of 10+ systems was conducted with pertinent positive and negative findings documented in HPI with  all other systems reviewed and negative.    Past medical, surgical and social history reviewed as documented in chart with pertinent positive medical, surgical and social history detailed in HPI.    Exam Findings:    Vaginal Mucosa: mild atrophy  Anterior: none  Apical: <25% apical descent  Posterior: none  MILE: no MILE  Urethral Mobility: no hypermobility  Urethral Lesions: no lesions or masses Const: no acute distress, conversant and alert  Eyes: anicteric, extraocular muscles intact  ENMT: normocephalic, Nl oral membranes  Cardio: no cyanosis, nl cap refill  Pulm: no tachypnea; no resp distress  Abd: soft, no tenderness  Musc: no laceration, no tenderness  Neuro: alert; oriented x 3  Skin: warm, dry; no petichiae  Psych: no anxiety; normal speech       Assessment/Plan:    Recurrent Urinary Tract Infection (new, addt'l workup): Her history suggests recurrent urinary tract infections based on history of symptoms and response to therapy.  Given her history, I have recommended upper tract evaluation with Renal U/S and KUB followed by cystoscopy.  We can perform independent flow rate and repeat PVR next visit to further screen for any voiding dysfunction. In the meantime, during her evaluation we can begin measures to prevent further infections.     1. Schedule office cystoscopy  2. Renal U/S and KUB  3. Daily probiotics containing Lactobacillus species (e.g. RepHresh Pro-B, probiotic yogurts)  4. D-mannose 1000 mg bid          Pelvic Organ Prolapse:  Examination today did not demonstrate significant pelvic organ prolapse.  We discussed that pelvic organ prolapse is a dynamic process and that we could repeat her examination the time of cystourethroscopy.    Overactive Bladder with Urgency Incontinence (new, addt'l workup): Her history is suggestive of Overactive Bladder (OAB) with predominantly Urgency Urinary Incontinence (UUI). The presence or absence of incontinence as well as the relative contribution of stress or  urgency incontinence can be further clarified with a 3-day volume-based voiding/incontinence diary provided today. This will also help to screen for polyuria and help to guide us on further counseling on behavioral therapy including timed voiding and bladder training. We will review this on her return visit.     Her reported symptoms today are relatively moderate and therefore, I believe it would be appropriate to continue pharmacologic therapy in addition to behavioral therapies.  However, she prefers to take medication on demand rather than on a daily basis.  For this reason, we will switch her to oxybutynin immediate release which is more likely to provide benefit over the short period of time that she is taking it.       Clinical tests reviewed/ordered today: urinalysis (07/29/2019) U/S for post void residual (07/29/2019) urine cultures (7/27/19)   Radiology ordered/reviewed: renal U/S KUB   Medical tests ordered/reviewed: cystourethroscopy   Radiology independently reviewed: none   Previous records: reviewed today and showing, in summary - Patient with history of recurrent UTI as detailed in HPI

## 2019-07-29 NOTE — H&P (VIEW-ONLY)
[facility]      New Recurrent Urinary Tract Infection Note    7/29/2019    Referred by:  Self    HPI: Amanda Reyse is a very pleasant 69 y.o. female who is a new patient to our department referred for evaluation of recurrent urinary tract infections. She reports that frequent infections began 2 years ago. These episodes are marked by symptoms including dysuria and suprapubic pain.  Her symptoms typically resolve with antibiotic therapy. The frequency of these episodes is typically every 2-3 months with approximately 3 infections over the last 6 months.  Urine cultures were available for review and generally have shown contaminated specimens. .    Independent of episodes of infection, she reports symptoms of irritative voiding including frequency, incontinence and urgency. She denies symptoms of obstructive voiding including decreased stream, hesitancy, intermittency, post void dribbling and sense of incomplete emptying. Bladder scan PVR was 0 mL.  Her history includes prior pelvic surgery (hysterectomy (for benign disease)) and  but excludes a history of urolithiasis, hematuria, neurological symptoms/diagnoses, incontinence procedures and prolapse surgeries. She reports urgency incontinence  (1 pad/day). Her history is remarkable for right nephrectomy for benign disease.      Previous evaluation for her infections have included no upper or lower tract evaluation. Previous treatments for her recurrent infections have included antibiotics for each infection.     She is bothered, she reports, by a palpable vaginal bulge. She does not splint to void or defecate.     She reports bothersome urgency incontinence.  She was prescribed Toviaz for this.  However she took this on demand rather than on a regular basis.  She has seen no improvement taking this medication in a limited fashion.    A review of 10+ systems was conducted with pertinent positive and negative findings documented in HPI with  all other systems reviewed and negative.    Past medical, surgical and social history reviewed as documented in chart with pertinent positive medical, surgical and social history detailed in HPI.    Exam Findings:    Vaginal Mucosa: mild atrophy  Anterior: none  Apical: <25% apical descent  Posterior: none  MILE: no MILE  Urethral Mobility: no hypermobility  Urethral Lesions: no lesions or masses Const: no acute distress, conversant and alert  Eyes: anicteric, extraocular muscles intact  ENMT: normocephalic, Nl oral membranes  Cardio: no cyanosis, nl cap refill  Pulm: no tachypnea; no resp distress  Abd: soft, no tenderness  Musc: no laceration, no tenderness  Neuro: alert; oriented x 3  Skin: warm, dry; no petichiae  Psych: no anxiety; normal speech       Assessment/Plan:    Recurrent Urinary Tract Infection (new, addt'l workup): Her history suggests recurrent urinary tract infections based on history of symptoms and response to therapy.  Given her history, I have recommended upper tract evaluation with Renal U/S and KUB followed by cystoscopy.  We can perform independent flow rate and repeat PVR next visit to further screen for any voiding dysfunction. In the meantime, during her evaluation we can begin measures to prevent further infections.     1. Schedule office cystoscopy  2. Renal U/S and KUB  3. Daily probiotics containing Lactobacillus species (e.g. RepHresh Pro-B, probiotic yogurts)  4. D-mannose 1000 mg bid          Pelvic Organ Prolapse:  Examination today did not demonstrate significant pelvic organ prolapse.  We discussed that pelvic organ prolapse is a dynamic process and that we could repeat her examination the time of cystourethroscopy.    Overactive Bladder with Urgency Incontinence (new, addt'l workup): Her history is suggestive of Overactive Bladder (OAB) with predominantly Urgency Urinary Incontinence (UUI). The presence or absence of incontinence as well as the relative contribution of stress or  urgency incontinence can be further clarified with a 3-day volume-based voiding/incontinence diary provided today. This will also help to screen for polyuria and help to guide us on further counseling on behavioral therapy including timed voiding and bladder training. We will review this on her return visit.     Her reported symptoms today are relatively moderate and therefore, I believe it would be appropriate to continue pharmacologic therapy in addition to behavioral therapies.  However, she prefers to take medication on demand rather than on a daily basis.  For this reason, we will switch her to oxybutynin immediate release which is more likely to provide benefit over the short period of time that she is taking it.       Clinical tests reviewed/ordered today: urinalysis (07/29/2019) U/S for post void residual (07/29/2019) urine cultures (7/27/19)   Radiology ordered/reviewed: renal U/S KUB   Medical tests ordered/reviewed: cystourethroscopy   Radiology independently reviewed: none   Previous records: reviewed today and showing, in summary - Patient with history of recurrent UTI as detailed in HPI

## 2019-08-02 ENCOUNTER — HOSPITAL ENCOUNTER (OUTPATIENT)
Dept: RADIOLOGY | Facility: HOSPITAL | Age: 69
Discharge: HOME | End: 2019-08-02
Attending: UROLOGY
Payer: MEDICARE

## 2019-08-02 DIAGNOSIS — N30.00 ACUTE CYSTITIS WITHOUT HEMATURIA: ICD-10-CM

## 2019-08-02 PROCEDURE — 76770 US EXAM ABDO BACK WALL COMP: CPT | Mod: 26,HCNC,, | Performed by: RADIOLOGY

## 2019-08-02 PROCEDURE — 74018 RADEX ABDOMEN 1 VIEW: CPT | Mod: TC,HCNC

## 2019-08-02 PROCEDURE — 74018 RADEX ABDOMEN 1 VIEW: CPT | Mod: 26,HCNC,, | Performed by: RADIOLOGY

## 2019-08-02 PROCEDURE — 76770 US RETROPERITONEAL COMPLETE: ICD-10-PCS | Mod: 26,HCNC,, | Performed by: RADIOLOGY

## 2019-08-02 PROCEDURE — 74018 XR ABDOMEN AP 1 VIEW: ICD-10-PCS | Mod: 26,HCNC,, | Performed by: RADIOLOGY

## 2019-08-02 PROCEDURE — 76770 US EXAM ABDO BACK WALL COMP: CPT | Mod: TC,HCNC

## 2019-08-08 ENCOUNTER — HOSPITAL ENCOUNTER (OUTPATIENT)
Dept: UROLOGY | Facility: HOSPITAL | Age: 69
Discharge: HOME | End: 2019-08-08
Attending: UROLOGY
Payer: MEDICARE

## 2019-08-08 VITALS
TEMPERATURE: 98 F | WEIGHT: 173.31 LBS | RESPIRATION RATE: 18 BRPM | HEART RATE: 96 BPM | HEIGHT: 67 IN | SYSTOLIC BLOOD PRESSURE: 157 MMHG | BODY MASS INDEX: 27.2 KG/M2 | DIASTOLIC BLOOD PRESSURE: 84 MMHG

## 2019-08-08 DIAGNOSIS — N30.00 ACUTE CYSTITIS WITHOUT HEMATURIA: Primary | ICD-10-CM

## 2019-08-08 PROCEDURE — 52000 CYSTOURETHROSCOPY: CPT | Mod: HCNC

## 2019-08-08 RX ORDER — LIDOCAINE HYDROCHLORIDE 20 MG/ML
JELLY TOPICAL ONCE
Status: COMPLETED | OUTPATIENT
Start: 2019-08-08 | End: 2019-08-08

## 2019-08-08 RX ADMIN — LIDOCAINE HYDROCHLORIDE: 20 JELLY TOPICAL at 12:08

## 2019-08-08 NOTE — PROCEDURES
Office Cystourethroscopy Note    Date: 8/8/2019   Referring Provider: Riki Vasquez MD     Reason for Cystoscopy: Recurrent UTI    Upper Tract Evaluation:    Renal U/S: Normal upper urinary tract    Procedure Details: Informed consent was obtained and she was sterily prepped and 1% lidocaine jelly was injected per urethra. A flexible cystoscope was inserted into the bladder via the urethra. There was no evidence of stricture, stenosis, lesions, other obstruction or diverticulum. Significant findings included a normal unobstructed urethra only. Cystoscopic examination of the bladder revealed orthotopically positioned, normal bilateral ureteral orifices with clear yellow urine effluxing from each orifice. All mucosal surfaces were examined with no apparent stones, tumors, foreign bodies, erythema, trabeculation, diverticula or ulcers. The procedure was concluded without complications. The patient was not administered a post-procedure antibiotic.     Specimens: No Specimens    Findings: Normal bladder and urethra    Other Findings:  PVR - 0 mL    Pelvic Exam:  Vaginal Mucosa: normal  Anterior: none  Apical: no apical descent  Posterior: none  MILE: no MILE  Urethral Mobility: urethral hypermobility  Urethral Lesions: no lesions or masses     Assesment and Plan:   Recurrent Urinary Tract Infections: No primary bladder pathology identified today to explain her recurrent urinary tract infections. We have recommended continued treatment discussed at last clinic visit.

## 2019-08-08 NOTE — PATIENT INSTRUCTIONS
What to Expect After a Cystoscopy  For the next 24-48 hours, you may feel a mild burning when you urinate. This burning is normal and expected. Drink plenty of water to dilute the urine to help relieve the burning sensation. You may also see a small amount of blood in your urine after the procedure.    Unless you are already taking antibiotics, you may be given an antibiotic after the test to prevent infection.    Signs and Symptoms to Report  Call the [facility] at [phone] if you develop any of the following:  · Fever of 101 degrees or higher  · Chills or persistent bleeding  · Inability to urinate

## 2019-08-16 ENCOUNTER — PES CALL (OUTPATIENT)
Dept: ADMINISTRATIVE | Facility: CLINIC | Age: 69
End: 2019-08-16

## 2020-04-27 ENCOUNTER — PATIENT MESSAGE (OUTPATIENT)
Dept: ADMINISTRATIVE | Facility: HOSPITAL | Age: 70
End: 2020-04-27

## 2020-04-27 ENCOUNTER — PATIENT OUTREACH (OUTPATIENT)
Dept: ADMINISTRATIVE | Facility: HOSPITAL | Age: 70
End: 2020-04-27

## 2020-05-06 ENCOUNTER — PATIENT MESSAGE (OUTPATIENT)
Dept: ADMINISTRATIVE | Facility: HOSPITAL | Age: 70
End: 2020-05-06

## 2020-05-06 ENCOUNTER — PATIENT OUTREACH (OUTPATIENT)
Dept: ADMINISTRATIVE | Facility: HOSPITAL | Age: 70
End: 2020-05-06

## 2020-05-12 ENCOUNTER — PATIENT MESSAGE (OUTPATIENT)
Dept: ADMINISTRATIVE | Facility: HOSPITAL | Age: 70
End: 2020-05-12

## 2020-10-05 ENCOUNTER — PATIENT MESSAGE (OUTPATIENT)
Dept: ADMINISTRATIVE | Facility: HOSPITAL | Age: 70
End: 2020-10-05

## 2020-10-27 ENCOUNTER — PES CALL (OUTPATIENT)
Dept: ADMINISTRATIVE | Facility: CLINIC | Age: 70
End: 2020-10-27

## 2020-11-23 ENCOUNTER — TELEPHONE (OUTPATIENT)
Dept: ADMINISTRATIVE | Facility: HOSPITAL | Age: 70
End: 2020-11-23

## 2020-11-23 ENCOUNTER — PATIENT MESSAGE (OUTPATIENT)
Dept: ADMINISTRATIVE | Facility: HOSPITAL | Age: 70
End: 2020-11-23

## 2020-11-24 ENCOUNTER — TELEPHONE (OUTPATIENT)
Dept: ADMINISTRATIVE | Facility: HOSPITAL | Age: 70
End: 2020-11-24

## 2020-12-03 ENCOUNTER — PATIENT OUTREACH (OUTPATIENT)
Dept: ADMINISTRATIVE | Facility: HOSPITAL | Age: 70
End: 2020-12-03

## 2020-12-03 ENCOUNTER — PATIENT MESSAGE (OUTPATIENT)
Dept: ADMINISTRATIVE | Facility: HOSPITAL | Age: 70
End: 2020-12-03

## 2021-01-05 ENCOUNTER — PATIENT MESSAGE (OUTPATIENT)
Dept: ADMINISTRATIVE | Facility: HOSPITAL | Age: 71
End: 2021-01-05

## 2021-04-05 ENCOUNTER — PATIENT MESSAGE (OUTPATIENT)
Dept: ADMINISTRATIVE | Facility: HOSPITAL | Age: 71
End: 2021-04-05

## 2021-04-16 ENCOUNTER — PATIENT MESSAGE (OUTPATIENT)
Dept: RESEARCH | Facility: HOSPITAL | Age: 71
End: 2021-04-16

## 2021-06-14 ENCOUNTER — OFFICE VISIT (OUTPATIENT)
Dept: FAMILY MEDICINE | Facility: CLINIC | Age: 71
End: 2021-06-14
Payer: MEDICARE

## 2021-06-14 ENCOUNTER — LAB VISIT (OUTPATIENT)
Dept: LAB | Facility: HOSPITAL | Age: 71
End: 2021-06-14
Attending: FAMILY MEDICINE
Payer: MEDICARE

## 2021-06-14 VITALS
SYSTOLIC BLOOD PRESSURE: 138 MMHG | HEIGHT: 67 IN | BODY MASS INDEX: 26.68 KG/M2 | HEART RATE: 71 BPM | DIASTOLIC BLOOD PRESSURE: 74 MMHG | WEIGHT: 170 LBS | OXYGEN SATURATION: 97 %

## 2021-06-14 DIAGNOSIS — Z00.00 ANNUAL PHYSICAL EXAM: Primary | ICD-10-CM

## 2021-06-14 DIAGNOSIS — I10 ESSENTIAL HYPERTENSION: ICD-10-CM

## 2021-06-14 DIAGNOSIS — E11.65 TYPE 2 DIABETES MELLITUS WITH HYPERGLYCEMIA, WITHOUT LONG-TERM CURRENT USE OF INSULIN: ICD-10-CM

## 2021-06-14 DIAGNOSIS — R21 RASH: ICD-10-CM

## 2021-06-14 DIAGNOSIS — K21.9 GASTROESOPHAGEAL REFLUX DISEASE: Chronic | ICD-10-CM

## 2021-06-14 DIAGNOSIS — R32 URINARY INCONTINENCE IN FEMALE: ICD-10-CM

## 2021-06-14 DIAGNOSIS — L98.9 SKIN LESION: ICD-10-CM

## 2021-06-14 DIAGNOSIS — L23.9 ALLERGIC DERMATITIS: ICD-10-CM

## 2021-06-14 DIAGNOSIS — Z12.31 ENCOUNTER FOR SCREENING MAMMOGRAM FOR BREAST CANCER: ICD-10-CM

## 2021-06-14 DIAGNOSIS — E78.5 DYSLIPIDEMIA: ICD-10-CM

## 2021-06-14 LAB
ALBUMIN SERPL BCP-MCNC: 4.1 G/DL (ref 3.5–5.2)
ALP SERPL-CCNC: 62 U/L (ref 55–135)
ALT SERPL W/O P-5'-P-CCNC: 14 U/L (ref 10–44)
ANION GAP SERPL CALC-SCNC: 11 MMOL/L (ref 8–16)
AST SERPL-CCNC: 15 U/L (ref 10–40)
BASOPHILS # BLD AUTO: 0.08 K/UL (ref 0–0.2)
BASOPHILS NFR BLD: 1.3 % (ref 0–1.9)
BILIRUB SERPL-MCNC: 0.4 MG/DL (ref 0.1–1)
BUN SERPL-MCNC: 18 MG/DL (ref 8–23)
CALCIUM SERPL-MCNC: 9.9 MG/DL (ref 8.7–10.5)
CHLORIDE SERPL-SCNC: 100 MMOL/L (ref 95–110)
CHOLEST SERPL-MCNC: 175 MG/DL (ref 120–199)
CHOLEST/HDLC SERPL: 2.7 {RATIO} (ref 2–5)
CO2 SERPL-SCNC: 25 MMOL/L (ref 23–29)
CREAT SERPL-MCNC: 1.1 MG/DL (ref 0.5–1.4)
DIFFERENTIAL METHOD: NORMAL
EOSINOPHIL # BLD AUTO: 0.2 K/UL (ref 0–0.5)
EOSINOPHIL NFR BLD: 3.6 % (ref 0–8)
ERYTHROCYTE [DISTWIDTH] IN BLOOD BY AUTOMATED COUNT: 12.6 % (ref 11.5–14.5)
EST. GFR  (AFRICAN AMERICAN): 58.4 ML/MIN/1.73 M^2
EST. GFR  (NON AFRICAN AMERICAN): 50.6 ML/MIN/1.73 M^2
ESTIMATED AVG GLUCOSE: 229 MG/DL (ref 68–131)
GLUCOSE SERPL-MCNC: 216 MG/DL (ref 70–110)
HBA1C MFR BLD: 9.6 % (ref 4–5.6)
HCT VFR BLD AUTO: 38.6 % (ref 37–48.5)
HDLC SERPL-MCNC: 64 MG/DL (ref 40–75)
HDLC SERPL: 36.6 % (ref 20–50)
HGB BLD-MCNC: 12.9 G/DL (ref 12–16)
IMM GRANULOCYTES # BLD AUTO: 0.01 K/UL (ref 0–0.04)
IMM GRANULOCYTES NFR BLD AUTO: 0.2 % (ref 0–0.5)
LDLC SERPL CALC-MCNC: 81.6 MG/DL (ref 63–159)
LYMPHOCYTES # BLD AUTO: 2.8 K/UL (ref 1–4.8)
LYMPHOCYTES NFR BLD: 45.8 % (ref 18–48)
MCH RBC QN AUTO: 30 PG (ref 27–31)
MCHC RBC AUTO-ENTMCNC: 33.4 G/DL (ref 32–36)
MCV RBC AUTO: 90 FL (ref 82–98)
MONOCYTES # BLD AUTO: 0.3 K/UL (ref 0.3–1)
MONOCYTES NFR BLD: 5 % (ref 4–15)
NEUTROPHILS # BLD AUTO: 2.7 K/UL (ref 1.8–7.7)
NEUTROPHILS NFR BLD: 44.1 % (ref 38–73)
NONHDLC SERPL-MCNC: 111 MG/DL
NRBC BLD-RTO: 0 /100 WBC
PLATELET # BLD AUTO: 175 K/UL (ref 150–450)
PMV BLD AUTO: 11.3 FL (ref 9.2–12.9)
POTASSIUM SERPL-SCNC: 4.3 MMOL/L (ref 3.5–5.1)
PROT SERPL-MCNC: 7.3 G/DL (ref 6–8.4)
RBC # BLD AUTO: 4.3 M/UL (ref 4–5.4)
SODIUM SERPL-SCNC: 136 MMOL/L (ref 136–145)
TRIGL SERPL-MCNC: 147 MG/DL (ref 30–150)
TSH SERPL DL<=0.005 MIU/L-ACNC: 1.01 UIU/ML (ref 0.4–4)
WBC # BLD AUTO: 6.05 K/UL (ref 3.9–12.7)

## 2021-06-14 PROCEDURE — 99397 PR PREVENTIVE VISIT,EST,65 & OVER: ICD-10-PCS | Mod: S$GLB,,, | Performed by: FAMILY MEDICINE

## 2021-06-14 PROCEDURE — 1126F PR PAIN SEVERITY QUANTIFIED, NO PAIN PRESENT: ICD-10-PCS | Mod: S$GLB,,, | Performed by: FAMILY MEDICINE

## 2021-06-14 PROCEDURE — 1100F PR PT FALLS ASSESS DOC 2+ FALLS/FALL W/INJURY/YR: ICD-10-PCS | Mod: CPTII,S$GLB,, | Performed by: FAMILY MEDICINE

## 2021-06-14 PROCEDURE — 1100F PTFALLS ASSESS-DOCD GE2>/YR: CPT | Mod: CPTII,S$GLB,, | Performed by: FAMILY MEDICINE

## 2021-06-14 PROCEDURE — 3008F PR BODY MASS INDEX (BMI) DOCUMENTED: ICD-10-PCS | Mod: CPTII,S$GLB,, | Performed by: FAMILY MEDICINE

## 2021-06-14 PROCEDURE — 85025 COMPLETE CBC W/AUTO DIFF WBC: CPT | Performed by: FAMILY MEDICINE

## 2021-06-14 PROCEDURE — 80053 COMPREHEN METABOLIC PANEL: CPT | Performed by: FAMILY MEDICINE

## 2021-06-14 PROCEDURE — 3008F BODY MASS INDEX DOCD: CPT | Mod: CPTII,S$GLB,, | Performed by: FAMILY MEDICINE

## 2021-06-14 PROCEDURE — 80061 LIPID PANEL: CPT | Performed by: FAMILY MEDICINE

## 2021-06-14 PROCEDURE — 3078F DIAST BP <80 MM HG: CPT | Mod: CPTII,S$GLB,, | Performed by: FAMILY MEDICINE

## 2021-06-14 PROCEDURE — 3078F PR MOST RECENT DIASTOLIC BLOOD PRESSURE < 80 MM HG: ICD-10-PCS | Mod: CPTII,S$GLB,, | Performed by: FAMILY MEDICINE

## 2021-06-14 PROCEDURE — 3288F PR FALLS RISK ASSESSMENT DOCUMENTED: ICD-10-PCS | Mod: CPTII,S$GLB,, | Performed by: FAMILY MEDICINE

## 2021-06-14 PROCEDURE — 36415 COLL VENOUS BLD VENIPUNCTURE: CPT | Mod: PO | Performed by: FAMILY MEDICINE

## 2021-06-14 PROCEDURE — 99999 PR PBB SHADOW E&M-EST. PATIENT-LVL IV: ICD-10-PCS | Mod: PBBFAC,,, | Performed by: FAMILY MEDICINE

## 2021-06-14 PROCEDURE — 3075F SYST BP GE 130 - 139MM HG: CPT | Mod: CPTII,S$GLB,, | Performed by: FAMILY MEDICINE

## 2021-06-14 PROCEDURE — 3288F FALL RISK ASSESSMENT DOCD: CPT | Mod: CPTII,S$GLB,, | Performed by: FAMILY MEDICINE

## 2021-06-14 PROCEDURE — 1126F AMNT PAIN NOTED NONE PRSNT: CPT | Mod: S$GLB,,, | Performed by: FAMILY MEDICINE

## 2021-06-14 PROCEDURE — 84443 ASSAY THYROID STIM HORMONE: CPT | Performed by: FAMILY MEDICINE

## 2021-06-14 PROCEDURE — 99999 PR PBB SHADOW E&M-EST. PATIENT-LVL IV: CPT | Mod: PBBFAC,,, | Performed by: FAMILY MEDICINE

## 2021-06-14 PROCEDURE — 3075F PR MOST RECENT SYSTOLIC BLOOD PRESS GE 130-139MM HG: ICD-10-PCS | Mod: CPTII,S$GLB,, | Performed by: FAMILY MEDICINE

## 2021-06-14 PROCEDURE — 99397 PER PM REEVAL EST PAT 65+ YR: CPT | Mod: S$GLB,,, | Performed by: FAMILY MEDICINE

## 2021-06-14 PROCEDURE — 83036 HEMOGLOBIN GLYCOSYLATED A1C: CPT | Performed by: FAMILY MEDICINE

## 2021-06-14 RX ORDER — LOVASTATIN 20 MG/1
20 TABLET ORAL NIGHTLY
Qty: 90 TABLET | Refills: 3 | Status: SHIPPED | OUTPATIENT
Start: 2021-06-14

## 2021-06-14 RX ORDER — ENALAPRIL MALEATE 10 MG/1
10 TABLET ORAL DAILY
Qty: 90 TABLET | Refills: 3 | Status: SHIPPED | OUTPATIENT
Start: 2021-06-14 | End: 2022-06-14

## 2021-06-14 RX ORDER — AMLODIPINE BESYLATE 5 MG/1
5 TABLET ORAL DAILY
Qty: 90 TABLET | Refills: 3 | Status: ON HOLD | OUTPATIENT
Start: 2021-06-14 | End: 2021-07-27 | Stop reason: SDUPTHER

## 2021-06-14 RX ORDER — OMEPRAZOLE 20 MG/1
20 CAPSULE, DELAYED RELEASE ORAL
Qty: 90 CAPSULE | Refills: 3 | Status: SHIPPED | OUTPATIENT
Start: 2021-06-14 | End: 2021-07-14

## 2021-06-14 RX ORDER — HYDROCHLOROTHIAZIDE 25 MG/1
25 TABLET ORAL DAILY
Qty: 90 TABLET | Refills: 3 | Status: SHIPPED | OUTPATIENT
Start: 2021-06-14 | End: 2022-06-14

## 2021-06-14 RX ORDER — TRIAMCINOLONE ACETONIDE 1 MG/G
CREAM TOPICAL
Qty: 80 G | Refills: 1 | Status: SHIPPED | OUTPATIENT
Start: 2021-06-14

## 2021-06-14 RX ORDER — METFORMIN HYDROCHLORIDE 500 MG/1
500 TABLET ORAL 2 TIMES DAILY WITH MEALS
Qty: 180 TABLET | Refills: 3 | Status: SHIPPED | OUTPATIENT
Start: 2021-06-14 | End: 2021-08-05

## 2021-06-14 RX ORDER — HYDROXYZINE HYDROCHLORIDE 25 MG/1
25 TABLET, FILM COATED ORAL 3 TIMES DAILY PRN
Qty: 90 TABLET | Refills: 0 | Status: SHIPPED | OUTPATIENT
Start: 2021-06-14

## 2021-06-15 ENCOUNTER — OFFICE VISIT (OUTPATIENT)
Dept: OPTOMETRY | Facility: CLINIC | Age: 71
End: 2021-06-15
Payer: MEDICARE

## 2021-06-15 DIAGNOSIS — E11.9 TYPE 2 DIABETES MELLITUS WITHOUT RETINOPATHY: Primary | ICD-10-CM

## 2021-06-15 DIAGNOSIS — H02.823 EYELID CYST, RIGHT: ICD-10-CM

## 2021-06-15 DIAGNOSIS — Z96.1 PSEUDOPHAKIA OF BOTH EYES: ICD-10-CM

## 2021-06-15 DIAGNOSIS — H52.7 REFRACTIVE ERROR: ICD-10-CM

## 2021-06-15 PROCEDURE — 3046F PR MOST RECENT HEMOGLOBIN A1C LEVEL > 9.0%: ICD-10-PCS | Mod: CPTII,S$GLB,, | Performed by: OPTOMETRIST

## 2021-06-15 PROCEDURE — 92014 PR EYE EXAM, EST PATIENT,COMPREHESV: ICD-10-PCS | Mod: S$GLB,,, | Performed by: OPTOMETRIST

## 2021-06-15 PROCEDURE — 2023F PR DILATED RETINAL EXAM W/O EVID OF RETINOPATHY: ICD-10-PCS | Mod: S$GLB,,, | Performed by: OPTOMETRIST

## 2021-06-15 PROCEDURE — 1126F PR PAIN SEVERITY QUANTIFIED, NO PAIN PRESENT: ICD-10-PCS | Mod: S$GLB,,, | Performed by: OPTOMETRIST

## 2021-06-15 PROCEDURE — 92015 DETERMINE REFRACTIVE STATE: CPT | Mod: S$GLB,,, | Performed by: OPTOMETRIST

## 2021-06-15 PROCEDURE — 1126F AMNT PAIN NOTED NONE PRSNT: CPT | Mod: S$GLB,,, | Performed by: OPTOMETRIST

## 2021-06-15 PROCEDURE — 2023F DILAT RTA XM W/O RTNOPTHY: CPT | Mod: S$GLB,,, | Performed by: OPTOMETRIST

## 2021-06-15 PROCEDURE — 3288F FALL RISK ASSESSMENT DOCD: CPT | Mod: CPTII,S$GLB,, | Performed by: OPTOMETRIST

## 2021-06-15 PROCEDURE — 3046F HEMOGLOBIN A1C LEVEL >9.0%: CPT | Mod: CPTII,S$GLB,, | Performed by: OPTOMETRIST

## 2021-06-15 PROCEDURE — 3288F PR FALLS RISK ASSESSMENT DOCUMENTED: ICD-10-PCS | Mod: CPTII,S$GLB,, | Performed by: OPTOMETRIST

## 2021-06-15 PROCEDURE — 99999 PR PBB SHADOW E&M-EST. PATIENT-LVL III: CPT | Mod: PBBFAC,,, | Performed by: OPTOMETRIST

## 2021-06-15 PROCEDURE — 92014 COMPRE OPH EXAM EST PT 1/>: CPT | Mod: S$GLB,,, | Performed by: OPTOMETRIST

## 2021-06-15 PROCEDURE — 1100F PR PT FALLS ASSESS DOC 2+ FALLS/FALL W/INJURY/YR: ICD-10-PCS | Mod: CPTII,S$GLB,, | Performed by: OPTOMETRIST

## 2021-06-15 PROCEDURE — 99999 PR PBB SHADOW E&M-EST. PATIENT-LVL III: ICD-10-PCS | Mod: PBBFAC,,, | Performed by: OPTOMETRIST

## 2021-06-15 PROCEDURE — 1100F PTFALLS ASSESS-DOCD GE2>/YR: CPT | Mod: CPTII,S$GLB,, | Performed by: OPTOMETRIST

## 2021-06-15 PROCEDURE — 92015 PR REFRACTION: ICD-10-PCS | Mod: S$GLB,,, | Performed by: OPTOMETRIST

## 2021-06-18 ENCOUNTER — TELEPHONE (OUTPATIENT)
Dept: FAMILY MEDICINE | Facility: CLINIC | Age: 71
End: 2021-06-18

## 2021-06-18 DIAGNOSIS — Z12.11 SCREEN FOR COLON CANCER: Primary | ICD-10-CM

## 2021-06-21 ENCOUNTER — TELEPHONE (OUTPATIENT)
Dept: ADMINISTRATIVE | Facility: OTHER | Age: 71
End: 2021-06-21

## 2021-06-22 ENCOUNTER — TELEPHONE (OUTPATIENT)
Dept: ADMINISTRATIVE | Facility: OTHER | Age: 71
End: 2021-06-22

## 2021-07-08 ENCOUNTER — ANESTHESIA EVENT (OUTPATIENT)
Dept: SURGERY | Facility: HOSPITAL | Age: 71
DRG: 522 | End: 2021-07-08
Payer: MEDICARE

## 2021-07-08 ENCOUNTER — HOSPITAL ENCOUNTER (INPATIENT)
Facility: HOSPITAL | Age: 71
LOS: 5 days | Discharge: NURSING, REHAB OR LONG-TERM CARE | DRG: 522 | End: 2021-07-14
Attending: EMERGENCY MEDICINE | Admitting: FAMILY MEDICINE
Payer: MEDICARE

## 2021-07-08 DIAGNOSIS — R52 PAIN: ICD-10-CM

## 2021-07-08 DIAGNOSIS — S72.001A CLOSED DISPLACED FRACTURE OF RIGHT FEMORAL NECK: Primary | ICD-10-CM

## 2021-07-08 DIAGNOSIS — Z01.818 PREOP EXAMINATION: ICD-10-CM
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
## 2021-07-08 PROBLEM — E11.65 UNCONTROLLED TYPE 2 DIABETES MELLITUS WITH HYPERGLYCEMIA: Status: ACTIVE | Noted: 2021-07-08

## 2021-07-08 LAB
ABO + RH BLD: NORMAL
ANION GAP SERPL CALC-SCNC: 13 MMOL/L (ref 8–16)
BASOPHILS # BLD AUTO: 0.05 K/UL (ref 0–0.2)
BASOPHILS NFR BLD: 0.8 % (ref 0–1.9)
BLD GP AB SCN CELLS X3 SERPL QL: NORMAL
BUN SERPL-MCNC: 14 MG/DL (ref 8–23)
CALCIUM SERPL-MCNC: 9.5 MG/DL (ref 8.7–10.5)
CHLORIDE SERPL-SCNC: 100 MMOL/L (ref 95–110)
CO2 SERPL-SCNC: 23 MMOL/L (ref 23–29)
CREAT SERPL-MCNC: 1.1 MG/DL (ref 0.5–1.4)
CTP QC/QA: YES
DIFFERENTIAL METHOD: ABNORMAL
EOSINOPHIL # BLD AUTO: 0.1 K/UL (ref 0–0.5)
EOSINOPHIL NFR BLD: 1.7 % (ref 0–8)
ERYTHROCYTE [DISTWIDTH] IN BLOOD BY AUTOMATED COUNT: 12.4 % (ref 11.5–14.5)
EST. GFR  (AFRICAN AMERICAN): 58 ML/MIN/1.73 M^2
EST. GFR  (NON AFRICAN AMERICAN): 51 ML/MIN/1.73 M^2
GLUCOSE SERPL-MCNC: 348 MG/DL (ref 70–110)
HCT VFR BLD AUTO: 40.3 % (ref 37–48.5)
HGB BLD-MCNC: 13.9 G/DL (ref 12–16)
IMM GRANULOCYTES # BLD AUTO: 0.04 K/UL (ref 0–0.04)
IMM GRANULOCYTES NFR BLD AUTO: 0.7 % (ref 0–0.5)
LYMPHOCYTES # BLD AUTO: 1.8 K/UL (ref 1–4.8)
LYMPHOCYTES NFR BLD: 29.4 % (ref 18–48)
MCH RBC QN AUTO: 30.1 PG (ref 27–31)
MCHC RBC AUTO-ENTMCNC: 34.5 G/DL (ref 32–36)
MCV RBC AUTO: 87 FL (ref 82–98)
MONOCYTES # BLD AUTO: 0.2 K/UL (ref 0.3–1)
MONOCYTES NFR BLD: 4 % (ref 4–15)
NEUTROPHILS # BLD AUTO: 3.8 K/UL (ref 1.8–7.7)
NEUTROPHILS NFR BLD: 63.4 % (ref 38–73)
NRBC BLD-RTO: 0 /100 WBC
PLATELET # BLD AUTO: 154 K/UL (ref 150–450)
PMV BLD AUTO: 11.2 FL (ref 9.2–12.9)
POCT GLUCOSE: 202 MG/DL (ref 70–110)
POCT GLUCOSE: 315 MG/DL (ref 70–110)
POTASSIUM SERPL-SCNC: 5.3 MMOL/L (ref 3.5–5.1)
RBC # BLD AUTO: 4.62 M/UL (ref 4–5.4)
SARS-COV-2 RDRP RESP QL NAA+PROBE: NEGATIVE
SODIUM SERPL-SCNC: 136 MMOL/L (ref 136–145)
WBC # BLD AUTO: 6.02 K/UL (ref 3.9–12.7)

## 2021-07-08 PROCEDURE — 96375 TX/PRO/DX INJ NEW DRUG ADDON: CPT

## 2021-07-08 PROCEDURE — 80048 BASIC METABOLIC PNL TOTAL CA: CPT | Performed by: EMERGENCY MEDICINE

## 2021-07-08 PROCEDURE — G0378 HOSPITAL OBSERVATION PER HR: HCPCS

## 2021-07-08 PROCEDURE — 63600175 PHARM REV CODE 636 W HCPCS: Performed by: EMERGENCY MEDICINE

## 2021-07-08 PROCEDURE — 25000003 PHARM REV CODE 250: Performed by: STUDENT IN AN ORGANIZED HEALTH CARE EDUCATION/TRAINING PROGRAM

## 2021-07-08 PROCEDURE — C9399 UNCLASSIFIED DRUGS OR BIOLOG: HCPCS | Performed by: STUDENT IN AN ORGANIZED HEALTH CARE EDUCATION/TRAINING PROGRAM

## 2021-07-08 PROCEDURE — 99285 EMERGENCY DEPT VISIT HI MDM: CPT | Mod: 25

## 2021-07-08 PROCEDURE — 85025 COMPLETE CBC W/AUTO DIFF WBC: CPT | Performed by: EMERGENCY MEDICINE

## 2021-07-08 PROCEDURE — 63600175 PHARM REV CODE 636 W HCPCS: Performed by: STUDENT IN AN ORGANIZED HEALTH CARE EDUCATION/TRAINING PROGRAM

## 2021-07-08 PROCEDURE — 25000003 PHARM REV CODE 250: Performed by: EMERGENCY MEDICINE

## 2021-07-08 PROCEDURE — U0002 COVID-19 LAB TEST NON-CDC: HCPCS | Performed by: FAMILY MEDICINE

## 2021-07-08 PROCEDURE — 82962 GLUCOSE BLOOD TEST: CPT

## 2021-07-08 PROCEDURE — 86900 BLOOD TYPING SEROLOGIC ABO: CPT | Performed by: STUDENT IN AN ORGANIZED HEALTH CARE EDUCATION/TRAINING PROGRAM

## 2021-07-08 PROCEDURE — 96372 THER/PROPH/DIAG INJ SC/IM: CPT | Mod: 59

## 2021-07-08 PROCEDURE — 96361 HYDRATE IV INFUSION ADD-ON: CPT

## 2021-07-08 PROCEDURE — 96374 THER/PROPH/DIAG INJ IV PUSH: CPT

## 2021-07-08 PROCEDURE — 36415 COLL VENOUS BLD VENIPUNCTURE: CPT | Performed by: STUDENT IN AN ORGANIZED HEALTH CARE EDUCATION/TRAINING PROGRAM

## 2021-07-08 RX ORDER — LABETALOL HYDROCHLORIDE 5 MG/ML
10 INJECTION, SOLUTION INTRAVENOUS EVERY 6 HOURS PRN
Status: DISCONTINUED | OUTPATIENT
Start: 2021-07-08 | End: 2021-07-14 | Stop reason: HOSPADM

## 2021-07-08 RX ORDER — HYDROCHLOROTHIAZIDE 25 MG/1
25 TABLET ORAL DAILY
Status: DISCONTINUED | OUTPATIENT
Start: 2021-07-09 | End: 2021-07-14 | Stop reason: HOSPADM

## 2021-07-08 RX ORDER — HYDROMORPHONE HYDROCHLORIDE 1 MG/ML
0.5 INJECTION, SOLUTION INTRAMUSCULAR; INTRAVENOUS; SUBCUTANEOUS
Status: COMPLETED | OUTPATIENT
Start: 2021-07-08 | End: 2021-07-08

## 2021-07-08 RX ORDER — FENTANYL CITRATE 50 UG/ML
100 INJECTION, SOLUTION INTRAMUSCULAR; INTRAVENOUS
Status: COMPLETED | OUTPATIENT
Start: 2021-07-08 | End: 2021-07-08

## 2021-07-08 RX ORDER — HYDROCODONE BITARTRATE AND ACETAMINOPHEN 5; 325 MG/1; MG/1
1 TABLET ORAL EVERY 4 HOURS PRN
Status: DISCONTINUED | OUTPATIENT
Start: 2021-07-08 | End: 2021-07-14 | Stop reason: HOSPADM

## 2021-07-08 RX ORDER — ONDANSETRON 2 MG/ML
4 INJECTION INTRAMUSCULAR; INTRAVENOUS
Status: COMPLETED | OUTPATIENT
Start: 2021-07-08 | End: 2021-07-08

## 2021-07-08 RX ORDER — AMLODIPINE BESYLATE 5 MG/1
5 TABLET ORAL NIGHTLY
Status: DISCONTINUED | OUTPATIENT
Start: 2021-07-08 | End: 2021-07-14 | Stop reason: HOSPADM

## 2021-07-08 RX ORDER — SODIUM CHLORIDE 0.9 % (FLUSH) 0.9 %
10 SYRINGE (ML) INJECTION
Status: DISCONTINUED | OUTPATIENT
Start: 2021-07-08 | End: 2021-07-14 | Stop reason: HOSPADM

## 2021-07-08 RX ORDER — ACETAMINOPHEN 325 MG/1
650 TABLET ORAL EVERY 8 HOURS PRN
Status: DISCONTINUED | OUTPATIENT
Start: 2021-07-08 | End: 2021-07-14 | Stop reason: HOSPADM

## 2021-07-08 RX ORDER — ONDANSETRON 2 MG/ML
4 INJECTION INTRAMUSCULAR; INTRAVENOUS EVERY 8 HOURS PRN
Status: DISCONTINUED | OUTPATIENT
Start: 2021-07-08 | End: 2021-07-14 | Stop reason: HOSPADM

## 2021-07-08 RX ORDER — HYDROMORPHONE HYDROCHLORIDE 1 MG/ML
0.5 INJECTION, SOLUTION INTRAMUSCULAR; INTRAVENOUS; SUBCUTANEOUS EVERY 4 HOURS PRN
Status: DISCONTINUED | OUTPATIENT
Start: 2021-07-08 | End: 2021-07-14 | Stop reason: HOSPADM

## 2021-07-08 RX ORDER — GLUCAGON 1 MG
1 KIT INJECTION
Status: DISCONTINUED | OUTPATIENT
Start: 2021-07-08 | End: 2021-07-14 | Stop reason: HOSPADM

## 2021-07-08 RX ORDER — AMLODIPINE BESYLATE 5 MG/1
5 TABLET ORAL DAILY
Status: DISCONTINUED | OUTPATIENT
Start: 2021-07-09 | End: 2021-07-08

## 2021-07-08 RX ORDER — GLIMEPIRIDE 4 MG/1
5 TABLET ORAL
COMMUNITY
End: 2021-08-10 | Stop reason: SDUPTHER

## 2021-07-08 RX ORDER — INSULIN ASPART 100 [IU]/ML
1-10 INJECTION, SOLUTION INTRAVENOUS; SUBCUTANEOUS EVERY 6 HOURS PRN
Status: DISCONTINUED | OUTPATIENT
Start: 2021-07-08 | End: 2021-07-14 | Stop reason: HOSPADM

## 2021-07-08 RX ORDER — ATORVASTATIN CALCIUM 20 MG/1
20 TABLET, FILM COATED ORAL NIGHTLY
Status: DISCONTINUED | OUTPATIENT
Start: 2021-07-08 | End: 2021-07-14 | Stop reason: HOSPADM

## 2021-07-08 RX ORDER — TALC
6 POWDER (GRAM) TOPICAL NIGHTLY PRN
Status: DISCONTINUED | OUTPATIENT
Start: 2021-07-08 | End: 2021-07-09

## 2021-07-08 RX ADMIN — HYDROCODONE BITARTRATE AND ACETAMINOPHEN 1 TABLET: 5; 325 TABLET ORAL at 10:07

## 2021-07-08 RX ADMIN — FENTANYL CITRATE 100 MCG: 50 INJECTION, SOLUTION INTRAMUSCULAR; INTRAVENOUS at 02:07

## 2021-07-08 RX ADMIN — INSULIN DETEMIR 6 UNITS: 100 INJECTION, SOLUTION SUBCUTANEOUS at 10:07

## 2021-07-08 RX ADMIN — ONDANSETRON 4 MG: 2 INJECTION INTRAMUSCULAR; INTRAVENOUS at 03:07

## 2021-07-08 RX ADMIN — ATORVASTATIN CALCIUM 20 MG: 20 TABLET, FILM COATED ORAL at 10:07

## 2021-07-08 RX ADMIN — AMLODIPINE BESYLATE 5 MG: 5 TABLET ORAL at 10:07

## 2021-07-08 RX ADMIN — Medication 6 MG: at 10:07

## 2021-07-08 RX ADMIN — HYDROMORPHONE HYDROCHLORIDE 0.5 MG: 1 INJECTION, SOLUTION INTRAMUSCULAR; INTRAVENOUS; SUBCUTANEOUS at 03:07

## 2021-07-08 RX ADMIN — SODIUM CHLORIDE 1000 ML: 0.9 INJECTION, SOLUTION INTRAVENOUS at 03:07

## 2021-07-08 RX ADMIN — INSULIN ASPART 5 UNITS: 100 INJECTION, SOLUTION INTRAVENOUS; SUBCUTANEOUS at 10:07

## 2021-07-09 ENCOUNTER — ANESTHESIA (OUTPATIENT)
Dept: SURGERY | Facility: HOSPITAL | Age: 71
DRG: 522 | End: 2021-07-09
Payer: MEDICARE

## 2021-07-09 ENCOUNTER — TELEPHONE (OUTPATIENT)
Dept: FAMILY MEDICINE | Facility: CLINIC | Age: 71
End: 2021-07-09

## 2021-07-09 PROBLEM — M80.051A: Status: ACTIVE | Noted: 2021-07-09

## 2021-07-09 LAB
ALBUMIN SERPL BCP-MCNC: 3.6 G/DL (ref 3.5–5.2)
ALP SERPL-CCNC: 57 U/L (ref 55–135)
ALT SERPL W/O P-5'-P-CCNC: 12 U/L (ref 10–44)
ANION GAP SERPL CALC-SCNC: 11 MMOL/L (ref 8–16)
AST SERPL-CCNC: 16 U/L (ref 10–40)
BASOPHILS # BLD AUTO: 0.05 K/UL (ref 0–0.2)
BASOPHILS NFR BLD: 0.7 % (ref 0–1.9)
BILIRUB SERPL-MCNC: 0.5 MG/DL (ref 0.1–1)
BUN SERPL-MCNC: 14 MG/DL (ref 8–23)
CALCIUM SERPL-MCNC: 8.8 MG/DL (ref 8.7–10.5)
CHLORIDE SERPL-SCNC: 102 MMOL/L (ref 95–110)
CO2 SERPL-SCNC: 24 MMOL/L (ref 23–29)
CREAT SERPL-MCNC: 1.1 MG/DL (ref 0.5–1.4)
DIFFERENTIAL METHOD: ABNORMAL
EOSINOPHIL # BLD AUTO: 0.1 K/UL (ref 0–0.5)
EOSINOPHIL NFR BLD: 1.8 % (ref 0–8)
ERYTHROCYTE [DISTWIDTH] IN BLOOD BY AUTOMATED COUNT: 12.5 % (ref 11.5–14.5)
EST. GFR  (AFRICAN AMERICAN): 58 ML/MIN/1.73 M^2
EST. GFR  (NON AFRICAN AMERICAN): 51 ML/MIN/1.73 M^2
ESTIMATED AVG GLUCOSE: 226 MG/DL (ref 68–131)
GLUCOSE SERPL-MCNC: 321 MG/DL (ref 70–110)
HBA1C MFR BLD: 9.5 % (ref 4–5.6)
HCT VFR BLD AUTO: 36.8 % (ref 37–48.5)
HGB BLD-MCNC: 12.6 G/DL (ref 12–16)
IMM GRANULOCYTES # BLD AUTO: 0.02 K/UL (ref 0–0.04)
IMM GRANULOCYTES NFR BLD AUTO: 0.3 % (ref 0–0.5)
LYMPHOCYTES # BLD AUTO: 1.7 K/UL (ref 1–4.8)
LYMPHOCYTES NFR BLD: 23.2 % (ref 18–48)
MAGNESIUM SERPL-MCNC: 1.6 MG/DL (ref 1.6–2.6)
MCH RBC QN AUTO: 29.9 PG (ref 27–31)
MCHC RBC AUTO-ENTMCNC: 34.2 G/DL (ref 32–36)
MCV RBC AUTO: 87 FL (ref 82–98)
MONOCYTES # BLD AUTO: 0.4 K/UL (ref 0.3–1)
MONOCYTES NFR BLD: 5 % (ref 4–15)
NEUTROPHILS # BLD AUTO: 5 K/UL (ref 1.8–7.7)
NEUTROPHILS NFR BLD: 69 % (ref 38–73)
NRBC BLD-RTO: 0 /100 WBC
PHOSPHATE SERPL-MCNC: 4.4 MG/DL (ref 2.7–4.5)
PLATELET # BLD AUTO: 147 K/UL (ref 150–450)
PMV BLD AUTO: 11.6 FL (ref 9.2–12.9)
POCT GLUCOSE: 161 MG/DL (ref 70–110)
POCT GLUCOSE: 244 MG/DL (ref 70–110)
POCT GLUCOSE: 282 MG/DL (ref 70–110)
POCT GLUCOSE: 321 MG/DL (ref 70–110)
POCT GLUCOSE: 340 MG/DL (ref 70–110)
POCT GLUCOSE: 368 MG/DL (ref 70–110)
POTASSIUM SERPL-SCNC: 4 MMOL/L (ref 3.5–5.1)
PROT SERPL-MCNC: 6.7 G/DL (ref 6–8.4)
RBC # BLD AUTO: 4.21 M/UL (ref 4–5.4)
SODIUM SERPL-SCNC: 137 MMOL/L (ref 136–145)
T4 FREE SERPL-MCNC: 1.02 NG/DL (ref 0.71–1.51)
TSH SERPL DL<=0.005 MIU/L-ACNC: 0.23 UIU/ML (ref 0.4–4)
WBC # BLD AUTO: 7.24 K/UL (ref 3.9–12.7)

## 2021-07-09 PROCEDURE — 25000003 PHARM REV CODE 250: Performed by: STUDENT IN AN ORGANIZED HEALTH CARE EDUCATION/TRAINING PROGRAM

## 2021-07-09 PROCEDURE — 84100 ASSAY OF PHOSPHORUS: CPT | Performed by: STUDENT IN AN ORGANIZED HEALTH CARE EDUCATION/TRAINING PROGRAM

## 2021-07-09 PROCEDURE — 71000039 HC RECOVERY, EACH ADD'L HOUR: Performed by: ORTHOPAEDIC SURGERY

## 2021-07-09 PROCEDURE — 63600175 PHARM REV CODE 636 W HCPCS: Performed by: STUDENT IN AN ORGANIZED HEALTH CARE EDUCATION/TRAINING PROGRAM

## 2021-07-09 PROCEDURE — 85025 COMPLETE CBC W/AUTO DIFF WBC: CPT | Performed by: STUDENT IN AN ORGANIZED HEALTH CARE EDUCATION/TRAINING PROGRAM

## 2021-07-09 PROCEDURE — 36000711: Performed by: ORTHOPAEDIC SURGERY

## 2021-07-09 PROCEDURE — 27236 TREAT THIGH FRACTURE: CPT | Mod: RT,,, | Performed by: ORTHOPAEDIC SURGERY

## 2021-07-09 PROCEDURE — 25000003 PHARM REV CODE 250: Performed by: NURSE ANESTHETIST, CERTIFIED REGISTERED

## 2021-07-09 PROCEDURE — 83036 HEMOGLOBIN GLYCOSYLATED A1C: CPT | Performed by: STUDENT IN AN ORGANIZED HEALTH CARE EDUCATION/TRAINING PROGRAM

## 2021-07-09 PROCEDURE — C1776 JOINT DEVICE (IMPLANTABLE): HCPCS | Performed by: ORTHOPAEDIC SURGERY

## 2021-07-09 PROCEDURE — 80053 COMPREHEN METABOLIC PANEL: CPT | Performed by: STUDENT IN AN ORGANIZED HEALTH CARE EDUCATION/TRAINING PROGRAM

## 2021-07-09 PROCEDURE — 88311 PR  DECALCIFY TISSUE: ICD-10-PCS | Mod: 26,,, | Performed by: PATHOLOGY

## 2021-07-09 PROCEDURE — 36415 COLL VENOUS BLD VENIPUNCTURE: CPT | Performed by: STUDENT IN AN ORGANIZED HEALTH CARE EDUCATION/TRAINING PROGRAM

## 2021-07-09 PROCEDURE — 63600175 PHARM REV CODE 636 W HCPCS: Performed by: NURSE ANESTHETIST, CERTIFIED REGISTERED

## 2021-07-09 PROCEDURE — 36000710: Performed by: ORTHOPAEDIC SURGERY

## 2021-07-09 PROCEDURE — 88305 TISSUE EXAM BY PATHOLOGIST: CPT | Mod: 26,,, | Performed by: PATHOLOGY

## 2021-07-09 PROCEDURE — 83735 ASSAY OF MAGNESIUM: CPT | Performed by: STUDENT IN AN ORGANIZED HEALTH CARE EDUCATION/TRAINING PROGRAM

## 2021-07-09 PROCEDURE — 94761 N-INVAS EAR/PLS OXIMETRY MLT: CPT

## 2021-07-09 PROCEDURE — 96372 THER/PROPH/DIAG INJ SC/IM: CPT | Mod: 59

## 2021-07-09 PROCEDURE — 37000009 HC ANESTHESIA EA ADD 15 MINS: Performed by: ORTHOPAEDIC SURGERY

## 2021-07-09 PROCEDURE — 71000033 HC RECOVERY, INTIAL HOUR: Performed by: ORTHOPAEDIC SURGERY

## 2021-07-09 PROCEDURE — 27201423 OPTIME MED/SURG SUP & DEVICES STERILE SUPPLY: Performed by: ORTHOPAEDIC SURGERY

## 2021-07-09 PROCEDURE — C1729 CATH, DRAINAGE: HCPCS | Performed by: ORTHOPAEDIC SURGERY

## 2021-07-09 PROCEDURE — 27236 PR FEMORAL FX, OPEN TX: ICD-10-PCS | Mod: RT,,, | Performed by: ORTHOPAEDIC SURGERY

## 2021-07-09 PROCEDURE — 25000003 PHARM REV CODE 250: Performed by: ORTHOPAEDIC SURGERY

## 2021-07-09 PROCEDURE — 88311 DECALCIFY TISSUE: CPT | Performed by: PATHOLOGY

## 2021-07-09 PROCEDURE — 84439 ASSAY OF FREE THYROXINE: CPT | Performed by: STUDENT IN AN ORGANIZED HEALTH CARE EDUCATION/TRAINING PROGRAM

## 2021-07-09 PROCEDURE — 84443 ASSAY THYROID STIM HORMONE: CPT | Performed by: STUDENT IN AN ORGANIZED HEALTH CARE EDUCATION/TRAINING PROGRAM

## 2021-07-09 PROCEDURE — 37000008 HC ANESTHESIA 1ST 15 MINUTES: Performed by: ORTHOPAEDIC SURGERY

## 2021-07-09 PROCEDURE — 88311 DECALCIFY TISSUE: CPT | Mod: 26,,, | Performed by: PATHOLOGY

## 2021-07-09 PROCEDURE — 88305 TISSUE EXAM BY PATHOLOGIST: CPT | Performed by: PATHOLOGY

## 2021-07-09 PROCEDURE — 88305 TISSUE EXAM BY PATHOLOGIST: ICD-10-PCS | Mod: 26,,, | Performed by: PATHOLOGY

## 2021-07-09 PROCEDURE — 11000001 HC ACUTE MED/SURG PRIVATE ROOM

## 2021-07-09 DEVICE — IMPLANTABLE DEVICE: Type: IMPLANTABLE DEVICE | Site: HIP | Status: FUNCTIONAL

## 2021-07-09 DEVICE — STEM FEM HIP ACCOLADE #4 132 D: Type: IMPLANTABLE DEVICE | Site: HIP | Status: FUNCTIONAL

## 2021-07-09 DEVICE — HEAD UNITRAX MODULAR ENDO 46MM: Type: IMPLANTABLE DEVICE | Site: HIP | Status: FUNCTIONAL

## 2021-07-09 RX ORDER — SODIUM CHLORIDE 9 MG/ML
INJECTION, SOLUTION INTRAVENOUS CONTINUOUS
Status: DISCONTINUED | OUTPATIENT
Start: 2021-07-09 | End: 2021-07-11

## 2021-07-09 RX ORDER — FENTANYL CITRATE 50 UG/ML
INJECTION, SOLUTION INTRAMUSCULAR; INTRAVENOUS
Status: DISCONTINUED | OUTPATIENT
Start: 2021-07-09 | End: 2021-07-09

## 2021-07-09 RX ORDER — SODIUM CHLORIDE 0.9 % (FLUSH) 0.9 %
10 SYRINGE (ML) INJECTION
Status: DISCONTINUED | OUTPATIENT
Start: 2021-07-09 | End: 2021-07-14 | Stop reason: HOSPADM

## 2021-07-09 RX ORDER — ONDANSETRON 2 MG/ML
4 INJECTION INTRAMUSCULAR; INTRAVENOUS ONCE AS NEEDED
Status: DISCONTINUED | OUTPATIENT
Start: 2021-07-09 | End: 2021-07-14 | Stop reason: HOSPADM

## 2021-07-09 RX ORDER — HYDROMORPHONE HYDROCHLORIDE 2 MG/ML
0.5 INJECTION, SOLUTION INTRAMUSCULAR; INTRAVENOUS; SUBCUTANEOUS EVERY 5 MIN PRN
Status: DISCONTINUED | OUTPATIENT
Start: 2021-07-09 | End: 2021-07-14 | Stop reason: HOSPADM

## 2021-07-09 RX ORDER — ONDANSETRON 2 MG/ML
INJECTION INTRAMUSCULAR; INTRAVENOUS
Status: DISCONTINUED | OUTPATIENT
Start: 2021-07-09 | End: 2021-07-09

## 2021-07-09 RX ORDER — DEXAMETHASONE SODIUM PHOSPHATE 4 MG/ML
INJECTION, SOLUTION INTRA-ARTICULAR; INTRALESIONAL; INTRAMUSCULAR; INTRAVENOUS; SOFT TISSUE
Status: DISCONTINUED | OUTPATIENT
Start: 2021-07-09 | End: 2021-07-09

## 2021-07-09 RX ORDER — INSULIN ASPART 100 [IU]/ML
3 INJECTION, SOLUTION INTRAVENOUS; SUBCUTANEOUS ONCE
Status: COMPLETED | OUTPATIENT
Start: 2021-07-09 | End: 2021-07-09

## 2021-07-09 RX ORDER — BUPIVACAINE HYDROCHLORIDE 2.5 MG/ML
INJECTION, SOLUTION EPIDURAL; INFILTRATION; INTRACAUDAL
Status: DISCONTINUED | OUTPATIENT
Start: 2021-07-09 | End: 2021-07-09 | Stop reason: HOSPADM

## 2021-07-09 RX ORDER — EPHEDRINE SULFATE 50 MG/ML
INJECTION, SOLUTION INTRAVENOUS
Status: DISCONTINUED | OUTPATIENT
Start: 2021-07-09 | End: 2021-07-09

## 2021-07-09 RX ORDER — DIAZEPAM 2 MG/1
2 TABLET ORAL ONCE
Status: COMPLETED | OUTPATIENT
Start: 2021-07-09 | End: 2021-07-09

## 2021-07-09 RX ORDER — PROPOFOL 10 MG/ML
VIAL (ML) INTRAVENOUS
Status: DISCONTINUED | OUTPATIENT
Start: 2021-07-09 | End: 2021-07-09

## 2021-07-09 RX ORDER — CEFAZOLIN SODIUM 2 G/50ML
2 SOLUTION INTRAVENOUS
Status: COMPLETED | OUTPATIENT
Start: 2021-07-09 | End: 2021-07-10

## 2021-07-09 RX ORDER — POVIDONE-IODINE 10 %
SOLUTION, NON-ORAL TOPICAL
Status: DISCONTINUED | OUTPATIENT
Start: 2021-07-09 | End: 2021-07-09 | Stop reason: HOSPADM

## 2021-07-09 RX ORDER — LIDOCAINE HYDROCHLORIDE 20 MG/ML
INJECTION INTRAVENOUS
Status: DISCONTINUED | OUTPATIENT
Start: 2021-07-09 | End: 2021-07-09

## 2021-07-09 RX ORDER — TRANEXAMIC ACID 100 MG/ML
INJECTION, SOLUTION INTRAVENOUS
Status: DISCONTINUED | OUTPATIENT
Start: 2021-07-09 | End: 2021-07-09

## 2021-07-09 RX ORDER — SUCCINYLCHOLINE CHLORIDE 20 MG/ML
INJECTION INTRAMUSCULAR; INTRAVENOUS
Status: DISCONTINUED | OUTPATIENT
Start: 2021-07-09 | End: 2021-07-09

## 2021-07-09 RX ORDER — ACETAMINOPHEN 10 MG/ML
INJECTION, SOLUTION INTRAVENOUS
Status: DISCONTINUED | OUTPATIENT
Start: 2021-07-09 | End: 2021-07-09

## 2021-07-09 RX ORDER — TALC
9 POWDER (GRAM) TOPICAL NIGHTLY PRN
Status: DISCONTINUED | OUTPATIENT
Start: 2021-07-09 | End: 2021-07-14 | Stop reason: HOSPADM

## 2021-07-09 RX ADMIN — HYDROCODONE BITARTRATE AND ACETAMINOPHEN 1 TABLET: 5; 325 TABLET ORAL at 08:07

## 2021-07-09 RX ADMIN — HYDROCHLOROTHIAZIDE 25 MG: 25 TABLET ORAL at 08:07

## 2021-07-09 RX ADMIN — HYDROCODONE BITARTRATE AND ACETAMINOPHEN 1 TABLET: 5; 325 TABLET ORAL at 07:07

## 2021-07-09 RX ADMIN — FENTANYL CITRATE 50 MCG: 50 INJECTION, SOLUTION INTRAMUSCULAR; INTRAVENOUS at 02:07

## 2021-07-09 RX ADMIN — AMLODIPINE BESYLATE 5 MG: 5 TABLET ORAL at 09:07

## 2021-07-09 RX ADMIN — EPHEDRINE SULFATE 15 MG: 50 INJECTION, SOLUTION INTRAMUSCULAR; INTRAVENOUS; SUBCUTANEOUS at 01:07

## 2021-07-09 RX ADMIN — DIAZEPAM 2 MG: 2 TABLET ORAL at 10:07

## 2021-07-09 RX ADMIN — EPHEDRINE SULFATE 5 MG: 50 INJECTION, SOLUTION INTRAMUSCULAR; INTRAVENOUS; SUBCUTANEOUS at 02:07

## 2021-07-09 RX ADMIN — INSULIN ASPART 3 UNITS: 100 INJECTION, SOLUTION INTRAVENOUS; SUBCUTANEOUS at 08:07

## 2021-07-09 RX ADMIN — ACETAMINOPHEN 1000 MG: 10 INJECTION, SOLUTION INTRAVENOUS at 02:07

## 2021-07-09 RX ADMIN — SUCCINYLCHOLINE CHLORIDE 120 MG: 20 INJECTION, SOLUTION INTRAMUSCULAR; INTRAVENOUS at 01:07

## 2021-07-09 RX ADMIN — FENTANYL CITRATE 50 MCG: 50 INJECTION, SOLUTION INTRAMUSCULAR; INTRAVENOUS at 03:07

## 2021-07-09 RX ADMIN — ATORVASTATIN CALCIUM 20 MG: 20 TABLET, FILM COATED ORAL at 09:07

## 2021-07-09 RX ADMIN — DEXTROSE 2 G: 50 INJECTION, SOLUTION INTRAVENOUS at 01:07

## 2021-07-09 RX ADMIN — PROPOFOL 20 MG: 10 INJECTION, EMULSION INTRAVENOUS at 02:07

## 2021-07-09 RX ADMIN — DEXTROSE 2 G: 50 INJECTION, SOLUTION INTRAVENOUS at 09:07

## 2021-07-09 RX ADMIN — INSULIN ASPART 4 UNITS: 100 INJECTION, SOLUTION INTRAVENOUS; SUBCUTANEOUS at 09:07

## 2021-07-09 RX ADMIN — ONDANSETRON 8 MG: 2 INJECTION, SOLUTION INTRAMUSCULAR; INTRAVENOUS at 02:07

## 2021-07-09 RX ADMIN — HYDROCODONE BITARTRATE AND ACETAMINOPHEN 1 TABLET: 5; 325 TABLET ORAL at 03:07

## 2021-07-09 RX ADMIN — LIDOCAINE HYDROCHLORIDE 100 MG: 20 INJECTION, SOLUTION INTRAVENOUS at 01:07

## 2021-07-09 RX ADMIN — INSULIN ASPART 8 UNITS: 100 INJECTION, SOLUTION INTRAVENOUS; SUBCUTANEOUS at 06:07

## 2021-07-09 RX ADMIN — EPHEDRINE SULFATE 5 MG: 50 INJECTION, SOLUTION INTRAMUSCULAR; INTRAVENOUS; SUBCUTANEOUS at 01:07

## 2021-07-09 RX ADMIN — TRANEXAMIC ACID 1000 MG: 100 INJECTION, SOLUTION INTRAVENOUS at 01:07

## 2021-07-09 RX ADMIN — PROPOFOL 80 MG: 10 INJECTION, EMULSION INTRAVENOUS at 01:07

## 2021-07-09 RX ADMIN — DEXAMETHASONE SODIUM PHOSPHATE 8 MG: 4 INJECTION, SOLUTION INTRA-ARTICULAR; INTRALESIONAL; INTRAMUSCULAR; INTRAVENOUS; SOFT TISSUE at 01:07

## 2021-07-09 RX ADMIN — FENTANYL CITRATE 100 MCG: 50 INJECTION, SOLUTION INTRAMUSCULAR; INTRAVENOUS at 01:07

## 2021-07-10 LAB
ALBUMIN SERPL BCP-MCNC: 3.4 G/DL (ref 3.5–5.2)
ALP SERPL-CCNC: 48 U/L (ref 55–135)
ALT SERPL W/O P-5'-P-CCNC: 15 U/L (ref 10–44)
ANION GAP SERPL CALC-SCNC: 13 MMOL/L (ref 8–16)
AST SERPL-CCNC: 22 U/L (ref 10–40)
BASOPHILS # BLD AUTO: 0.01 K/UL (ref 0–0.2)
BASOPHILS NFR BLD: 0.1 % (ref 0–1.9)
BILIRUB SERPL-MCNC: 0.4 MG/DL (ref 0.1–1)
BUN SERPL-MCNC: 21 MG/DL (ref 8–23)
CALCIUM SERPL-MCNC: 8.5 MG/DL (ref 8.7–10.5)
CHLORIDE SERPL-SCNC: 101 MMOL/L (ref 95–110)
CO2 SERPL-SCNC: 22 MMOL/L (ref 23–29)
CREAT SERPL-MCNC: 1.2 MG/DL (ref 0.5–1.4)
DIFFERENTIAL METHOD: ABNORMAL
EOSINOPHIL # BLD AUTO: 0 K/UL (ref 0–0.5)
EOSINOPHIL NFR BLD: 0 % (ref 0–8)
ERYTHROCYTE [DISTWIDTH] IN BLOOD BY AUTOMATED COUNT: 12.4 % (ref 11.5–14.5)
EST. GFR  (AFRICAN AMERICAN): 53 ML/MIN/1.73 M^2
EST. GFR  (NON AFRICAN AMERICAN): 46 ML/MIN/1.73 M^2
GLUCOSE SERPL-MCNC: 309 MG/DL (ref 70–110)
HCT VFR BLD AUTO: 32.6 % (ref 37–48.5)
HGB BLD-MCNC: 11.1 G/DL (ref 12–16)
IMM GRANULOCYTES # BLD AUTO: 0.03 K/UL (ref 0–0.04)
IMM GRANULOCYTES NFR BLD AUTO: 0.3 % (ref 0–0.5)
LYMPHOCYTES # BLD AUTO: 1.3 K/UL (ref 1–4.8)
LYMPHOCYTES NFR BLD: 14.6 % (ref 18–48)
MAGNESIUM SERPL-MCNC: 1.5 MG/DL (ref 1.6–2.6)
MCH RBC QN AUTO: 29.6 PG (ref 27–31)
MCHC RBC AUTO-ENTMCNC: 34 G/DL (ref 32–36)
MCV RBC AUTO: 87 FL (ref 82–98)
MONOCYTES # BLD AUTO: 0.6 K/UL (ref 0.3–1)
MONOCYTES NFR BLD: 6.8 % (ref 4–15)
NEUTROPHILS # BLD AUTO: 6.8 K/UL (ref 1.8–7.7)
NEUTROPHILS NFR BLD: 78.2 % (ref 38–73)
NRBC BLD-RTO: 0 /100 WBC
PHOSPHATE SERPL-MCNC: 4.6 MG/DL (ref 2.7–4.5)
PLATELET # BLD AUTO: 149 K/UL (ref 150–450)
PMV BLD AUTO: 11.5 FL (ref 9.2–12.9)
POCT GLUCOSE: 141 MG/DL (ref 70–110)
POCT GLUCOSE: 291 MG/DL (ref 70–110)
POCT GLUCOSE: 332 MG/DL (ref 70–110)
POCT GLUCOSE: 395 MG/DL (ref 70–110)
POTASSIUM SERPL-SCNC: 3.9 MMOL/L (ref 3.5–5.1)
PROT SERPL-MCNC: 6.5 G/DL (ref 6–8.4)
RBC # BLD AUTO: 3.75 M/UL (ref 4–5.4)
SODIUM SERPL-SCNC: 136 MMOL/L (ref 136–145)
WBC # BLD AUTO: 8.69 K/UL (ref 3.9–12.7)

## 2021-07-10 PROCEDURE — 97162 PT EVAL MOD COMPLEX 30 MIN: CPT

## 2021-07-10 PROCEDURE — 25000003 PHARM REV CODE 250: Performed by: STUDENT IN AN ORGANIZED HEALTH CARE EDUCATION/TRAINING PROGRAM

## 2021-07-10 PROCEDURE — 97166 OT EVAL MOD COMPLEX 45 MIN: CPT

## 2021-07-10 PROCEDURE — 97535 SELF CARE MNGMENT TRAINING: CPT

## 2021-07-10 PROCEDURE — 80053 COMPREHEN METABOLIC PANEL: CPT | Performed by: STUDENT IN AN ORGANIZED HEALTH CARE EDUCATION/TRAINING PROGRAM

## 2021-07-10 PROCEDURE — 94761 N-INVAS EAR/PLS OXIMETRY MLT: CPT

## 2021-07-10 PROCEDURE — 97530 THERAPEUTIC ACTIVITIES: CPT

## 2021-07-10 PROCEDURE — 85025 COMPLETE CBC W/AUTO DIFF WBC: CPT | Performed by: STUDENT IN AN ORGANIZED HEALTH CARE EDUCATION/TRAINING PROGRAM

## 2021-07-10 PROCEDURE — 63600175 PHARM REV CODE 636 W HCPCS: Performed by: STUDENT IN AN ORGANIZED HEALTH CARE EDUCATION/TRAINING PROGRAM

## 2021-07-10 PROCEDURE — 11000001 HC ACUTE MED/SURG PRIVATE ROOM

## 2021-07-10 PROCEDURE — 83735 ASSAY OF MAGNESIUM: CPT | Performed by: STUDENT IN AN ORGANIZED HEALTH CARE EDUCATION/TRAINING PROGRAM

## 2021-07-10 PROCEDURE — 84100 ASSAY OF PHOSPHORUS: CPT | Performed by: STUDENT IN AN ORGANIZED HEALTH CARE EDUCATION/TRAINING PROGRAM

## 2021-07-10 PROCEDURE — 36415 COLL VENOUS BLD VENIPUNCTURE: CPT | Performed by: STUDENT IN AN ORGANIZED HEALTH CARE EDUCATION/TRAINING PROGRAM

## 2021-07-10 RX ORDER — DIAZEPAM 2 MG/1
2 TABLET ORAL ONCE
Status: COMPLETED | OUTPATIENT
Start: 2021-07-10 | End: 2021-07-10

## 2021-07-10 RX ADMIN — HYDROCODONE BITARTRATE AND ACETAMINOPHEN 1 TABLET: 5; 325 TABLET ORAL at 12:07

## 2021-07-10 RX ADMIN — HYDROCODONE BITARTRATE AND ACETAMINOPHEN 1 TABLET: 5; 325 TABLET ORAL at 05:07

## 2021-07-10 RX ADMIN — DEXTROSE 2 G: 50 INJECTION, SOLUTION INTRAVENOUS at 05:07

## 2021-07-10 RX ADMIN — AMLODIPINE BESYLATE 5 MG: 5 TABLET ORAL at 08:07

## 2021-07-10 RX ADMIN — ATORVASTATIN CALCIUM 20 MG: 20 TABLET, FILM COATED ORAL at 08:07

## 2021-07-10 RX ADMIN — HYDROCHLOROTHIAZIDE 25 MG: 25 TABLET ORAL at 08:07

## 2021-07-10 RX ADMIN — INSULIN ASPART 4 UNITS: 100 INJECTION, SOLUTION INTRAVENOUS; SUBCUTANEOUS at 08:07

## 2021-07-10 RX ADMIN — DIAZEPAM 2 MG: 2 TABLET ORAL at 10:07

## 2021-07-10 RX ADMIN — INSULIN ASPART 10 UNITS: 100 INJECTION, SOLUTION INTRAVENOUS; SUBCUTANEOUS at 12:07

## 2021-07-10 RX ADMIN — HYDROCODONE BITARTRATE AND ACETAMINOPHEN 1 TABLET: 5; 325 TABLET ORAL at 08:07

## 2021-07-10 RX ADMIN — INSULIN ASPART 6 UNITS: 100 INJECTION, SOLUTION INTRAVENOUS; SUBCUTANEOUS at 05:07

## 2021-07-11 LAB
ALBUMIN SERPL BCP-MCNC: 3.2 G/DL (ref 3.5–5.2)
ALP SERPL-CCNC: 49 U/L (ref 55–135)
ALT SERPL W/O P-5'-P-CCNC: 10 U/L (ref 10–44)
ANION GAP SERPL CALC-SCNC: 11 MMOL/L (ref 8–16)
AST SERPL-CCNC: 20 U/L (ref 10–40)
BASOPHILS # BLD AUTO: 0.03 K/UL (ref 0–0.2)
BASOPHILS NFR BLD: 0.4 % (ref 0–1.9)
BILIRUB SERPL-MCNC: 0.5 MG/DL (ref 0.1–1)
BUN SERPL-MCNC: 28 MG/DL (ref 8–23)
CALCIUM SERPL-MCNC: 8.5 MG/DL (ref 8.7–10.5)
CHLORIDE SERPL-SCNC: 100 MMOL/L (ref 95–110)
CO2 SERPL-SCNC: 23 MMOL/L (ref 23–29)
CREAT SERPL-MCNC: 1.1 MG/DL (ref 0.5–1.4)
CREAT UR-MCNC: 42.9 MG/DL (ref 15–325)
DIFFERENTIAL METHOD: ABNORMAL
EOSINOPHIL # BLD AUTO: 0.3 K/UL (ref 0–0.5)
EOSINOPHIL NFR BLD: 3.6 % (ref 0–8)
ERYTHROCYTE [DISTWIDTH] IN BLOOD BY AUTOMATED COUNT: 12.5 % (ref 11.5–14.5)
EST. GFR  (AFRICAN AMERICAN): 58 ML/MIN/1.73 M^2
EST. GFR  (NON AFRICAN AMERICAN): 51 ML/MIN/1.73 M^2
GLUCOSE SERPL-MCNC: 185 MG/DL (ref 70–110)
HCT VFR BLD AUTO: 29.3 % (ref 37–48.5)
HGB BLD-MCNC: 10.2 G/DL (ref 12–16)
IMM GRANULOCYTES # BLD AUTO: 0.03 K/UL (ref 0–0.04)
IMM GRANULOCYTES NFR BLD AUTO: 0.4 % (ref 0–0.5)
LYMPHOCYTES # BLD AUTO: 2 K/UL (ref 1–4.8)
LYMPHOCYTES NFR BLD: 26.7 % (ref 18–48)
MAGNESIUM SERPL-MCNC: 1.6 MG/DL (ref 1.6–2.6)
MCH RBC QN AUTO: 30.1 PG (ref 27–31)
MCHC RBC AUTO-ENTMCNC: 34.8 G/DL (ref 32–36)
MCV RBC AUTO: 86 FL (ref 82–98)
MONOCYTES # BLD AUTO: 0.6 K/UL (ref 0.3–1)
MONOCYTES NFR BLD: 8.1 % (ref 4–15)
NEUTROPHILS # BLD AUTO: 4.6 K/UL (ref 1.8–7.7)
NEUTROPHILS NFR BLD: 60.8 % (ref 38–73)
NRBC BLD-RTO: 0 /100 WBC
PHOSPHATE SERPL-MCNC: 3.2 MG/DL (ref 2.7–4.5)
PLATELET # BLD AUTO: 142 K/UL (ref 150–450)
PLATELET BLD QL SMEAR: ABNORMAL
PMV BLD AUTO: 11.5 FL (ref 9.2–12.9)
POCT GLUCOSE: 174 MG/DL (ref 70–110)
POCT GLUCOSE: 200 MG/DL (ref 70–110)
POCT GLUCOSE: 274 MG/DL (ref 70–110)
POTASSIUM SERPL-SCNC: 3.4 MMOL/L (ref 3.5–5.1)
PROT SERPL-MCNC: 6.4 G/DL (ref 6–8.4)
RBC # BLD AUTO: 3.39 M/UL (ref 4–5.4)
SODIUM SERPL-SCNC: 134 MMOL/L (ref 136–145)
SODIUM UR-SCNC: 31 MMOL/L (ref 20–250)
WBC # BLD AUTO: 7.5 K/UL (ref 3.9–12.7)

## 2021-07-11 PROCEDURE — 63600175 PHARM REV CODE 636 W HCPCS: Performed by: STUDENT IN AN ORGANIZED HEALTH CARE EDUCATION/TRAINING PROGRAM

## 2021-07-11 PROCEDURE — 94761 N-INVAS EAR/PLS OXIMETRY MLT: CPT

## 2021-07-11 PROCEDURE — 11000001 HC ACUTE MED/SURG PRIVATE ROOM

## 2021-07-11 PROCEDURE — 83735 ASSAY OF MAGNESIUM: CPT | Performed by: STUDENT IN AN ORGANIZED HEALTH CARE EDUCATION/TRAINING PROGRAM

## 2021-07-11 PROCEDURE — 84100 ASSAY OF PHOSPHORUS: CPT | Performed by: STUDENT IN AN ORGANIZED HEALTH CARE EDUCATION/TRAINING PROGRAM

## 2021-07-11 PROCEDURE — 36415 COLL VENOUS BLD VENIPUNCTURE: CPT | Performed by: STUDENT IN AN ORGANIZED HEALTH CARE EDUCATION/TRAINING PROGRAM

## 2021-07-11 PROCEDURE — 80053 COMPREHEN METABOLIC PANEL: CPT | Performed by: STUDENT IN AN ORGANIZED HEALTH CARE EDUCATION/TRAINING PROGRAM

## 2021-07-11 PROCEDURE — 85025 COMPLETE CBC W/AUTO DIFF WBC: CPT | Performed by: STUDENT IN AN ORGANIZED HEALTH CARE EDUCATION/TRAINING PROGRAM

## 2021-07-11 PROCEDURE — 84300 ASSAY OF URINE SODIUM: CPT | Performed by: STUDENT IN AN ORGANIZED HEALTH CARE EDUCATION/TRAINING PROGRAM

## 2021-07-11 PROCEDURE — 82570 ASSAY OF URINE CREATININE: CPT | Performed by: STUDENT IN AN ORGANIZED HEALTH CARE EDUCATION/TRAINING PROGRAM

## 2021-07-11 PROCEDURE — 25000003 PHARM REV CODE 250: Performed by: STUDENT IN AN ORGANIZED HEALTH CARE EDUCATION/TRAINING PROGRAM

## 2021-07-11 RX ORDER — SODIUM CHLORIDE, SODIUM LACTATE, POTASSIUM CHLORIDE, CALCIUM CHLORIDE 600; 310; 30; 20 MG/100ML; MG/100ML; MG/100ML; MG/100ML
INJECTION, SOLUTION INTRAVENOUS CONTINUOUS
Status: ACTIVE | OUTPATIENT
Start: 2021-07-11 | End: 2021-07-11

## 2021-07-11 RX ORDER — ENOXAPARIN SODIUM 100 MG/ML
40 INJECTION SUBCUTANEOUS EVERY 24 HOURS
Status: DISCONTINUED | OUTPATIENT
Start: 2021-07-11 | End: 2021-07-11

## 2021-07-11 RX ORDER — PANTOPRAZOLE SODIUM 40 MG/1
40 TABLET, DELAYED RELEASE ORAL DAILY
Refills: 3 | Status: DISCONTINUED | OUTPATIENT
Start: 2021-07-11 | End: 2021-07-14 | Stop reason: HOSPADM

## 2021-07-11 RX ORDER — ENOXAPARIN SODIUM 100 MG/ML
40 INJECTION SUBCUTANEOUS EVERY 24 HOURS
Status: DISCONTINUED | OUTPATIENT
Start: 2021-07-11 | End: 2021-07-14 | Stop reason: HOSPADM

## 2021-07-11 RX ADMIN — INSULIN ASPART 1 UNITS: 100 INJECTION, SOLUTION INTRAVENOUS; SUBCUTANEOUS at 06:07

## 2021-07-11 RX ADMIN — INSULIN ASPART 6 UNITS: 100 INJECTION, SOLUTION INTRAVENOUS; SUBCUTANEOUS at 05:07

## 2021-07-11 RX ADMIN — PANTOPRAZOLE SODIUM 40 MG: 40 TABLET, DELAYED RELEASE ORAL at 11:07

## 2021-07-11 RX ADMIN — HYDROCODONE BITARTRATE AND ACETAMINOPHEN 1 TABLET: 5; 325 TABLET ORAL at 06:07

## 2021-07-11 RX ADMIN — ENOXAPARIN SODIUM 40 MG: 40 INJECTION SUBCUTANEOUS at 08:07

## 2021-07-11 RX ADMIN — ATORVASTATIN CALCIUM 20 MG: 20 TABLET, FILM COATED ORAL at 08:07

## 2021-07-11 RX ADMIN — HYDROCHLOROTHIAZIDE 25 MG: 25 TABLET ORAL at 08:07

## 2021-07-11 RX ADMIN — HYDROCODONE BITARTRATE AND ACETAMINOPHEN 1 TABLET: 5; 325 TABLET ORAL at 10:07

## 2021-07-11 RX ADMIN — SODIUM CHLORIDE, SODIUM LACTATE, POTASSIUM CHLORIDE, AND CALCIUM CHLORIDE: .6; .31; .03; .02 INJECTION, SOLUTION INTRAVENOUS at 11:07

## 2021-07-11 RX ADMIN — SODIUM CHLORIDE, SODIUM LACTATE, POTASSIUM CHLORIDE, AND CALCIUM CHLORIDE: .6; .31; .03; .02 INJECTION, SOLUTION INTRAVENOUS at 08:07

## 2021-07-11 RX ADMIN — AMLODIPINE BESYLATE 5 MG: 5 TABLET ORAL at 08:07

## 2021-07-11 RX ADMIN — HYDROCODONE BITARTRATE AND ACETAMINOPHEN 1 TABLET: 5; 325 TABLET ORAL at 05:07

## 2021-07-12 ENCOUNTER — PATIENT MESSAGE (OUTPATIENT)
Dept: FAMILY MEDICINE | Facility: CLINIC | Age: 71
End: 2021-07-12

## 2021-07-12 LAB
ALBUMIN SERPL BCP-MCNC: 2.9 G/DL (ref 3.5–5.2)
ALP SERPL-CCNC: 52 U/L (ref 55–135)
ALT SERPL W/O P-5'-P-CCNC: 13 U/L (ref 10–44)
ANION GAP SERPL CALC-SCNC: 7 MMOL/L (ref 8–16)
AST SERPL-CCNC: 18 U/L (ref 10–40)
BASOPHILS # BLD AUTO: 0.05 K/UL (ref 0–0.2)
BASOPHILS NFR BLD: 0.7 % (ref 0–1.9)
BILIRUB SERPL-MCNC: 0.4 MG/DL (ref 0.1–1)
BUN SERPL-MCNC: 18 MG/DL (ref 8–23)
CALCIUM SERPL-MCNC: 8.6 MG/DL (ref 8.7–10.5)
CHLORIDE SERPL-SCNC: 103 MMOL/L (ref 95–110)
CO2 SERPL-SCNC: 26 MMOL/L (ref 23–29)
CREAT SERPL-MCNC: 0.9 MG/DL (ref 0.5–1.4)
DIFFERENTIAL METHOD: ABNORMAL
EOSINOPHIL # BLD AUTO: 0.4 K/UL (ref 0–0.5)
EOSINOPHIL NFR BLD: 5.6 % (ref 0–8)
ERYTHROCYTE [DISTWIDTH] IN BLOOD BY AUTOMATED COUNT: 12.3 % (ref 11.5–14.5)
EST. GFR  (AFRICAN AMERICAN): >60 ML/MIN/1.73 M^2
EST. GFR  (NON AFRICAN AMERICAN): >60 ML/MIN/1.73 M^2
GLUCOSE SERPL-MCNC: 137 MG/DL (ref 70–110)
HCT VFR BLD AUTO: 29.9 % (ref 37–48.5)
HGB BLD-MCNC: 10 G/DL (ref 12–16)
IMM GRANULOCYTES # BLD AUTO: 0.02 K/UL (ref 0–0.04)
IMM GRANULOCYTES NFR BLD AUTO: 0.3 % (ref 0–0.5)
LYMPHOCYTES # BLD AUTO: 3.1 K/UL (ref 1–4.8)
LYMPHOCYTES NFR BLD: 41.2 % (ref 18–48)
MAGNESIUM SERPL-MCNC: 1.8 MG/DL (ref 1.6–2.6)
MCH RBC QN AUTO: 29.4 PG (ref 27–31)
MCHC RBC AUTO-ENTMCNC: 33.4 G/DL (ref 32–36)
MCV RBC AUTO: 88 FL (ref 82–98)
MONOCYTES # BLD AUTO: 0.7 K/UL (ref 0.3–1)
MONOCYTES NFR BLD: 9 % (ref 4–15)
NEUTROPHILS # BLD AUTO: 3.2 K/UL (ref 1.8–7.7)
NEUTROPHILS NFR BLD: 43.2 % (ref 38–73)
NRBC BLD-RTO: 0 /100 WBC
PHOSPHATE SERPL-MCNC: 2.6 MG/DL (ref 2.7–4.5)
PLATELET # BLD AUTO: 164 K/UL (ref 150–450)
PMV BLD AUTO: 11.5 FL (ref 9.2–12.9)
POCT GLUCOSE: 157 MG/DL (ref 70–110)
POCT GLUCOSE: 190 MG/DL (ref 70–110)
POCT GLUCOSE: 343 MG/DL (ref 70–110)
POTASSIUM SERPL-SCNC: 3.5 MMOL/L (ref 3.5–5.1)
PROT SERPL-MCNC: 6.3 G/DL (ref 6–8.4)
RBC # BLD AUTO: 3.4 M/UL (ref 4–5.4)
SODIUM SERPL-SCNC: 136 MMOL/L (ref 136–145)
WBC # BLD AUTO: 7.45 K/UL (ref 3.9–12.7)

## 2021-07-12 PROCEDURE — 36415 COLL VENOUS BLD VENIPUNCTURE: CPT | Performed by: STUDENT IN AN ORGANIZED HEALTH CARE EDUCATION/TRAINING PROGRAM

## 2021-07-12 PROCEDURE — 25000003 PHARM REV CODE 250: Performed by: STUDENT IN AN ORGANIZED HEALTH CARE EDUCATION/TRAINING PROGRAM

## 2021-07-12 PROCEDURE — 63600175 PHARM REV CODE 636 W HCPCS: Performed by: STUDENT IN AN ORGANIZED HEALTH CARE EDUCATION/TRAINING PROGRAM

## 2021-07-12 PROCEDURE — 86580 TB INTRADERMAL TEST: CPT | Performed by: STUDENT IN AN ORGANIZED HEALTH CARE EDUCATION/TRAINING PROGRAM

## 2021-07-12 PROCEDURE — 30200315 PPD INTRADERMAL TEST REV CODE 302: Performed by: STUDENT IN AN ORGANIZED HEALTH CARE EDUCATION/TRAINING PROGRAM

## 2021-07-12 PROCEDURE — 97535 SELF CARE MNGMENT TRAINING: CPT | Mod: CO

## 2021-07-12 PROCEDURE — 11000001 HC ACUTE MED/SURG PRIVATE ROOM

## 2021-07-12 PROCEDURE — 84100 ASSAY OF PHOSPHORUS: CPT | Performed by: STUDENT IN AN ORGANIZED HEALTH CARE EDUCATION/TRAINING PROGRAM

## 2021-07-12 PROCEDURE — 97116 GAIT TRAINING THERAPY: CPT

## 2021-07-12 PROCEDURE — 94761 N-INVAS EAR/PLS OXIMETRY MLT: CPT

## 2021-07-12 PROCEDURE — 83735 ASSAY OF MAGNESIUM: CPT | Performed by: STUDENT IN AN ORGANIZED HEALTH CARE EDUCATION/TRAINING PROGRAM

## 2021-07-12 PROCEDURE — 80053 COMPREHEN METABOLIC PANEL: CPT | Performed by: STUDENT IN AN ORGANIZED HEALTH CARE EDUCATION/TRAINING PROGRAM

## 2021-07-12 PROCEDURE — 85025 COMPLETE CBC W/AUTO DIFF WBC: CPT | Performed by: STUDENT IN AN ORGANIZED HEALTH CARE EDUCATION/TRAINING PROGRAM

## 2021-07-12 RX ORDER — AMOXICILLIN 250 MG
1 CAPSULE ORAL DAILY
Status: DISCONTINUED | OUTPATIENT
Start: 2021-07-12 | End: 2021-07-14 | Stop reason: HOSPADM

## 2021-07-12 RX ORDER — POLYETHYLENE GLYCOL 3350 17 G/17G
17 POWDER, FOR SOLUTION ORAL DAILY
Status: DISCONTINUED | OUTPATIENT
Start: 2021-07-13 | End: 2021-07-14 | Stop reason: HOSPADM

## 2021-07-12 RX ADMIN — HYDROCHLOROTHIAZIDE 25 MG: 25 TABLET ORAL at 08:07

## 2021-07-12 RX ADMIN — INSULIN ASPART 8 UNITS: 100 INJECTION, SOLUTION INTRAVENOUS; SUBCUTANEOUS at 04:07

## 2021-07-12 RX ADMIN — ENOXAPARIN SODIUM 40 MG: 40 INJECTION SUBCUTANEOUS at 04:07

## 2021-07-12 RX ADMIN — TUBERCULIN PURIFIED PROTEIN DERIVATIVE 5 UNITS: 5 INJECTION, SOLUTION INTRADERMAL at 01:07

## 2021-07-12 RX ADMIN — AMLODIPINE BESYLATE 5 MG: 5 TABLET ORAL at 08:07

## 2021-07-12 RX ADMIN — PANTOPRAZOLE SODIUM 40 MG: 40 TABLET, DELAYED RELEASE ORAL at 08:07

## 2021-07-12 RX ADMIN — MELATONIN 9 MG: 3 TAB ORAL at 08:07

## 2021-07-12 RX ADMIN — ATORVASTATIN CALCIUM 20 MG: 20 TABLET, FILM COATED ORAL at 08:07

## 2021-07-12 RX ADMIN — HYDROCODONE BITARTRATE AND ACETAMINOPHEN 1 TABLET: 5; 325 TABLET ORAL at 11:07

## 2021-07-12 RX ADMIN — DOCUSATE SODIUM 50 MG AND SENNOSIDES 8.6 MG 1 TABLET: 8.6; 5 TABLET, FILM COATED ORAL at 08:07

## 2021-07-13 LAB
ALBUMIN SERPL BCP-MCNC: 2.8 G/DL (ref 3.5–5.2)
ALP SERPL-CCNC: 57 U/L (ref 55–135)
ALT SERPL W/O P-5'-P-CCNC: 10 U/L (ref 10–44)
ANION GAP SERPL CALC-SCNC: 11 MMOL/L (ref 8–16)
AST SERPL-CCNC: 16 U/L (ref 10–40)
BASOPHILS # BLD AUTO: 0.05 K/UL (ref 0–0.2)
BASOPHILS NFR BLD: 0.8 % (ref 0–1.9)
BILIRUB SERPL-MCNC: 0.5 MG/DL (ref 0.1–1)
BUN SERPL-MCNC: 18 MG/DL (ref 8–23)
CALCIUM SERPL-MCNC: 8.7 MG/DL (ref 8.7–10.5)
CHLORIDE SERPL-SCNC: 100 MMOL/L (ref 95–110)
CO2 SERPL-SCNC: 26 MMOL/L (ref 23–29)
CREAT SERPL-MCNC: 0.8 MG/DL (ref 0.5–1.4)
DIFFERENTIAL METHOD: ABNORMAL
EOSINOPHIL # BLD AUTO: 0.4 K/UL (ref 0–0.5)
EOSINOPHIL NFR BLD: 6.1 % (ref 0–8)
ERYTHROCYTE [DISTWIDTH] IN BLOOD BY AUTOMATED COUNT: 12.4 % (ref 11.5–14.5)
EST. GFR  (AFRICAN AMERICAN): >60 ML/MIN/1.73 M^2
EST. GFR  (NON AFRICAN AMERICAN): >60 ML/MIN/1.73 M^2
GLUCOSE SERPL-MCNC: 202 MG/DL (ref 70–110)
HCT VFR BLD AUTO: 29.9 % (ref 37–48.5)
HGB BLD-MCNC: 10.2 G/DL (ref 12–16)
IMM GRANULOCYTES # BLD AUTO: 0.03 K/UL (ref 0–0.04)
IMM GRANULOCYTES NFR BLD AUTO: 0.5 % (ref 0–0.5)
LYMPHOCYTES # BLD AUTO: 2.5 K/UL (ref 1–4.8)
LYMPHOCYTES NFR BLD: 39.7 % (ref 18–48)
MAGNESIUM SERPL-MCNC: 1.7 MG/DL (ref 1.6–2.6)
MCH RBC QN AUTO: 29.3 PG (ref 27–31)
MCHC RBC AUTO-ENTMCNC: 34.1 G/DL (ref 32–36)
MCV RBC AUTO: 86 FL (ref 82–98)
MONOCYTES # BLD AUTO: 0.6 K/UL (ref 0.3–1)
MONOCYTES NFR BLD: 8.8 % (ref 4–15)
NEUTROPHILS # BLD AUTO: 2.8 K/UL (ref 1.8–7.7)
NEUTROPHILS NFR BLD: 44.1 % (ref 38–73)
NRBC BLD-RTO: 0 /100 WBC
PHOSPHATE SERPL-MCNC: 2.8 MG/DL (ref 2.7–4.5)
PLATELET # BLD AUTO: 178 K/UL (ref 150–450)
PMV BLD AUTO: 10.6 FL (ref 9.2–12.9)
POCT GLUCOSE: 201 MG/DL (ref 70–110)
POCT GLUCOSE: 224 MG/DL (ref 70–110)
POCT GLUCOSE: 230 MG/DL (ref 70–110)
POCT GLUCOSE: 303 MG/DL (ref 70–110)
POCT GLUCOSE: 366 MG/DL (ref 70–110)
POTASSIUM SERPL-SCNC: 3.5 MMOL/L (ref 3.5–5.1)
PROT SERPL-MCNC: 6.4 G/DL (ref 6–8.4)
RBC # BLD AUTO: 3.48 M/UL (ref 4–5.4)
SODIUM SERPL-SCNC: 137 MMOL/L (ref 136–145)
WBC # BLD AUTO: 6.27 K/UL (ref 3.9–12.7)

## 2021-07-13 PROCEDURE — 94761 N-INVAS EAR/PLS OXIMETRY MLT: CPT

## 2021-07-13 PROCEDURE — 80053 COMPREHEN METABOLIC PANEL: CPT | Performed by: STUDENT IN AN ORGANIZED HEALTH CARE EDUCATION/TRAINING PROGRAM

## 2021-07-13 PROCEDURE — 97530 THERAPEUTIC ACTIVITIES: CPT | Mod: CO

## 2021-07-13 PROCEDURE — 84100 ASSAY OF PHOSPHORUS: CPT | Performed by: STUDENT IN AN ORGANIZED HEALTH CARE EDUCATION/TRAINING PROGRAM

## 2021-07-13 PROCEDURE — 97116 GAIT TRAINING THERAPY: CPT

## 2021-07-13 PROCEDURE — 85025 COMPLETE CBC W/AUTO DIFF WBC: CPT | Performed by: STUDENT IN AN ORGANIZED HEALTH CARE EDUCATION/TRAINING PROGRAM

## 2021-07-13 PROCEDURE — 25000003 PHARM REV CODE 250: Performed by: STUDENT IN AN ORGANIZED HEALTH CARE EDUCATION/TRAINING PROGRAM

## 2021-07-13 PROCEDURE — 97110 THERAPEUTIC EXERCISES: CPT

## 2021-07-13 PROCEDURE — 36415 COLL VENOUS BLD VENIPUNCTURE: CPT | Performed by: STUDENT IN AN ORGANIZED HEALTH CARE EDUCATION/TRAINING PROGRAM

## 2021-07-13 PROCEDURE — C9399 UNCLASSIFIED DRUGS OR BIOLOG: HCPCS | Performed by: STUDENT IN AN ORGANIZED HEALTH CARE EDUCATION/TRAINING PROGRAM

## 2021-07-13 PROCEDURE — 11000001 HC ACUTE MED/SURG PRIVATE ROOM

## 2021-07-13 PROCEDURE — 63600175 PHARM REV CODE 636 W HCPCS: Performed by: STUDENT IN AN ORGANIZED HEALTH CARE EDUCATION/TRAINING PROGRAM

## 2021-07-13 PROCEDURE — 83735 ASSAY OF MAGNESIUM: CPT | Performed by: STUDENT IN AN ORGANIZED HEALTH CARE EDUCATION/TRAINING PROGRAM

## 2021-07-13 PROCEDURE — 97535 SELF CARE MNGMENT TRAINING: CPT | Mod: CO

## 2021-07-13 RX ADMIN — INSULIN DETEMIR 9 UNITS: 100 INJECTION, SOLUTION SUBCUTANEOUS at 08:07

## 2021-07-13 RX ADMIN — AMLODIPINE BESYLATE 5 MG: 5 TABLET ORAL at 08:07

## 2021-07-13 RX ADMIN — ENOXAPARIN SODIUM 40 MG: 40 INJECTION SUBCUTANEOUS at 05:07

## 2021-07-13 RX ADMIN — HYDROCODONE BITARTRATE AND ACETAMINOPHEN 1 TABLET: 5; 325 TABLET ORAL at 09:07

## 2021-07-13 RX ADMIN — HYDROCODONE BITARTRATE AND ACETAMINOPHEN 1 TABLET: 5; 325 TABLET ORAL at 05:07

## 2021-07-13 RX ADMIN — INSULIN ASPART 2 UNITS: 100 INJECTION, SOLUTION INTRAVENOUS; SUBCUTANEOUS at 08:07

## 2021-07-13 RX ADMIN — HYDROCHLOROTHIAZIDE 25 MG: 25 TABLET ORAL at 08:07

## 2021-07-13 RX ADMIN — INSULIN ASPART 10 UNITS: 100 INJECTION, SOLUTION INTRAVENOUS; SUBCUTANEOUS at 05:07

## 2021-07-13 RX ADMIN — INSULIN ASPART 4 UNITS: 100 INJECTION, SOLUTION INTRAVENOUS; SUBCUTANEOUS at 05:07

## 2021-07-13 RX ADMIN — POLYETHYLENE GLYCOL 3350 17 G: 17 POWDER, FOR SOLUTION ORAL at 08:07

## 2021-07-13 RX ADMIN — MELATONIN 9 MG: 3 TAB ORAL at 09:07

## 2021-07-13 RX ADMIN — ATORVASTATIN CALCIUM 20 MG: 20 TABLET, FILM COATED ORAL at 08:07

## 2021-07-13 RX ADMIN — PANTOPRAZOLE SODIUM 40 MG: 40 TABLET, DELAYED RELEASE ORAL at 08:07

## 2021-07-13 RX ADMIN — DOCUSATE SODIUM 50 MG AND SENNOSIDES 8.6 MG 1 TABLET: 8.6; 5 TABLET, FILM COATED ORAL at 08:07

## 2021-07-14 ENCOUNTER — HOSPITAL ENCOUNTER (INPATIENT)
Facility: HOSPITAL | Age: 71
LOS: 15 days | Discharge: HOME HEALTH | DRG: 560 | End: 2021-07-29
Attending: HOSPITALIST | Admitting: HOSPITALIST
Payer: MEDICARE

## 2021-07-14 VITALS
OXYGEN SATURATION: 95 % | HEIGHT: 66 IN | TEMPERATURE: 97 F | RESPIRATION RATE: 20 BRPM | SYSTOLIC BLOOD PRESSURE: 140 MMHG | DIASTOLIC BLOOD PRESSURE: 64 MMHG | HEART RATE: 75 BPM | WEIGHT: 181.88 LBS | BODY MASS INDEX: 29.23 KG/M2

## 2021-07-14 DIAGNOSIS — I10 ESSENTIAL HYPERTENSION: ICD-10-CM

## 2021-07-14 DIAGNOSIS — Z96.641 STATUS POST TOTAL REPLACEMENT OF RIGHT HIP: Primary | ICD-10-CM

## 2021-07-14 DIAGNOSIS — S72.001A CLOSED DISPLACED FRACTURE OF RIGHT FEMORAL NECK: ICD-10-CM

## 2021-07-14 LAB
ALBUMIN SERPL BCP-MCNC: 2.7 G/DL (ref 3.5–5.2)
ALP SERPL-CCNC: 55 U/L (ref 55–135)
ALT SERPL W/O P-5'-P-CCNC: 12 U/L (ref 10–44)
ANION GAP SERPL CALC-SCNC: 12 MMOL/L (ref 8–16)
AST SERPL-CCNC: 15 U/L (ref 10–40)
BASOPHILS # BLD AUTO: 0.03 K/UL (ref 0–0.2)
BASOPHILS NFR BLD: 0.5 % (ref 0–1.9)
BILIRUB SERPL-MCNC: 0.6 MG/DL (ref 0.1–1)
BUN SERPL-MCNC: 20 MG/DL (ref 8–23)
CALCIUM SERPL-MCNC: 8.8 MG/DL (ref 8.7–10.5)
CHLORIDE SERPL-SCNC: 100 MMOL/L (ref 95–110)
CO2 SERPL-SCNC: 26 MMOL/L (ref 23–29)
CREAT SERPL-MCNC: 0.8 MG/DL (ref 0.5–1.4)
DIFFERENTIAL METHOD: ABNORMAL
EOSINOPHIL # BLD AUTO: 0.4 K/UL (ref 0–0.5)
EOSINOPHIL NFR BLD: 7.6 % (ref 0–8)
ERYTHROCYTE [DISTWIDTH] IN BLOOD BY AUTOMATED COUNT: 12.4 % (ref 11.5–14.5)
EST. GFR  (AFRICAN AMERICAN): >60 ML/MIN/1.73 M^2
EST. GFR  (NON AFRICAN AMERICAN): >60 ML/MIN/1.73 M^2
GLUCOSE SERPL-MCNC: 168 MG/DL (ref 70–110)
HCT VFR BLD AUTO: 29.4 % (ref 37–48.5)
HGB BLD-MCNC: 9.9 G/DL (ref 12–16)
IMM GRANULOCYTES # BLD AUTO: 0.03 K/UL (ref 0–0.04)
IMM GRANULOCYTES NFR BLD AUTO: 0.5 % (ref 0–0.5)
LYMPHOCYTES # BLD AUTO: 2.5 K/UL (ref 1–4.8)
LYMPHOCYTES NFR BLD: 45.3 % (ref 18–48)
MAGNESIUM SERPL-MCNC: 1.7 MG/DL (ref 1.6–2.6)
MCH RBC QN AUTO: 29.4 PG (ref 27–31)
MCHC RBC AUTO-ENTMCNC: 33.7 G/DL (ref 32–36)
MCV RBC AUTO: 87 FL (ref 82–98)
MONOCYTES # BLD AUTO: 0.6 K/UL (ref 0.3–1)
MONOCYTES NFR BLD: 10.4 % (ref 4–15)
NEUTROPHILS # BLD AUTO: 2 K/UL (ref 1.8–7.7)
NEUTROPHILS NFR BLD: 35.7 % (ref 38–73)
NRBC BLD-RTO: 0 /100 WBC
PHOSPHATE SERPL-MCNC: 3.3 MG/DL (ref 2.7–4.5)
PLATELET # BLD AUTO: 199 K/UL (ref 150–450)
PMV BLD AUTO: 10.7 FL (ref 9.2–12.9)
POCT GLUCOSE: 162 MG/DL (ref 70–110)
POCT GLUCOSE: 233 MG/DL (ref 70–110)
POCT GLUCOSE: 289 MG/DL (ref 70–110)
POTASSIUM SERPL-SCNC: 3.4 MMOL/L (ref 3.5–5.1)
PROT SERPL-MCNC: 6.3 G/DL (ref 6–8.4)
RBC # BLD AUTO: 3.37 M/UL (ref 4–5.4)
SODIUM SERPL-SCNC: 138 MMOL/L (ref 136–145)
TB INDURATION 48 - 72 HR READ: 0 MM
WBC # BLD AUTO: 5.5 K/UL (ref 3.9–12.7)

## 2021-07-14 PROCEDURE — 84100 ASSAY OF PHOSPHORUS: CPT | Performed by: STUDENT IN AN ORGANIZED HEALTH CARE EDUCATION/TRAINING PROGRAM

## 2021-07-14 PROCEDURE — 94761 N-INVAS EAR/PLS OXIMETRY MLT: CPT

## 2021-07-14 PROCEDURE — 11000004 HC SNF PRIVATE

## 2021-07-14 PROCEDURE — 63600175 PHARM REV CODE 636 W HCPCS: Performed by: STUDENT IN AN ORGANIZED HEALTH CARE EDUCATION/TRAINING PROGRAM

## 2021-07-14 PROCEDURE — 97535 SELF CARE MNGMENT TRAINING: CPT | Mod: CO

## 2021-07-14 PROCEDURE — 25000003 PHARM REV CODE 250: Performed by: STUDENT IN AN ORGANIZED HEALTH CARE EDUCATION/TRAINING PROGRAM

## 2021-07-14 PROCEDURE — 83735 ASSAY OF MAGNESIUM: CPT | Performed by: STUDENT IN AN ORGANIZED HEALTH CARE EDUCATION/TRAINING PROGRAM

## 2021-07-14 PROCEDURE — 80053 COMPREHEN METABOLIC PANEL: CPT | Performed by: STUDENT IN AN ORGANIZED HEALTH CARE EDUCATION/TRAINING PROGRAM

## 2021-07-14 PROCEDURE — 97530 THERAPEUTIC ACTIVITIES: CPT | Mod: CQ

## 2021-07-14 PROCEDURE — 85025 COMPLETE CBC W/AUTO DIFF WBC: CPT | Performed by: STUDENT IN AN ORGANIZED HEALTH CARE EDUCATION/TRAINING PROGRAM

## 2021-07-14 PROCEDURE — 97116 GAIT TRAINING THERAPY: CPT | Mod: CQ

## 2021-07-14 PROCEDURE — C9399 UNCLASSIFIED DRUGS OR BIOLOG: HCPCS | Performed by: STUDENT IN AN ORGANIZED HEALTH CARE EDUCATION/TRAINING PROGRAM

## 2021-07-14 PROCEDURE — 36415 COLL VENOUS BLD VENIPUNCTURE: CPT | Performed by: STUDENT IN AN ORGANIZED HEALTH CARE EDUCATION/TRAINING PROGRAM

## 2021-07-14 RX ORDER — GLUCAGON 1 MG
1 KIT INJECTION
Status: CANCELLED | OUTPATIENT
Start: 2021-07-14

## 2021-07-14 RX ORDER — ONDANSETRON 2 MG/ML
4 INJECTION INTRAMUSCULAR; INTRAVENOUS ONCE AS NEEDED
Status: CANCELLED | OUTPATIENT
Start: 2021-07-14 | End: 2032-12-09

## 2021-07-14 RX ORDER — PANTOPRAZOLE SODIUM 40 MG/1
40 TABLET, DELAYED RELEASE ORAL DAILY
Status: CANCELLED | OUTPATIENT
Start: 2021-07-14

## 2021-07-14 RX ORDER — LABETALOL HYDROCHLORIDE 5 MG/ML
10 INJECTION, SOLUTION INTRAVENOUS EVERY 6 HOURS PRN
Status: CANCELLED | OUTPATIENT
Start: 2021-07-14

## 2021-07-14 RX ORDER — INSULIN ASPART 100 [IU]/ML
1-10 INJECTION, SOLUTION INTRAVENOUS; SUBCUTANEOUS EVERY 6 HOURS PRN
Status: DISCONTINUED | OUTPATIENT
Start: 2021-07-14 | End: 2021-07-29 | Stop reason: HOSPADM

## 2021-07-14 RX ORDER — AMLODIPINE BESYLATE 5 MG/1
5 TABLET ORAL NIGHTLY
Status: DISCONTINUED | OUTPATIENT
Start: 2021-07-14 | End: 2021-07-23

## 2021-07-14 RX ORDER — ACETAMINOPHEN 325 MG/1
650 TABLET ORAL EVERY 8 HOURS PRN
Status: DISCONTINUED | OUTPATIENT
Start: 2021-07-14 | End: 2021-07-19

## 2021-07-14 RX ORDER — AMLODIPINE BESYLATE 5 MG/1
5 TABLET ORAL NIGHTLY
Status: CANCELLED | OUTPATIENT
Start: 2021-07-14

## 2021-07-14 RX ORDER — ATORVASTATIN CALCIUM 20 MG/1
20 TABLET, FILM COATED ORAL NIGHTLY
Status: DISCONTINUED | OUTPATIENT
Start: 2021-07-14 | End: 2021-07-29 | Stop reason: HOSPADM

## 2021-07-14 RX ORDER — AMOXICILLIN 250 MG
1 CAPSULE ORAL DAILY
Status: CANCELLED | OUTPATIENT
Start: 2021-07-14

## 2021-07-14 RX ORDER — AMOXICILLIN 250 MG
1 CAPSULE ORAL 2 TIMES DAILY
Status: DISCONTINUED | OUTPATIENT
Start: 2021-07-14 | End: 2021-07-21

## 2021-07-14 RX ORDER — ENOXAPARIN SODIUM 100 MG/ML
40 INJECTION SUBCUTANEOUS EVERY 24 HOURS
Status: DISCONTINUED | OUTPATIENT
Start: 2021-07-14 | End: 2021-07-29 | Stop reason: HOSPADM

## 2021-07-14 RX ORDER — HYDROCHLOROTHIAZIDE 25 MG/1
25 TABLET ORAL DAILY
Status: CANCELLED | OUTPATIENT
Start: 2021-07-14

## 2021-07-14 RX ORDER — ONDANSETRON 2 MG/ML
4 INJECTION INTRAMUSCULAR; INTRAVENOUS EVERY 8 HOURS PRN
Status: DISCONTINUED | OUTPATIENT
Start: 2021-07-14 | End: 2021-07-15

## 2021-07-14 RX ORDER — SODIUM CHLORIDE 0.9 % (FLUSH) 0.9 %
10 SYRINGE (ML) INJECTION
Status: CANCELLED | OUTPATIENT
Start: 2021-07-14

## 2021-07-14 RX ORDER — SODIUM CHLORIDE 0.9 % (FLUSH) 0.9 %
10 SYRINGE (ML) INJECTION
Status: DISCONTINUED | OUTPATIENT
Start: 2021-07-14 | End: 2021-07-29 | Stop reason: HOSPADM

## 2021-07-14 RX ORDER — INSULIN ASPART 100 [IU]/ML
1-10 INJECTION, SOLUTION INTRAVENOUS; SUBCUTANEOUS EVERY 6 HOURS PRN
Status: CANCELLED | OUTPATIENT
Start: 2021-07-14

## 2021-07-14 RX ORDER — ONDANSETRON 2 MG/ML
4 INJECTION INTRAMUSCULAR; INTRAVENOUS EVERY 8 HOURS PRN
Status: CANCELLED | OUTPATIENT
Start: 2021-07-14

## 2021-07-14 RX ORDER — LABETALOL HCL 20 MG/4 ML
10 SYRINGE (ML) INTRAVENOUS EVERY 6 HOURS PRN
Status: DISCONTINUED | OUTPATIENT
Start: 2021-07-14 | End: 2021-07-15

## 2021-07-14 RX ORDER — TALC
9 POWDER (GRAM) TOPICAL NIGHTLY PRN
Status: DISCONTINUED | OUTPATIENT
Start: 2021-07-14 | End: 2021-07-17

## 2021-07-14 RX ORDER — ONDANSETRON 2 MG/ML
4 INJECTION INTRAMUSCULAR; INTRAVENOUS ONCE AS NEEDED
Status: DISCONTINUED | OUTPATIENT
Start: 2021-07-14 | End: 2021-07-15

## 2021-07-14 RX ORDER — AMOXICILLIN 250 MG
1 CAPSULE ORAL 2 TIMES DAILY
Status: CANCELLED | OUTPATIENT
Start: 2021-07-14

## 2021-07-14 RX ORDER — GLUCAGON 1 MG
1 KIT INJECTION
Status: DISCONTINUED | OUTPATIENT
Start: 2021-07-14 | End: 2021-07-29 | Stop reason: HOSPADM

## 2021-07-14 RX ORDER — PANTOPRAZOLE SODIUM 40 MG/1
40 TABLET, DELAYED RELEASE ORAL DAILY
Status: DISCONTINUED | OUTPATIENT
Start: 2021-07-15 | End: 2021-07-29 | Stop reason: HOSPADM

## 2021-07-14 RX ORDER — ACETAMINOPHEN 325 MG/1
650 TABLET ORAL EVERY 8 HOURS PRN
Status: CANCELLED | OUTPATIENT
Start: 2021-07-14

## 2021-07-14 RX ORDER — CALCIUM CARBONATE 200(500)MG
500 TABLET,CHEWABLE ORAL 2 TIMES DAILY PRN
Status: CANCELLED | OUTPATIENT
Start: 2021-07-14

## 2021-07-14 RX ORDER — AMOXICILLIN 250 MG
1 CAPSULE ORAL DAILY
Status: DISCONTINUED | OUTPATIENT
Start: 2021-07-15 | End: 2021-07-14 | Stop reason: SDUPTHER

## 2021-07-14 RX ORDER — POLYETHYLENE GLYCOL 3350 17 G/17G
17 POWDER, FOR SOLUTION ORAL DAILY
Status: DISCONTINUED | OUTPATIENT
Start: 2021-07-15 | End: 2021-07-26

## 2021-07-14 RX ORDER — TALC
9 POWDER (GRAM) TOPICAL NIGHTLY PRN
Status: CANCELLED | OUTPATIENT
Start: 2021-07-14

## 2021-07-14 RX ORDER — CALCIUM CARBONATE 200(500)MG
500 TABLET,CHEWABLE ORAL 2 TIMES DAILY PRN
Status: DISCONTINUED | OUTPATIENT
Start: 2021-07-14 | End: 2021-07-29 | Stop reason: HOSPADM

## 2021-07-14 RX ORDER — ACETAMINOPHEN 325 MG/1
650 TABLET ORAL EVERY 8 HOURS PRN
Status: DISCONTINUED | OUTPATIENT
Start: 2021-07-14 | End: 2021-07-29 | Stop reason: HOSPADM

## 2021-07-14 RX ORDER — HYDROCHLOROTHIAZIDE 12.5 MG/1
25 TABLET ORAL DAILY
Status: DISCONTINUED | OUTPATIENT
Start: 2021-07-15 | End: 2021-07-29 | Stop reason: HOSPADM

## 2021-07-14 RX ORDER — ATORVASTATIN CALCIUM 20 MG/1
20 TABLET, FILM COATED ORAL NIGHTLY
Status: CANCELLED | OUTPATIENT
Start: 2021-07-14

## 2021-07-14 RX ORDER — POLYETHYLENE GLYCOL 3350 17 G/17G
17 POWDER, FOR SOLUTION ORAL DAILY
Status: CANCELLED | OUTPATIENT
Start: 2021-07-14

## 2021-07-14 RX ORDER — ENOXAPARIN SODIUM 100 MG/ML
40 INJECTION SUBCUTANEOUS EVERY 24 HOURS
Status: CANCELLED | OUTPATIENT
Start: 2021-07-14

## 2021-07-14 RX ADMIN — INSULIN ASPART 2 UNITS: 100 INJECTION, SOLUTION INTRAVENOUS; SUBCUTANEOUS at 07:07

## 2021-07-14 RX ADMIN — ATORVASTATIN CALCIUM 20 MG: 20 TABLET, FILM COATED ORAL at 08:07

## 2021-07-14 RX ADMIN — MELATONIN TAB 3 MG 9 MG: 3 TAB at 08:07

## 2021-07-14 RX ADMIN — ENOXAPARIN SODIUM 40 MG: 40 INJECTION SUBCUTANEOUS at 05:07

## 2021-07-14 RX ADMIN — AMLODIPINE BESYLATE 5 MG: 5 TABLET ORAL at 08:07

## 2021-07-14 RX ADMIN — INSULIN DETEMIR 14 UNITS: 100 INJECTION, SOLUTION SUBCUTANEOUS at 08:07

## 2021-07-14 RX ADMIN — HYDROCHLOROTHIAZIDE 25 MG: 25 TABLET ORAL at 09:07

## 2021-07-14 RX ADMIN — PANTOPRAZOLE SODIUM 40 MG: 40 TABLET, DELAYED RELEASE ORAL at 09:07

## 2021-07-14 RX ADMIN — INSULIN ASPART 4 UNITS: 100 INJECTION, SOLUTION INTRAVENOUS; SUBCUTANEOUS at 12:07

## 2021-07-14 RX ADMIN — INSULIN ASPART 3 UNITS: 100 INJECTION, SOLUTION INTRAVENOUS; SUBCUTANEOUS at 09:07

## 2021-07-14 RX ADMIN — INSULIN ASPART 8 UNITS: 100 INJECTION, SOLUTION INTRAVENOUS; SUBCUTANEOUS at 05:07

## 2021-07-14 RX ADMIN — ACETAMINOPHEN 650 MG: 325 TABLET ORAL at 06:07

## 2021-07-15 LAB
ALBUMIN SERPL BCP-MCNC: 2.9 G/DL (ref 3.5–5.2)
ALP SERPL-CCNC: 58 U/L (ref 55–135)
ALT SERPL W/O P-5'-P-CCNC: 10 U/L (ref 10–44)
ANION GAP SERPL CALC-SCNC: 12 MMOL/L (ref 8–16)
AST SERPL-CCNC: 17 U/L (ref 10–40)
BASOPHILS # BLD AUTO: 0.04 K/UL (ref 0–0.2)
BASOPHILS NFR BLD: 0.6 % (ref 0–1.9)
BILIRUB SERPL-MCNC: 0.6 MG/DL (ref 0.1–1)
BUN SERPL-MCNC: 18 MG/DL (ref 8–23)
CALCIUM SERPL-MCNC: 9.4 MG/DL (ref 8.7–10.5)
CHLORIDE SERPL-SCNC: 98 MMOL/L (ref 95–110)
CO2 SERPL-SCNC: 28 MMOL/L (ref 23–29)
CREAT SERPL-MCNC: 0.9 MG/DL (ref 0.5–1.4)
DIFFERENTIAL METHOD: ABNORMAL
EOSINOPHIL # BLD AUTO: 0.5 K/UL (ref 0–0.5)
EOSINOPHIL NFR BLD: 7.6 % (ref 0–8)
ERYTHROCYTE [DISTWIDTH] IN BLOOD BY AUTOMATED COUNT: 12.9 % (ref 11.5–14.5)
EST. GFR  (AFRICAN AMERICAN): >60 ML/MIN/1.73 M^2
EST. GFR  (NON AFRICAN AMERICAN): >60 ML/MIN/1.73 M^2
GLUCOSE SERPL-MCNC: 106 MG/DL (ref 70–110)
HCT VFR BLD AUTO: 30.4 % (ref 37–48.5)
HGB BLD-MCNC: 10.2 G/DL (ref 12–16)
IMM GRANULOCYTES # BLD AUTO: 0.04 K/UL (ref 0–0.04)
IMM GRANULOCYTES NFR BLD AUTO: 0.6 % (ref 0–0.5)
LYMPHOCYTES # BLD AUTO: 2.7 K/UL (ref 1–4.8)
LYMPHOCYTES NFR BLD: 42.3 % (ref 18–48)
MAGNESIUM SERPL-MCNC: 1.7 MG/DL (ref 1.6–2.6)
MCH RBC QN AUTO: 29.2 PG (ref 27–31)
MCHC RBC AUTO-ENTMCNC: 33.6 G/DL (ref 32–36)
MCV RBC AUTO: 87 FL (ref 82–98)
MONOCYTES # BLD AUTO: 0.6 K/UL (ref 0.3–1)
MONOCYTES NFR BLD: 9.2 % (ref 4–15)
NEUTROPHILS # BLD AUTO: 2.5 K/UL (ref 1.8–7.7)
NEUTROPHILS NFR BLD: 39.7 % (ref 38–73)
NRBC BLD-RTO: 0 /100 WBC
PHOSPHATE SERPL-MCNC: 3.7 MG/DL (ref 2.7–4.5)
PLATELET # BLD AUTO: 237 K/UL (ref 150–450)
PMV BLD AUTO: 10.7 FL (ref 9.2–12.9)
POCT GLUCOSE: 127 MG/DL (ref 70–110)
POCT GLUCOSE: 235 MG/DL (ref 70–110)
POCT GLUCOSE: 257 MG/DL (ref 70–110)
POCT GLUCOSE: 258 MG/DL (ref 70–110)
POTASSIUM SERPL-SCNC: 3.4 MMOL/L (ref 3.5–5.1)
PROT SERPL-MCNC: 6.7 G/DL (ref 6–8.4)
RBC # BLD AUTO: 3.49 M/UL (ref 4–5.4)
SODIUM SERPL-SCNC: 138 MMOL/L (ref 136–145)
WBC # BLD AUTO: 6.31 K/UL (ref 3.9–12.7)

## 2021-07-15 PROCEDURE — 97535 SELF CARE MNGMENT TRAINING: CPT

## 2021-07-15 PROCEDURE — 97165 OT EVAL LOW COMPLEX 30 MIN: CPT

## 2021-07-15 PROCEDURE — 85025 COMPLETE CBC W/AUTO DIFF WBC: CPT | Performed by: STUDENT IN AN ORGANIZED HEALTH CARE EDUCATION/TRAINING PROGRAM

## 2021-07-15 PROCEDURE — 63600175 PHARM REV CODE 636 W HCPCS: Performed by: STUDENT IN AN ORGANIZED HEALTH CARE EDUCATION/TRAINING PROGRAM

## 2021-07-15 PROCEDURE — 97530 THERAPEUTIC ACTIVITIES: CPT

## 2021-07-15 PROCEDURE — 97162 PT EVAL MOD COMPLEX 30 MIN: CPT

## 2021-07-15 PROCEDURE — 36415 COLL VENOUS BLD VENIPUNCTURE: CPT | Performed by: STUDENT IN AN ORGANIZED HEALTH CARE EDUCATION/TRAINING PROGRAM

## 2021-07-15 PROCEDURE — 83735 ASSAY OF MAGNESIUM: CPT | Performed by: STUDENT IN AN ORGANIZED HEALTH CARE EDUCATION/TRAINING PROGRAM

## 2021-07-15 PROCEDURE — 97116 GAIT TRAINING THERAPY: CPT

## 2021-07-15 PROCEDURE — 25000003 PHARM REV CODE 250: Performed by: NURSE PRACTITIONER

## 2021-07-15 PROCEDURE — 11000004 HC SNF PRIVATE

## 2021-07-15 PROCEDURE — 84100 ASSAY OF PHOSPHORUS: CPT | Performed by: STUDENT IN AN ORGANIZED HEALTH CARE EDUCATION/TRAINING PROGRAM

## 2021-07-15 PROCEDURE — 25000003 PHARM REV CODE 250: Performed by: STUDENT IN AN ORGANIZED HEALTH CARE EDUCATION/TRAINING PROGRAM

## 2021-07-15 PROCEDURE — 80053 COMPREHEN METABOLIC PANEL: CPT | Performed by: STUDENT IN AN ORGANIZED HEALTH CARE EDUCATION/TRAINING PROGRAM

## 2021-07-15 RX ORDER — POTASSIUM CHLORIDE 20 MEQ/1
40 TABLET, EXTENDED RELEASE ORAL ONCE
Status: COMPLETED | OUTPATIENT
Start: 2021-07-15 | End: 2021-07-15

## 2021-07-15 RX ORDER — ONDANSETRON 4 MG/1
4 TABLET, ORALLY DISINTEGRATING ORAL EVERY 8 HOURS PRN
Status: DISCONTINUED | OUTPATIENT
Start: 2021-07-15 | End: 2021-07-29 | Stop reason: HOSPADM

## 2021-07-15 RX ADMIN — MELATONIN TAB 3 MG 9 MG: 3 TAB at 09:07

## 2021-07-15 RX ADMIN — INSULIN DETEMIR 14 UNITS: 100 INJECTION, SOLUTION SUBCUTANEOUS at 09:07

## 2021-07-15 RX ADMIN — INSULIN ASPART 4 UNITS: 100 INJECTION, SOLUTION INTRAVENOUS; SUBCUTANEOUS at 05:07

## 2021-07-15 RX ADMIN — AMLODIPINE BESYLATE 5 MG: 5 TABLET ORAL at 09:07

## 2021-07-15 RX ADMIN — ATORVASTATIN CALCIUM 20 MG: 20 TABLET, FILM COATED ORAL at 09:07

## 2021-07-15 RX ADMIN — INSULIN ASPART 3 UNITS: 100 INJECTION, SOLUTION INTRAVENOUS; SUBCUTANEOUS at 09:07

## 2021-07-15 RX ADMIN — DOCUSATE SODIUM 50 MG AND SENNOSIDES 8.6 MG 1 TABLET: 8.6; 5 TABLET, FILM COATED ORAL at 08:07

## 2021-07-15 RX ADMIN — POTASSIUM CHLORIDE 40 MEQ: 1500 TABLET, EXTENDED RELEASE ORAL at 06:07

## 2021-07-15 RX ADMIN — PANTOPRAZOLE SODIUM 40 MG: 40 TABLET, DELAYED RELEASE ORAL at 08:07

## 2021-07-15 RX ADMIN — HYDROCHLOROTHIAZIDE 25 MG: 12.5 TABLET ORAL at 08:07

## 2021-07-15 RX ADMIN — ENOXAPARIN SODIUM 40 MG: 40 INJECTION SUBCUTANEOUS at 05:07

## 2021-07-15 RX ADMIN — INSULIN ASPART 6 UNITS: 100 INJECTION, SOLUTION INTRAVENOUS; SUBCUTANEOUS at 12:07

## 2021-07-16 LAB
ALBUMIN SERPL BCP-MCNC: 3 G/DL (ref 3.5–5.2)
ALP SERPL-CCNC: 57 U/L (ref 55–135)
ALT SERPL W/O P-5'-P-CCNC: 13 U/L (ref 10–44)
ANION GAP SERPL CALC-SCNC: 9 MMOL/L (ref 8–16)
AST SERPL-CCNC: 18 U/L (ref 10–40)
BASOPHILS # BLD AUTO: 0.06 K/UL (ref 0–0.2)
BASOPHILS NFR BLD: 1 % (ref 0–1.9)
BILIRUB SERPL-MCNC: 0.7 MG/DL (ref 0.1–1)
BUN SERPL-MCNC: 19 MG/DL (ref 8–23)
CALCIUM SERPL-MCNC: 9.4 MG/DL (ref 8.7–10.5)
CHLORIDE SERPL-SCNC: 102 MMOL/L (ref 95–110)
CO2 SERPL-SCNC: 28 MMOL/L (ref 23–29)
CREAT SERPL-MCNC: 0.8 MG/DL (ref 0.5–1.4)
DIFFERENTIAL METHOD: ABNORMAL
EOSINOPHIL # BLD AUTO: 0.4 K/UL (ref 0–0.5)
EOSINOPHIL NFR BLD: 7.1 % (ref 0–8)
ERYTHROCYTE [DISTWIDTH] IN BLOOD BY AUTOMATED COUNT: 13 % (ref 11.5–14.5)
EST. GFR  (AFRICAN AMERICAN): >60 ML/MIN/1.73 M^2
EST. GFR  (NON AFRICAN AMERICAN): >60 ML/MIN/1.73 M^2
GLUCOSE SERPL-MCNC: 123 MG/DL (ref 70–110)
HCT VFR BLD AUTO: 31.3 % (ref 37–48.5)
HGB BLD-MCNC: 11 G/DL (ref 12–16)
IMM GRANULOCYTES # BLD AUTO: 0.04 K/UL (ref 0–0.04)
IMM GRANULOCYTES NFR BLD AUTO: 0.7 % (ref 0–0.5)
LYMPHOCYTES # BLD AUTO: 1.9 K/UL (ref 1–4.8)
LYMPHOCYTES NFR BLD: 31.7 % (ref 18–48)
MAGNESIUM SERPL-MCNC: 1.7 MG/DL (ref 1.6–2.6)
MCH RBC QN AUTO: 30.3 PG (ref 27–31)
MCHC RBC AUTO-ENTMCNC: 35.1 G/DL (ref 32–36)
MCV RBC AUTO: 86 FL (ref 82–98)
MONOCYTES # BLD AUTO: 0.6 K/UL (ref 0.3–1)
MONOCYTES NFR BLD: 9.3 % (ref 4–15)
NEUTROPHILS # BLD AUTO: 3 K/UL (ref 1.8–7.7)
NEUTROPHILS NFR BLD: 50.2 % (ref 38–73)
NRBC BLD-RTO: 0 /100 WBC
PHOSPHATE SERPL-MCNC: 3.2 MG/DL (ref 2.7–4.5)
PLATELET # BLD AUTO: 254 K/UL (ref 150–450)
PMV BLD AUTO: 10.4 FL (ref 9.2–12.9)
POCT GLUCOSE: 141 MG/DL (ref 70–110)
POCT GLUCOSE: 213 MG/DL (ref 70–110)
POCT GLUCOSE: 244 MG/DL (ref 70–110)
POTASSIUM SERPL-SCNC: 3.6 MMOL/L (ref 3.5–5.1)
PROT SERPL-MCNC: 6.7 G/DL (ref 6–8.4)
RBC # BLD AUTO: 3.63 M/UL (ref 4–5.4)
SODIUM SERPL-SCNC: 139 MMOL/L (ref 136–145)
WBC # BLD AUTO: 5.89 K/UL (ref 3.9–12.7)

## 2021-07-16 PROCEDURE — 83735 ASSAY OF MAGNESIUM: CPT | Performed by: STUDENT IN AN ORGANIZED HEALTH CARE EDUCATION/TRAINING PROGRAM

## 2021-07-16 PROCEDURE — 97110 THERAPEUTIC EXERCISES: CPT | Mod: CO

## 2021-07-16 PROCEDURE — 97116 GAIT TRAINING THERAPY: CPT | Mod: CQ

## 2021-07-16 PROCEDURE — 63600175 PHARM REV CODE 636 W HCPCS: Performed by: STUDENT IN AN ORGANIZED HEALTH CARE EDUCATION/TRAINING PROGRAM

## 2021-07-16 PROCEDURE — 80053 COMPREHEN METABOLIC PANEL: CPT | Performed by: STUDENT IN AN ORGANIZED HEALTH CARE EDUCATION/TRAINING PROGRAM

## 2021-07-16 PROCEDURE — 36415 COLL VENOUS BLD VENIPUNCTURE: CPT | Performed by: STUDENT IN AN ORGANIZED HEALTH CARE EDUCATION/TRAINING PROGRAM

## 2021-07-16 PROCEDURE — 25000003 PHARM REV CODE 250: Performed by: STUDENT IN AN ORGANIZED HEALTH CARE EDUCATION/TRAINING PROGRAM

## 2021-07-16 PROCEDURE — 11000004 HC SNF PRIVATE

## 2021-07-16 PROCEDURE — 97530 THERAPEUTIC ACTIVITIES: CPT | Mod: CQ

## 2021-07-16 PROCEDURE — 97535 SELF CARE MNGMENT TRAINING: CPT | Mod: CO

## 2021-07-16 PROCEDURE — 85025 COMPLETE CBC W/AUTO DIFF WBC: CPT | Performed by: STUDENT IN AN ORGANIZED HEALTH CARE EDUCATION/TRAINING PROGRAM

## 2021-07-16 PROCEDURE — 84100 ASSAY OF PHOSPHORUS: CPT | Performed by: STUDENT IN AN ORGANIZED HEALTH CARE EDUCATION/TRAINING PROGRAM

## 2021-07-16 RX ORDER — TRAMADOL HYDROCHLORIDE 50 MG/1
50 TABLET ORAL EVERY 6 HOURS PRN
Status: DISCONTINUED | OUTPATIENT
Start: 2021-07-16 | End: 2021-07-29 | Stop reason: HOSPADM

## 2021-07-16 RX ADMIN — ACETAMINOPHEN 650 MG: 325 TABLET ORAL at 05:07

## 2021-07-16 RX ADMIN — AMLODIPINE BESYLATE 5 MG: 5 TABLET ORAL at 08:07

## 2021-07-16 RX ADMIN — MELATONIN TAB 3 MG 9 MG: 3 TAB at 08:07

## 2021-07-16 RX ADMIN — ENOXAPARIN SODIUM 40 MG: 40 INJECTION SUBCUTANEOUS at 05:07

## 2021-07-16 RX ADMIN — DOCUSATE SODIUM 50 MG AND SENNOSIDES 8.6 MG 1 TABLET: 8.6; 5 TABLET, FILM COATED ORAL at 08:07

## 2021-07-16 RX ADMIN — DOCUSATE SODIUM 50 MG AND SENNOSIDES 8.6 MG 1 TABLET: 8.6; 5 TABLET, FILM COATED ORAL at 09:07

## 2021-07-16 RX ADMIN — ACETAMINOPHEN 650 MG: 325 TABLET ORAL at 01:07

## 2021-07-16 RX ADMIN — INSULIN ASPART 2 UNITS: 100 INJECTION, SOLUTION INTRAVENOUS; SUBCUTANEOUS at 08:07

## 2021-07-16 RX ADMIN — HYDROCHLOROTHIAZIDE 25 MG: 12.5 TABLET ORAL at 09:07

## 2021-07-16 RX ADMIN — PANTOPRAZOLE SODIUM 40 MG: 40 TABLET, DELAYED RELEASE ORAL at 09:07

## 2021-07-16 RX ADMIN — INSULIN ASPART 4 UNITS: 100 INJECTION, SOLUTION INTRAVENOUS; SUBCUTANEOUS at 05:07

## 2021-07-16 RX ADMIN — ATORVASTATIN CALCIUM 20 MG: 20 TABLET, FILM COATED ORAL at 08:07

## 2021-07-16 RX ADMIN — INSULIN ASPART 4 UNITS: 100 INJECTION, SOLUTION INTRAVENOUS; SUBCUTANEOUS at 12:07

## 2021-07-17 PROBLEM — D62 ACUTE BLOOD LOSS ANEMIA: Status: ACTIVE | Noted: 2021-07-17

## 2021-07-17 PROBLEM — Z96.641 STATUS POST TOTAL REPLACEMENT OF RIGHT HIP: Status: ACTIVE | Noted: 2021-07-17

## 2021-07-17 LAB
ALBUMIN SERPL BCP-MCNC: 3.2 G/DL (ref 3.5–5.2)
ALP SERPL-CCNC: 57 U/L (ref 55–135)
ALT SERPL W/O P-5'-P-CCNC: 15 U/L (ref 10–44)
ANION GAP SERPL CALC-SCNC: 8 MMOL/L (ref 8–16)
AST SERPL-CCNC: 21 U/L (ref 10–40)
BASOPHILS # BLD AUTO: 0.05 K/UL (ref 0–0.2)
BASOPHILS NFR BLD: 0.8 % (ref 0–1.9)
BILIRUB SERPL-MCNC: 0.7 MG/DL (ref 0.1–1)
BUN SERPL-MCNC: 19 MG/DL (ref 8–23)
CALCIUM SERPL-MCNC: 9.6 MG/DL (ref 8.7–10.5)
CHLORIDE SERPL-SCNC: 103 MMOL/L (ref 95–110)
CO2 SERPL-SCNC: 30 MMOL/L (ref 23–29)
CREAT SERPL-MCNC: 0.8 MG/DL (ref 0.5–1.4)
DIFFERENTIAL METHOD: ABNORMAL
EOSINOPHIL # BLD AUTO: 0.5 K/UL (ref 0–0.5)
EOSINOPHIL NFR BLD: 8.1 % (ref 0–8)
ERYTHROCYTE [DISTWIDTH] IN BLOOD BY AUTOMATED COUNT: 13.1 % (ref 11.5–14.5)
EST. GFR  (AFRICAN AMERICAN): >60 ML/MIN/1.73 M^2
EST. GFR  (NON AFRICAN AMERICAN): >60 ML/MIN/1.73 M^2
GLUCOSE SERPL-MCNC: 94 MG/DL (ref 70–110)
HCT VFR BLD AUTO: 32.8 % (ref 37–48.5)
HGB BLD-MCNC: 10.7 G/DL (ref 12–16)
IMM GRANULOCYTES # BLD AUTO: 0.04 K/UL (ref 0–0.04)
IMM GRANULOCYTES NFR BLD AUTO: 0.6 % (ref 0–0.5)
LYMPHOCYTES # BLD AUTO: 2.4 K/UL (ref 1–4.8)
LYMPHOCYTES NFR BLD: 36.8 % (ref 18–48)
MAGNESIUM SERPL-MCNC: 1.9 MG/DL (ref 1.6–2.6)
MCH RBC QN AUTO: 29.3 PG (ref 27–31)
MCHC RBC AUTO-ENTMCNC: 32.6 G/DL (ref 32–36)
MCV RBC AUTO: 90 FL (ref 82–98)
MONOCYTES # BLD AUTO: 0.6 K/UL (ref 0.3–1)
MONOCYTES NFR BLD: 8.4 % (ref 4–15)
NEUTROPHILS # BLD AUTO: 3 K/UL (ref 1.8–7.7)
NEUTROPHILS NFR BLD: 45.3 % (ref 38–73)
NRBC BLD-RTO: 0 /100 WBC
PHOSPHATE SERPL-MCNC: 4.1 MG/DL (ref 2.7–4.5)
PLATELET # BLD AUTO: 294 K/UL (ref 150–450)
PMV BLD AUTO: 10 FL (ref 9.2–12.9)
POCT GLUCOSE: 104 MG/DL (ref 70–110)
POCT GLUCOSE: 202 MG/DL (ref 70–110)
POCT GLUCOSE: 208 MG/DL (ref 70–110)
POCT GLUCOSE: 220 MG/DL (ref 70–110)
POCT GLUCOSE: 264 MG/DL (ref 70–110)
POTASSIUM SERPL-SCNC: 3.8 MMOL/L (ref 3.5–5.1)
PROT SERPL-MCNC: 7 G/DL (ref 6–8.4)
RBC # BLD AUTO: 3.65 M/UL (ref 4–5.4)
SODIUM SERPL-SCNC: 141 MMOL/L (ref 136–145)
WBC # BLD AUTO: 6.53 K/UL (ref 3.9–12.7)

## 2021-07-17 PROCEDURE — 83735 ASSAY OF MAGNESIUM: CPT | Performed by: STUDENT IN AN ORGANIZED HEALTH CARE EDUCATION/TRAINING PROGRAM

## 2021-07-17 PROCEDURE — 25000003 PHARM REV CODE 250: Performed by: HOSPITALIST

## 2021-07-17 PROCEDURE — 84100 ASSAY OF PHOSPHORUS: CPT | Performed by: STUDENT IN AN ORGANIZED HEALTH CARE EDUCATION/TRAINING PROGRAM

## 2021-07-17 PROCEDURE — 97116 GAIT TRAINING THERAPY: CPT | Mod: CQ

## 2021-07-17 PROCEDURE — 25000003 PHARM REV CODE 250: Performed by: NURSE PRACTITIONER

## 2021-07-17 PROCEDURE — 99305 1ST NF CARE MODERATE MDM 35: CPT | Mod: AI,,, | Performed by: HOSPITALIST

## 2021-07-17 PROCEDURE — 63600175 PHARM REV CODE 636 W HCPCS: Performed by: STUDENT IN AN ORGANIZED HEALTH CARE EDUCATION/TRAINING PROGRAM

## 2021-07-17 PROCEDURE — 11000004 HC SNF PRIVATE

## 2021-07-17 PROCEDURE — 97530 THERAPEUTIC ACTIVITIES: CPT

## 2021-07-17 PROCEDURE — 97530 THERAPEUTIC ACTIVITIES: CPT | Mod: CQ

## 2021-07-17 PROCEDURE — 80053 COMPREHEN METABOLIC PANEL: CPT | Performed by: STUDENT IN AN ORGANIZED HEALTH CARE EDUCATION/TRAINING PROGRAM

## 2021-07-17 PROCEDURE — 85025 COMPLETE CBC W/AUTO DIFF WBC: CPT | Performed by: STUDENT IN AN ORGANIZED HEALTH CARE EDUCATION/TRAINING PROGRAM

## 2021-07-17 PROCEDURE — 97110 THERAPEUTIC EXERCISES: CPT

## 2021-07-17 PROCEDURE — 25000003 PHARM REV CODE 250: Performed by: STUDENT IN AN ORGANIZED HEALTH CARE EDUCATION/TRAINING PROGRAM

## 2021-07-17 PROCEDURE — 99305 PR NURSING FACILITY CARE, INIT, MOD SEVERITY: ICD-10-PCS | Mod: AI,,, | Performed by: HOSPITALIST

## 2021-07-17 PROCEDURE — 97110 THERAPEUTIC EXERCISES: CPT | Mod: CQ

## 2021-07-17 RX ORDER — MICONAZOLE NITRATE 2 %
POWDER (GRAM) TOPICAL 2 TIMES DAILY
Status: DISCONTINUED | OUTPATIENT
Start: 2021-07-17 | End: 2021-07-29 | Stop reason: HOSPADM

## 2021-07-17 RX ORDER — DIAZEPAM 5 MG/1
5 TABLET ORAL NIGHTLY PRN
Status: DISCONTINUED | OUTPATIENT
Start: 2021-07-17 | End: 2021-07-29 | Stop reason: HOSPADM

## 2021-07-17 RX ORDER — TALC
9 POWDER (GRAM) TOPICAL NIGHTLY
Status: DISCONTINUED | OUTPATIENT
Start: 2021-07-17 | End: 2021-07-29 | Stop reason: HOSPADM

## 2021-07-17 RX ADMIN — HYDROCHLOROTHIAZIDE 25 MG: 12.5 TABLET ORAL at 09:07

## 2021-07-17 RX ADMIN — ACETAMINOPHEN 650 MG: 325 TABLET ORAL at 12:07

## 2021-07-17 RX ADMIN — MELATONIN TAB 3 MG 9 MG: 3 TAB at 08:07

## 2021-07-17 RX ADMIN — DOCUSATE SODIUM 50 MG AND SENNOSIDES 8.6 MG 1 TABLET: 8.6; 5 TABLET, FILM COATED ORAL at 09:07

## 2021-07-17 RX ADMIN — TRAMADOL HYDROCHLORIDE 50 MG: 50 TABLET, COATED ORAL at 08:07

## 2021-07-17 RX ADMIN — DOCUSATE SODIUM 50 MG AND SENNOSIDES 8.6 MG 1 TABLET: 8.6; 5 TABLET, FILM COATED ORAL at 08:07

## 2021-07-17 RX ADMIN — ENOXAPARIN SODIUM 40 MG: 40 INJECTION SUBCUTANEOUS at 05:07

## 2021-07-17 RX ADMIN — INSULIN ASPART 4 UNITS: 100 INJECTION, SOLUTION INTRAVENOUS; SUBCUTANEOUS at 12:07

## 2021-07-17 RX ADMIN — INSULIN ASPART 6 UNITS: 100 INJECTION, SOLUTION INTRAVENOUS; SUBCUTANEOUS at 08:07

## 2021-07-17 RX ADMIN — ATORVASTATIN CALCIUM 20 MG: 20 TABLET, FILM COATED ORAL at 08:07

## 2021-07-17 RX ADMIN — PANTOPRAZOLE SODIUM 40 MG: 40 TABLET, DELAYED RELEASE ORAL at 09:07

## 2021-07-17 RX ADMIN — AMLODIPINE BESYLATE 5 MG: 5 TABLET ORAL at 08:07

## 2021-07-17 RX ADMIN — INSULIN ASPART 6 UNITS: 100 INJECTION, SOLUTION INTRAVENOUS; SUBCUTANEOUS at 05:07

## 2021-07-18 LAB
POCT GLUCOSE: 121 MG/DL (ref 70–110)
POCT GLUCOSE: 262 MG/DL (ref 70–110)
POCT GLUCOSE: 273 MG/DL (ref 70–110)
POCT GLUCOSE: 274 MG/DL (ref 70–110)

## 2021-07-18 PROCEDURE — 97116 GAIT TRAINING THERAPY: CPT | Mod: CQ

## 2021-07-18 PROCEDURE — 25000003 PHARM REV CODE 250: Performed by: HOSPITALIST

## 2021-07-18 PROCEDURE — 63600175 PHARM REV CODE 636 W HCPCS: Performed by: STUDENT IN AN ORGANIZED HEALTH CARE EDUCATION/TRAINING PROGRAM

## 2021-07-18 PROCEDURE — 11000004 HC SNF PRIVATE

## 2021-07-18 PROCEDURE — 97530 THERAPEUTIC ACTIVITIES: CPT | Mod: CQ

## 2021-07-18 PROCEDURE — 25000003 PHARM REV CODE 250: Performed by: NURSE PRACTITIONER

## 2021-07-18 PROCEDURE — 97110 THERAPEUTIC EXERCISES: CPT | Mod: CQ

## 2021-07-18 PROCEDURE — 25000003 PHARM REV CODE 250: Performed by: STUDENT IN AN ORGANIZED HEALTH CARE EDUCATION/TRAINING PROGRAM

## 2021-07-18 RX ADMIN — MICONAZOLE NITRATE: 20 POWDER TOPICAL at 08:07

## 2021-07-18 RX ADMIN — HYDROCHLOROTHIAZIDE 25 MG: 12.5 TABLET ORAL at 09:07

## 2021-07-18 RX ADMIN — MELATONIN TAB 3 MG 9 MG: 3 TAB at 08:07

## 2021-07-18 RX ADMIN — INSULIN ASPART 3 UNITS: 100 INJECTION, SOLUTION INTRAVENOUS; SUBCUTANEOUS at 08:07

## 2021-07-18 RX ADMIN — DOCUSATE SODIUM 50 MG AND SENNOSIDES 8.6 MG 1 TABLET: 8.6; 5 TABLET, FILM COATED ORAL at 08:07

## 2021-07-18 RX ADMIN — INSULIN ASPART 6 UNITS: 100 INJECTION, SOLUTION INTRAVENOUS; SUBCUTANEOUS at 12:07

## 2021-07-18 RX ADMIN — TRAMADOL HYDROCHLORIDE 50 MG: 50 TABLET, COATED ORAL at 06:07

## 2021-07-18 RX ADMIN — INSULIN ASPART 6 UNITS: 100 INJECTION, SOLUTION INTRAVENOUS; SUBCUTANEOUS at 05:07

## 2021-07-18 RX ADMIN — AMLODIPINE BESYLATE 5 MG: 5 TABLET ORAL at 08:07

## 2021-07-18 RX ADMIN — ENOXAPARIN SODIUM 40 MG: 40 INJECTION SUBCUTANEOUS at 05:07

## 2021-07-18 RX ADMIN — PANTOPRAZOLE SODIUM 40 MG: 40 TABLET, DELAYED RELEASE ORAL at 09:07

## 2021-07-18 RX ADMIN — DOCUSATE SODIUM 50 MG AND SENNOSIDES 8.6 MG 1 TABLET: 8.6; 5 TABLET, FILM COATED ORAL at 09:07

## 2021-07-18 RX ADMIN — TRAMADOL HYDROCHLORIDE 50 MG: 50 TABLET, COATED ORAL at 08:07

## 2021-07-18 RX ADMIN — ATORVASTATIN CALCIUM 20 MG: 20 TABLET, FILM COATED ORAL at 08:07

## 2021-07-19 LAB
ANION GAP SERPL CALC-SCNC: 10 MMOL/L (ref 8–16)
BUN SERPL-MCNC: 18 MG/DL (ref 8–23)
CALCIUM SERPL-MCNC: 9.4 MG/DL (ref 8.7–10.5)
CHLORIDE SERPL-SCNC: 103 MMOL/L (ref 95–110)
CO2 SERPL-SCNC: 28 MMOL/L (ref 23–29)
CREAT SERPL-MCNC: 0.7 MG/DL (ref 0.5–1.4)
ERYTHROCYTE [DISTWIDTH] IN BLOOD BY AUTOMATED COUNT: 13.3 % (ref 11.5–14.5)
EST. GFR  (AFRICAN AMERICAN): >60 ML/MIN/1.73 M^2
EST. GFR  (NON AFRICAN AMERICAN): >60 ML/MIN/1.73 M^2
FINAL PATHOLOGIC DIAGNOSIS: NORMAL
GLUCOSE SERPL-MCNC: 74 MG/DL (ref 70–110)
GROSS: NORMAL
HCT VFR BLD AUTO: 31.1 % (ref 37–48.5)
HGB BLD-MCNC: 10.1 G/DL (ref 12–16)
Lab: NORMAL
MAGNESIUM SERPL-MCNC: 1.9 MG/DL (ref 1.6–2.6)
MCH RBC QN AUTO: 29.7 PG (ref 27–31)
MCHC RBC AUTO-ENTMCNC: 32.5 G/DL (ref 32–36)
MCV RBC AUTO: 92 FL (ref 82–98)
PHOSPHATE SERPL-MCNC: 3.7 MG/DL (ref 2.7–4.5)
PLATELET # BLD AUTO: 298 K/UL (ref 150–450)
PMV BLD AUTO: 10.3 FL (ref 9.2–12.9)
POCT GLUCOSE: 116 MG/DL (ref 70–110)
POCT GLUCOSE: 229 MG/DL (ref 70–110)
POCT GLUCOSE: 314 MG/DL (ref 70–110)
POCT GLUCOSE: 95 MG/DL (ref 70–110)
POTASSIUM SERPL-SCNC: 3.4 MMOL/L (ref 3.5–5.1)
RBC # BLD AUTO: 3.4 M/UL (ref 4–5.4)
SODIUM SERPL-SCNC: 141 MMOL/L (ref 136–145)
WBC # BLD AUTO: 6.21 K/UL (ref 3.9–12.7)

## 2021-07-19 PROCEDURE — 36415 COLL VENOUS BLD VENIPUNCTURE: CPT | Performed by: HOSPITALIST

## 2021-07-19 PROCEDURE — 97110 THERAPEUTIC EXERCISES: CPT | Mod: CO

## 2021-07-19 PROCEDURE — 97110 THERAPEUTIC EXERCISES: CPT

## 2021-07-19 PROCEDURE — 80048 BASIC METABOLIC PNL TOTAL CA: CPT | Performed by: HOSPITALIST

## 2021-07-19 PROCEDURE — 97116 GAIT TRAINING THERAPY: CPT

## 2021-07-19 PROCEDURE — 25000003 PHARM REV CODE 250: Performed by: HOSPITALIST

## 2021-07-19 PROCEDURE — 97535 SELF CARE MNGMENT TRAINING: CPT | Mod: CO

## 2021-07-19 PROCEDURE — 25000003 PHARM REV CODE 250: Performed by: NURSE PRACTITIONER

## 2021-07-19 PROCEDURE — 63600175 PHARM REV CODE 636 W HCPCS: Performed by: STUDENT IN AN ORGANIZED HEALTH CARE EDUCATION/TRAINING PROGRAM

## 2021-07-19 PROCEDURE — 84100 ASSAY OF PHOSPHORUS: CPT | Performed by: HOSPITALIST

## 2021-07-19 PROCEDURE — 83735 ASSAY OF MAGNESIUM: CPT | Performed by: HOSPITALIST

## 2021-07-19 PROCEDURE — 25000003 PHARM REV CODE 250: Performed by: STUDENT IN AN ORGANIZED HEALTH CARE EDUCATION/TRAINING PROGRAM

## 2021-07-19 PROCEDURE — 85027 COMPLETE CBC AUTOMATED: CPT | Performed by: HOSPITALIST

## 2021-07-19 PROCEDURE — 11000004 HC SNF PRIVATE

## 2021-07-19 RX ORDER — ACETAMINOPHEN 500 MG
1000 TABLET ORAL EVERY 8 HOURS
Status: DISCONTINUED | OUTPATIENT
Start: 2021-07-19 | End: 2021-07-29 | Stop reason: HOSPADM

## 2021-07-19 RX ORDER — POTASSIUM CHLORIDE 20 MEQ/1
40 TABLET, EXTENDED RELEASE ORAL ONCE
Status: COMPLETED | OUTPATIENT
Start: 2021-07-19 | End: 2021-07-19

## 2021-07-19 RX ORDER — METFORMIN HYDROCHLORIDE 500 MG/1
500 TABLET ORAL 2 TIMES DAILY WITH MEALS
Status: DISCONTINUED | OUTPATIENT
Start: 2021-07-19 | End: 2021-07-29 | Stop reason: HOSPADM

## 2021-07-19 RX ORDER — ALUMINUM HYDROXIDE, MAGNESIUM HYDROXIDE, AND SIMETHICONE 2400; 240; 2400 MG/30ML; MG/30ML; MG/30ML
30 SUSPENSION ORAL EVERY 6 HOURS PRN
Status: DISCONTINUED | OUTPATIENT
Start: 2021-07-19 | End: 2021-07-29 | Stop reason: HOSPADM

## 2021-07-19 RX ADMIN — ENOXAPARIN SODIUM 40 MG: 40 INJECTION SUBCUTANEOUS at 05:07

## 2021-07-19 RX ADMIN — MELATONIN TAB 3 MG 9 MG: 3 TAB at 09:07

## 2021-07-19 RX ADMIN — DOCUSATE SODIUM 50 MG AND SENNOSIDES 8.6 MG 1 TABLET: 8.6; 5 TABLET, FILM COATED ORAL at 08:07

## 2021-07-19 RX ADMIN — MICONAZOLE NITRATE: 20 POWDER TOPICAL at 09:07

## 2021-07-19 RX ADMIN — PANTOPRAZOLE SODIUM 40 MG: 40 TABLET, DELAYED RELEASE ORAL at 08:07

## 2021-07-19 RX ADMIN — INSULIN ASPART 4 UNITS: 100 INJECTION, SOLUTION INTRAVENOUS; SUBCUTANEOUS at 11:07

## 2021-07-19 RX ADMIN — TRAMADOL HYDROCHLORIDE 50 MG: 50 TABLET, COATED ORAL at 09:07

## 2021-07-19 RX ADMIN — HYDROCHLOROTHIAZIDE 25 MG: 12.5 TABLET ORAL at 08:07

## 2021-07-19 RX ADMIN — AMLODIPINE BESYLATE 5 MG: 5 TABLET ORAL at 09:07

## 2021-07-19 RX ADMIN — ACETAMINOPHEN 1000 MG: 500 TABLET, FILM COATED ORAL at 09:07

## 2021-07-19 RX ADMIN — METFORMIN HYDROCHLORIDE 500 MG: 500 TABLET ORAL at 05:07

## 2021-07-19 RX ADMIN — INSULIN ASPART 8 UNITS: 100 INJECTION, SOLUTION INTRAVENOUS; SUBCUTANEOUS at 05:07

## 2021-07-19 RX ADMIN — POTASSIUM CHLORIDE 40 MEQ: 1500 TABLET, EXTENDED RELEASE ORAL at 05:07

## 2021-07-19 RX ADMIN — ATORVASTATIN CALCIUM 20 MG: 20 TABLET, FILM COATED ORAL at 09:07

## 2021-07-19 RX ADMIN — TRAMADOL HYDROCHLORIDE 50 MG: 50 TABLET, COATED ORAL at 01:07

## 2021-07-20 LAB
POCT GLUCOSE: 164 MG/DL (ref 70–110)
POCT GLUCOSE: 193 MG/DL (ref 70–110)
POCT GLUCOSE: 240 MG/DL (ref 70–110)
POCT GLUCOSE: 311 MG/DL (ref 70–110)

## 2021-07-20 PROCEDURE — 25000003 PHARM REV CODE 250: Performed by: NURSE PRACTITIONER

## 2021-07-20 PROCEDURE — 97110 THERAPEUTIC EXERCISES: CPT | Mod: CQ

## 2021-07-20 PROCEDURE — 25000003 PHARM REV CODE 250: Performed by: STUDENT IN AN ORGANIZED HEALTH CARE EDUCATION/TRAINING PROGRAM

## 2021-07-20 PROCEDURE — 63600175 PHARM REV CODE 636 W HCPCS: Performed by: STUDENT IN AN ORGANIZED HEALTH CARE EDUCATION/TRAINING PROGRAM

## 2021-07-20 PROCEDURE — 97110 THERAPEUTIC EXERCISES: CPT | Mod: CO

## 2021-07-20 PROCEDURE — 97116 GAIT TRAINING THERAPY: CPT | Mod: CQ

## 2021-07-20 PROCEDURE — 11000004 HC SNF PRIVATE

## 2021-07-20 PROCEDURE — 25000003 PHARM REV CODE 250: Performed by: HOSPITALIST

## 2021-07-20 RX ORDER — LISINOPRIL 2.5 MG/1
5 TABLET ORAL DAILY
Status: DISCONTINUED | OUTPATIENT
Start: 2021-07-21 | End: 2021-07-29 | Stop reason: HOSPADM

## 2021-07-20 RX ADMIN — INSULIN ASPART 2 UNITS: 100 INJECTION, SOLUTION INTRAVENOUS; SUBCUTANEOUS at 09:07

## 2021-07-20 RX ADMIN — TRAMADOL HYDROCHLORIDE 50 MG: 50 TABLET, COATED ORAL at 09:07

## 2021-07-20 RX ADMIN — INSULIN ASPART 8 UNITS: 100 INJECTION, SOLUTION INTRAVENOUS; SUBCUTANEOUS at 12:07

## 2021-07-20 RX ADMIN — ENOXAPARIN SODIUM 40 MG: 40 INJECTION SUBCUTANEOUS at 05:07

## 2021-07-20 RX ADMIN — MELATONIN TAB 3 MG 9 MG: 3 TAB at 09:07

## 2021-07-20 RX ADMIN — METFORMIN HYDROCHLORIDE 500 MG: 500 TABLET ORAL at 05:07

## 2021-07-20 RX ADMIN — ACETAMINOPHEN 1000 MG: 500 TABLET, FILM COATED ORAL at 09:07

## 2021-07-20 RX ADMIN — MICONAZOLE NITRATE: 20 POWDER TOPICAL at 08:07

## 2021-07-20 RX ADMIN — ATORVASTATIN CALCIUM 20 MG: 20 TABLET, FILM COATED ORAL at 09:07

## 2021-07-20 RX ADMIN — MICONAZOLE NITRATE: 20 POWDER TOPICAL at 09:07

## 2021-07-20 RX ADMIN — ACETAMINOPHEN 1000 MG: 500 TABLET, FILM COATED ORAL at 03:07

## 2021-07-20 RX ADMIN — HYDROCHLOROTHIAZIDE 25 MG: 12.5 TABLET ORAL at 08:07

## 2021-07-20 RX ADMIN — AMLODIPINE BESYLATE 5 MG: 5 TABLET ORAL at 09:07

## 2021-07-20 RX ADMIN — METFORMIN HYDROCHLORIDE 500 MG: 500 TABLET ORAL at 08:07

## 2021-07-20 RX ADMIN — INSULIN ASPART 2 UNITS: 100 INJECTION, SOLUTION INTRAVENOUS; SUBCUTANEOUS at 05:07

## 2021-07-20 RX ADMIN — TRAMADOL HYDROCHLORIDE 50 MG: 50 TABLET, COATED ORAL at 08:07

## 2021-07-20 RX ADMIN — PANTOPRAZOLE SODIUM 40 MG: 40 TABLET, DELAYED RELEASE ORAL at 08:07

## 2021-07-20 RX ADMIN — INSULIN ASPART 2 UNITS: 100 INJECTION, SOLUTION INTRAVENOUS; SUBCUTANEOUS at 08:07

## 2021-07-21 DIAGNOSIS — S72.001A CLOSED DISPLACED FRACTURE OF RIGHT FEMORAL NECK: Primary | ICD-10-CM

## 2021-07-21 LAB
BASOPHILS # BLD AUTO: 0.05 K/UL (ref 0–0.2)
BASOPHILS NFR BLD: 0.6 % (ref 0–1.9)
DIFFERENTIAL METHOD: ABNORMAL
EOSINOPHIL # BLD AUTO: 0.4 K/UL (ref 0–0.5)
EOSINOPHIL NFR BLD: 4.9 % (ref 0–8)
ERYTHROCYTE [DISTWIDTH] IN BLOOD BY AUTOMATED COUNT: 13.3 % (ref 11.5–14.5)
HCT VFR BLD AUTO: 31.3 % (ref 37–48.5)
HGB BLD-MCNC: 10.6 G/DL (ref 12–16)
IMM GRANULOCYTES # BLD AUTO: 0.03 K/UL (ref 0–0.04)
IMM GRANULOCYTES NFR BLD AUTO: 0.4 % (ref 0–0.5)
LYMPHOCYTES # BLD AUTO: 2.7 K/UL (ref 1–4.8)
LYMPHOCYTES NFR BLD: 34.4 % (ref 18–48)
MCH RBC QN AUTO: 30.1 PG (ref 27–31)
MCHC RBC AUTO-ENTMCNC: 33.9 G/DL (ref 32–36)
MCV RBC AUTO: 89 FL (ref 82–98)
MONOCYTES # BLD AUTO: 0.5 K/UL (ref 0.3–1)
MONOCYTES NFR BLD: 5.7 % (ref 4–15)
NEUTROPHILS # BLD AUTO: 4.3 K/UL (ref 1.8–7.7)
NEUTROPHILS NFR BLD: 54 % (ref 38–73)
NRBC BLD-RTO: 0 /100 WBC
PLATELET # BLD AUTO: 345 K/UL (ref 150–450)
PMV BLD AUTO: 10.1 FL (ref 9.2–12.9)
POCT GLUCOSE: 176 MG/DL (ref 70–110)
POCT GLUCOSE: 206 MG/DL (ref 70–110)
POCT GLUCOSE: 246 MG/DL (ref 70–110)
POCT GLUCOSE: 261 MG/DL (ref 70–110)
RBC # BLD AUTO: 3.52 M/UL (ref 4–5.4)
WBC # BLD AUTO: 7.96 K/UL (ref 3.9–12.7)

## 2021-07-21 PROCEDURE — 25000003 PHARM REV CODE 250: Performed by: NURSE PRACTITIONER

## 2021-07-21 PROCEDURE — 11000004 HC SNF PRIVATE

## 2021-07-21 PROCEDURE — 85025 COMPLETE CBC W/AUTO DIFF WBC: CPT | Performed by: NURSE PRACTITIONER

## 2021-07-21 PROCEDURE — 25000003 PHARM REV CODE 250: Performed by: HOSPITALIST

## 2021-07-21 PROCEDURE — 25000003 PHARM REV CODE 250: Performed by: STUDENT IN AN ORGANIZED HEALTH CARE EDUCATION/TRAINING PROGRAM

## 2021-07-21 PROCEDURE — 63600175 PHARM REV CODE 636 W HCPCS: Performed by: STUDENT IN AN ORGANIZED HEALTH CARE EDUCATION/TRAINING PROGRAM

## 2021-07-21 RX ORDER — GLIMEPIRIDE 1 MG/1
2 TABLET ORAL
Status: DISCONTINUED | OUTPATIENT
Start: 2021-07-22 | End: 2021-07-29 | Stop reason: HOSPADM

## 2021-07-21 RX ORDER — METHOCARBAMOL 500 MG/1
500 TABLET, FILM COATED ORAL 4 TIMES DAILY
Status: DISCONTINUED | OUTPATIENT
Start: 2021-07-21 | End: 2021-07-29 | Stop reason: HOSPADM

## 2021-07-21 RX ORDER — BISACODYL 5 MG
10 TABLET, DELAYED RELEASE (ENTERIC COATED) ORAL DAILY
Status: DISCONTINUED | OUTPATIENT
Start: 2021-07-22 | End: 2021-07-29 | Stop reason: HOSPADM

## 2021-07-21 RX ADMIN — HYDROCHLOROTHIAZIDE 25 MG: 12.5 TABLET ORAL at 08:07

## 2021-07-21 RX ADMIN — MICONAZOLE NITRATE: 20 POWDER TOPICAL at 08:07

## 2021-07-21 RX ADMIN — INSULIN ASPART 4 UNITS: 100 INJECTION, SOLUTION INTRAVENOUS; SUBCUTANEOUS at 08:07

## 2021-07-21 RX ADMIN — ACETAMINOPHEN 1000 MG: 500 TABLET, FILM COATED ORAL at 10:07

## 2021-07-21 RX ADMIN — ACETAMINOPHEN 1000 MG: 500 TABLET, FILM COATED ORAL at 02:07

## 2021-07-21 RX ADMIN — METFORMIN HYDROCHLORIDE 500 MG: 500 TABLET ORAL at 08:07

## 2021-07-21 RX ADMIN — ATORVASTATIN CALCIUM 20 MG: 20 TABLET, FILM COATED ORAL at 08:07

## 2021-07-21 RX ADMIN — AMLODIPINE BESYLATE 5 MG: 5 TABLET ORAL at 08:07

## 2021-07-21 RX ADMIN — ENOXAPARIN SODIUM 40 MG: 40 INJECTION SUBCUTANEOUS at 05:07

## 2021-07-21 RX ADMIN — INSULIN ASPART 6 UNITS: 100 INJECTION, SOLUTION INTRAVENOUS; SUBCUTANEOUS at 05:07

## 2021-07-21 RX ADMIN — LISINOPRIL 5 MG: 2.5 TABLET ORAL at 08:07

## 2021-07-21 RX ADMIN — METFORMIN HYDROCHLORIDE 500 MG: 500 TABLET ORAL at 05:07

## 2021-07-21 RX ADMIN — MELATONIN TAB 3 MG 9 MG: 3 TAB at 08:07

## 2021-07-21 RX ADMIN — INSULIN ASPART 4 UNITS: 100 INJECTION, SOLUTION INTRAVENOUS; SUBCUTANEOUS at 12:07

## 2021-07-21 RX ADMIN — TRAMADOL HYDROCHLORIDE 50 MG: 50 TABLET, COATED ORAL at 11:07

## 2021-07-21 RX ADMIN — ONDANSETRON 4 MG: 4 TABLET, ORALLY DISINTEGRATING ORAL at 11:07

## 2021-07-21 RX ADMIN — ACETAMINOPHEN 650 MG: 325 TABLET ORAL at 08:07

## 2021-07-21 RX ADMIN — PANTOPRAZOLE SODIUM 40 MG: 40 TABLET, DELAYED RELEASE ORAL at 08:07

## 2021-07-21 RX ADMIN — METHOCARBAMOL 500 MG: 500 TABLET ORAL at 08:07

## 2021-07-21 RX ADMIN — METHOCARBAMOL 500 MG: 500 TABLET ORAL at 05:07

## 2021-07-22 LAB
ANION GAP SERPL CALC-SCNC: 7 MMOL/L (ref 8–16)
BUN SERPL-MCNC: 23 MG/DL (ref 8–23)
CALCIUM SERPL-MCNC: 9.3 MG/DL (ref 8.7–10.5)
CHLORIDE SERPL-SCNC: 103 MMOL/L (ref 95–110)
CO2 SERPL-SCNC: 27 MMOL/L (ref 23–29)
CREAT SERPL-MCNC: 1 MG/DL (ref 0.5–1.4)
ERYTHROCYTE [DISTWIDTH] IN BLOOD BY AUTOMATED COUNT: 13.7 % (ref 11.5–14.5)
EST. GFR  (AFRICAN AMERICAN): >60 ML/MIN/1.73 M^2
EST. GFR  (NON AFRICAN AMERICAN): 56.8 ML/MIN/1.73 M^2
GLUCOSE SERPL-MCNC: 197 MG/DL (ref 70–110)
HCT VFR BLD AUTO: 30.1 % (ref 37–48.5)
HGB BLD-MCNC: 9.7 G/DL (ref 12–16)
MAGNESIUM SERPL-MCNC: 1.4 MG/DL (ref 1.6–2.6)
MCH RBC QN AUTO: 28.7 PG (ref 27–31)
MCHC RBC AUTO-ENTMCNC: 32.2 G/DL (ref 32–36)
MCV RBC AUTO: 89 FL (ref 82–98)
PHOSPHATE SERPL-MCNC: 3.6 MG/DL (ref 2.7–4.5)
PLATELET # BLD AUTO: 333 K/UL (ref 150–450)
PMV BLD AUTO: 10.4 FL (ref 9.2–12.9)
POCT GLUCOSE: 178 MG/DL (ref 70–110)
POCT GLUCOSE: 205 MG/DL (ref 70–110)
POCT GLUCOSE: 206 MG/DL (ref 70–110)
POCT GLUCOSE: 263 MG/DL (ref 70–110)
POTASSIUM SERPL-SCNC: 3.9 MMOL/L (ref 3.5–5.1)
RBC # BLD AUTO: 3.38 M/UL (ref 4–5.4)
SODIUM SERPL-SCNC: 137 MMOL/L (ref 136–145)
WBC # BLD AUTO: 6.73 K/UL (ref 3.9–12.7)

## 2021-07-22 PROCEDURE — 25000003 PHARM REV CODE 250: Performed by: STUDENT IN AN ORGANIZED HEALTH CARE EDUCATION/TRAINING PROGRAM

## 2021-07-22 PROCEDURE — 83735 ASSAY OF MAGNESIUM: CPT | Performed by: HOSPITALIST

## 2021-07-22 PROCEDURE — 36415 COLL VENOUS BLD VENIPUNCTURE: CPT | Performed by: HOSPITALIST

## 2021-07-22 PROCEDURE — 85027 COMPLETE CBC AUTOMATED: CPT | Performed by: HOSPITALIST

## 2021-07-22 PROCEDURE — 80048 BASIC METABOLIC PNL TOTAL CA: CPT | Performed by: HOSPITALIST

## 2021-07-22 PROCEDURE — 25000003 PHARM REV CODE 250: Performed by: HOSPITALIST

## 2021-07-22 PROCEDURE — 11000004 HC SNF PRIVATE

## 2021-07-22 PROCEDURE — 63600175 PHARM REV CODE 636 W HCPCS: Performed by: STUDENT IN AN ORGANIZED HEALTH CARE EDUCATION/TRAINING PROGRAM

## 2021-07-22 PROCEDURE — 84100 ASSAY OF PHOSPHORUS: CPT | Performed by: HOSPITALIST

## 2021-07-22 PROCEDURE — 97110 THERAPEUTIC EXERCISES: CPT | Mod: CQ

## 2021-07-22 PROCEDURE — 97116 GAIT TRAINING THERAPY: CPT | Mod: CQ

## 2021-07-22 PROCEDURE — 97530 THERAPEUTIC ACTIVITIES: CPT | Mod: CQ

## 2021-07-22 PROCEDURE — 25000003 PHARM REV CODE 250: Performed by: NURSE PRACTITIONER

## 2021-07-22 PROCEDURE — 97110 THERAPEUTIC EXERCISES: CPT | Mod: CO

## 2021-07-22 RX ORDER — LANOLIN ALCOHOL/MO/W.PET/CERES
400 CREAM (GRAM) TOPICAL 2 TIMES DAILY
Status: COMPLETED | OUTPATIENT
Start: 2021-07-22 | End: 2021-07-24

## 2021-07-22 RX ADMIN — ACETAMINOPHEN 1000 MG: 500 TABLET, FILM COATED ORAL at 12:07

## 2021-07-22 RX ADMIN — MELATONIN TAB 3 MG 9 MG: 3 TAB at 09:07

## 2021-07-22 RX ADMIN — ATORVASTATIN CALCIUM 20 MG: 20 TABLET, FILM COATED ORAL at 09:07

## 2021-07-22 RX ADMIN — ACETAMINOPHEN 1000 MG: 500 TABLET, FILM COATED ORAL at 05:07

## 2021-07-22 RX ADMIN — INSULIN ASPART 2 UNITS: 100 INJECTION, SOLUTION INTRAVENOUS; SUBCUTANEOUS at 05:07

## 2021-07-22 RX ADMIN — METHOCARBAMOL 500 MG: 500 TABLET ORAL at 08:07

## 2021-07-22 RX ADMIN — ENOXAPARIN SODIUM 40 MG: 40 INJECTION SUBCUTANEOUS at 05:07

## 2021-07-22 RX ADMIN — AMLODIPINE BESYLATE 5 MG: 5 TABLET ORAL at 09:07

## 2021-07-22 RX ADMIN — METFORMIN HYDROCHLORIDE 500 MG: 500 TABLET ORAL at 05:07

## 2021-07-22 RX ADMIN — METFORMIN HYDROCHLORIDE 500 MG: 500 TABLET ORAL at 08:07

## 2021-07-22 RX ADMIN — ACETAMINOPHEN 1000 MG: 500 TABLET, FILM COATED ORAL at 10:07

## 2021-07-22 RX ADMIN — LISINOPRIL 5 MG: 2.5 TABLET ORAL at 08:07

## 2021-07-22 RX ADMIN — MAGNESIUM OXIDE TAB 400 MG (241.3 MG ELEMENTAL MG) 400 MG: 400 (241.3 MG) TAB at 09:07

## 2021-07-22 RX ADMIN — PANTOPRAZOLE SODIUM 40 MG: 40 TABLET, DELAYED RELEASE ORAL at 08:07

## 2021-07-22 RX ADMIN — HYDROCHLOROTHIAZIDE 25 MG: 12.5 TABLET ORAL at 08:07

## 2021-07-22 RX ADMIN — MICONAZOLE NITRATE: 20 POWDER TOPICAL at 09:07

## 2021-07-22 RX ADMIN — METHOCARBAMOL 500 MG: 500 TABLET ORAL at 09:07

## 2021-07-22 RX ADMIN — INSULIN ASPART 2 UNITS: 100 INJECTION, SOLUTION INTRAVENOUS; SUBCUTANEOUS at 10:07

## 2021-07-22 RX ADMIN — BISACODYL 10 MG: 5 TABLET, COATED ORAL at 08:07

## 2021-07-22 RX ADMIN — INSULIN ASPART 4 UNITS: 100 INJECTION, SOLUTION INTRAVENOUS; SUBCUTANEOUS at 08:07

## 2021-07-22 RX ADMIN — INSULIN ASPART 6 UNITS: 100 INJECTION, SOLUTION INTRAVENOUS; SUBCUTANEOUS at 12:07

## 2021-07-22 RX ADMIN — GLIMEPIRIDE 2 MG: 1 TABLET ORAL at 08:07

## 2021-07-22 RX ADMIN — METHOCARBAMOL 500 MG: 500 TABLET ORAL at 05:07

## 2021-07-22 RX ADMIN — METHOCARBAMOL 500 MG: 500 TABLET ORAL at 12:07

## 2021-07-22 RX ADMIN — MICONAZOLE NITRATE: 20 POWDER TOPICAL at 08:07

## 2021-07-23 LAB
BASOPHILS # BLD AUTO: 0.07 K/UL (ref 0–0.2)
BASOPHILS NFR BLD: 1 % (ref 0–1.9)
DIFFERENTIAL METHOD: ABNORMAL
EOSINOPHIL # BLD AUTO: 0.5 K/UL (ref 0–0.5)
EOSINOPHIL NFR BLD: 7.2 % (ref 0–8)
ERYTHROCYTE [DISTWIDTH] IN BLOOD BY AUTOMATED COUNT: 13.6 % (ref 11.5–14.5)
HCT VFR BLD AUTO: 29.7 % (ref 37–48.5)
HGB BLD-MCNC: 9.8 G/DL (ref 12–16)
IMM GRANULOCYTES # BLD AUTO: 0.03 K/UL (ref 0–0.04)
IMM GRANULOCYTES NFR BLD AUTO: 0.4 % (ref 0–0.5)
LYMPHOCYTES # BLD AUTO: 2.6 K/UL (ref 1–4.8)
LYMPHOCYTES NFR BLD: 37.7 % (ref 18–48)
MCH RBC QN AUTO: 29.4 PG (ref 27–31)
MCHC RBC AUTO-ENTMCNC: 33 G/DL (ref 32–36)
MCV RBC AUTO: 89 FL (ref 82–98)
MONOCYTES # BLD AUTO: 0.5 K/UL (ref 0.3–1)
MONOCYTES NFR BLD: 6.8 % (ref 4–15)
NEUTROPHILS # BLD AUTO: 3.2 K/UL (ref 1.8–7.7)
NEUTROPHILS NFR BLD: 46.9 % (ref 38–73)
NRBC BLD-RTO: 0 /100 WBC
PLATELET # BLD AUTO: 325 K/UL (ref 150–450)
PMV BLD AUTO: 10.2 FL (ref 9.2–12.9)
POCT GLUCOSE: 152 MG/DL (ref 70–110)
POCT GLUCOSE: 186 MG/DL (ref 70–110)
POCT GLUCOSE: 202 MG/DL (ref 70–110)
POCT GLUCOSE: 344 MG/DL (ref 70–110)
RBC # BLD AUTO: 3.33 M/UL (ref 4–5.4)
WBC # BLD AUTO: 6.77 K/UL (ref 3.9–12.7)

## 2021-07-23 PROCEDURE — 25000003 PHARM REV CODE 250: Performed by: NURSE PRACTITIONER

## 2021-07-23 PROCEDURE — 63600175 PHARM REV CODE 636 W HCPCS: Performed by: STUDENT IN AN ORGANIZED HEALTH CARE EDUCATION/TRAINING PROGRAM

## 2021-07-23 PROCEDURE — 97530 THERAPEUTIC ACTIVITIES: CPT

## 2021-07-23 PROCEDURE — 97535 SELF CARE MNGMENT TRAINING: CPT

## 2021-07-23 PROCEDURE — 36415 COLL VENOUS BLD VENIPUNCTURE: CPT | Performed by: NURSE PRACTITIONER

## 2021-07-23 PROCEDURE — 11000004 HC SNF PRIVATE

## 2021-07-23 PROCEDURE — 97530 THERAPEUTIC ACTIVITIES: CPT | Mod: CQ

## 2021-07-23 PROCEDURE — 25000003 PHARM REV CODE 250: Performed by: STUDENT IN AN ORGANIZED HEALTH CARE EDUCATION/TRAINING PROGRAM

## 2021-07-23 PROCEDURE — 97110 THERAPEUTIC EXERCISES: CPT

## 2021-07-23 PROCEDURE — 25000003 PHARM REV CODE 250: Performed by: HOSPITALIST

## 2021-07-23 PROCEDURE — 85025 COMPLETE CBC W/AUTO DIFF WBC: CPT | Performed by: NURSE PRACTITIONER

## 2021-07-23 PROCEDURE — 97116 GAIT TRAINING THERAPY: CPT | Mod: CQ

## 2021-07-23 RX ADMIN — ACETAMINOPHEN 1000 MG: 500 TABLET, FILM COATED ORAL at 06:07

## 2021-07-23 RX ADMIN — ENOXAPARIN SODIUM 40 MG: 40 INJECTION SUBCUTANEOUS at 05:07

## 2021-07-23 RX ADMIN — MAGNESIUM OXIDE TAB 400 MG (241.3 MG ELEMENTAL MG) 400 MG: 400 (241.3 MG) TAB at 09:07

## 2021-07-23 RX ADMIN — GLIMEPIRIDE 2 MG: 1 TABLET ORAL at 09:07

## 2021-07-23 RX ADMIN — ACETAMINOPHEN 1000 MG: 500 TABLET, FILM COATED ORAL at 10:07

## 2021-07-23 RX ADMIN — MAGNESIUM OXIDE TAB 400 MG (241.3 MG ELEMENTAL MG) 400 MG: 400 (241.3 MG) TAB at 08:07

## 2021-07-23 RX ADMIN — PANTOPRAZOLE SODIUM 40 MG: 40 TABLET, DELAYED RELEASE ORAL at 09:07

## 2021-07-23 RX ADMIN — MELATONIN TAB 3 MG 9 MG: 3 TAB at 08:07

## 2021-07-23 RX ADMIN — TRAMADOL HYDROCHLORIDE 50 MG: 50 TABLET, COATED ORAL at 05:07

## 2021-07-23 RX ADMIN — TRAMADOL HYDROCHLORIDE 50 MG: 50 TABLET, COATED ORAL at 09:07

## 2021-07-23 RX ADMIN — METHOCARBAMOL 500 MG: 500 TABLET ORAL at 05:07

## 2021-07-23 RX ADMIN — MICONAZOLE NITRATE: 20 POWDER TOPICAL at 09:07

## 2021-07-23 RX ADMIN — INSULIN ASPART 8 UNITS: 100 INJECTION, SOLUTION INTRAVENOUS; SUBCUTANEOUS at 12:07

## 2021-07-23 RX ADMIN — INSULIN ASPART 2 UNITS: 100 INJECTION, SOLUTION INTRAVENOUS; SUBCUTANEOUS at 10:07

## 2021-07-23 RX ADMIN — METHOCARBAMOL 500 MG: 500 TABLET ORAL at 12:07

## 2021-07-23 RX ADMIN — METHOCARBAMOL 500 MG: 500 TABLET ORAL at 09:07

## 2021-07-23 RX ADMIN — METFORMIN HYDROCHLORIDE 500 MG: 500 TABLET ORAL at 05:07

## 2021-07-23 RX ADMIN — ATORVASTATIN CALCIUM 20 MG: 20 TABLET, FILM COATED ORAL at 08:07

## 2021-07-23 RX ADMIN — METHOCARBAMOL 500 MG: 500 TABLET ORAL at 08:07

## 2021-07-23 RX ADMIN — ACETAMINOPHEN 650 MG: 325 TABLET ORAL at 12:07

## 2021-07-23 RX ADMIN — BISACODYL 10 MG: 5 TABLET, COATED ORAL at 09:07

## 2021-07-23 RX ADMIN — MICONAZOLE NITRATE: 20 POWDER TOPICAL at 10:07

## 2021-07-23 RX ADMIN — METFORMIN HYDROCHLORIDE 500 MG: 500 TABLET ORAL at 09:07

## 2021-07-24 LAB
POCT GLUCOSE: 140 MG/DL (ref 70–110)
POCT GLUCOSE: 216 MG/DL (ref 70–110)
POCT GLUCOSE: 238 MG/DL (ref 70–110)
POCT GLUCOSE: 239 MG/DL (ref 70–110)

## 2021-07-24 PROCEDURE — 97116 GAIT TRAINING THERAPY: CPT

## 2021-07-24 PROCEDURE — 25000003 PHARM REV CODE 250: Performed by: HOSPITALIST

## 2021-07-24 PROCEDURE — 25000003 PHARM REV CODE 250: Performed by: NURSE PRACTITIONER

## 2021-07-24 PROCEDURE — 97110 THERAPEUTIC EXERCISES: CPT

## 2021-07-24 PROCEDURE — 11000004 HC SNF PRIVATE

## 2021-07-24 PROCEDURE — 25000003 PHARM REV CODE 250: Performed by: STUDENT IN AN ORGANIZED HEALTH CARE EDUCATION/TRAINING PROGRAM

## 2021-07-24 PROCEDURE — 63600175 PHARM REV CODE 636 W HCPCS: Performed by: STUDENT IN AN ORGANIZED HEALTH CARE EDUCATION/TRAINING PROGRAM

## 2021-07-24 RX ADMIN — PANTOPRAZOLE SODIUM 40 MG: 40 TABLET, DELAYED RELEASE ORAL at 08:07

## 2021-07-24 RX ADMIN — BISACODYL 10 MG: 5 TABLET, COATED ORAL at 08:07

## 2021-07-24 RX ADMIN — METHOCARBAMOL 500 MG: 500 TABLET ORAL at 05:07

## 2021-07-24 RX ADMIN — HYDROCHLOROTHIAZIDE 25 MG: 12.5 TABLET ORAL at 08:07

## 2021-07-24 RX ADMIN — METFORMIN HYDROCHLORIDE 500 MG: 500 TABLET ORAL at 08:07

## 2021-07-24 RX ADMIN — ACETAMINOPHEN 1000 MG: 500 TABLET, FILM COATED ORAL at 05:07

## 2021-07-24 RX ADMIN — MICONAZOLE NITRATE: 20 POWDER TOPICAL at 09:07

## 2021-07-24 RX ADMIN — MELATONIN TAB 3 MG 9 MG: 3 TAB at 08:07

## 2021-07-24 RX ADMIN — MICONAZOLE NITRATE: 20 POWDER TOPICAL at 10:07

## 2021-07-24 RX ADMIN — METHOCARBAMOL 500 MG: 500 TABLET ORAL at 08:07

## 2021-07-24 RX ADMIN — INSULIN ASPART 4 UNITS: 100 INJECTION, SOLUTION INTRAVENOUS; SUBCUTANEOUS at 05:07

## 2021-07-24 RX ADMIN — ACETAMINOPHEN 1000 MG: 500 TABLET, FILM COATED ORAL at 02:07

## 2021-07-24 RX ADMIN — MAGNESIUM OXIDE TAB 400 MG (241.3 MG ELEMENTAL MG) 400 MG: 400 (241.3 MG) TAB at 08:07

## 2021-07-24 RX ADMIN — LISINOPRIL 5 MG: 2.5 TABLET ORAL at 08:07

## 2021-07-24 RX ADMIN — INSULIN ASPART 2 UNITS: 100 INJECTION, SOLUTION INTRAVENOUS; SUBCUTANEOUS at 10:07

## 2021-07-24 RX ADMIN — ATORVASTATIN CALCIUM 20 MG: 20 TABLET, FILM COATED ORAL at 08:07

## 2021-07-24 RX ADMIN — METHOCARBAMOL 500 MG: 500 TABLET ORAL at 12:07

## 2021-07-24 RX ADMIN — INSULIN ASPART 4 UNITS: 100 INJECTION, SOLUTION INTRAVENOUS; SUBCUTANEOUS at 12:07

## 2021-07-24 RX ADMIN — ENOXAPARIN SODIUM 40 MG: 40 INJECTION SUBCUTANEOUS at 05:07

## 2021-07-24 RX ADMIN — ACETAMINOPHEN 1000 MG: 500 TABLET, FILM COATED ORAL at 10:07

## 2021-07-24 RX ADMIN — METFORMIN HYDROCHLORIDE 500 MG: 500 TABLET ORAL at 05:07

## 2021-07-25 LAB
POCT GLUCOSE: 178 MG/DL (ref 70–110)
POCT GLUCOSE: 182 MG/DL (ref 70–110)
POCT GLUCOSE: 194 MG/DL (ref 70–110)
POCT GLUCOSE: 277 MG/DL (ref 70–110)

## 2021-07-25 PROCEDURE — 63600175 PHARM REV CODE 636 W HCPCS: Performed by: STUDENT IN AN ORGANIZED HEALTH CARE EDUCATION/TRAINING PROGRAM

## 2021-07-25 PROCEDURE — 25000003 PHARM REV CODE 250: Performed by: HOSPITALIST

## 2021-07-25 PROCEDURE — 25000003 PHARM REV CODE 250: Performed by: NURSE PRACTITIONER

## 2021-07-25 PROCEDURE — 97535 SELF CARE MNGMENT TRAINING: CPT

## 2021-07-25 PROCEDURE — 25000003 PHARM REV CODE 250: Performed by: STUDENT IN AN ORGANIZED HEALTH CARE EDUCATION/TRAINING PROGRAM

## 2021-07-25 PROCEDURE — 11000004 HC SNF PRIVATE

## 2021-07-25 RX ADMIN — METFORMIN HYDROCHLORIDE 500 MG: 500 TABLET ORAL at 08:07

## 2021-07-25 RX ADMIN — PANTOPRAZOLE SODIUM 40 MG: 40 TABLET, DELAYED RELEASE ORAL at 08:07

## 2021-07-25 RX ADMIN — METHOCARBAMOL 500 MG: 500 TABLET ORAL at 05:07

## 2021-07-25 RX ADMIN — LISINOPRIL 5 MG: 2.5 TABLET ORAL at 08:07

## 2021-07-25 RX ADMIN — METHOCARBAMOL 500 MG: 500 TABLET ORAL at 08:07

## 2021-07-25 RX ADMIN — METFORMIN HYDROCHLORIDE 500 MG: 500 TABLET ORAL at 05:07

## 2021-07-25 RX ADMIN — ACETAMINOPHEN 1000 MG: 500 TABLET, FILM COATED ORAL at 05:07

## 2021-07-25 RX ADMIN — ENOXAPARIN SODIUM 40 MG: 40 INJECTION SUBCUTANEOUS at 05:07

## 2021-07-25 RX ADMIN — BISACODYL 10 MG: 5 TABLET, COATED ORAL at 08:07

## 2021-07-25 RX ADMIN — METHOCARBAMOL 500 MG: 500 TABLET ORAL at 01:07

## 2021-07-25 RX ADMIN — ATORVASTATIN CALCIUM 20 MG: 20 TABLET, FILM COATED ORAL at 08:07

## 2021-07-25 RX ADMIN — GLIMEPIRIDE 2 MG: 1 TABLET ORAL at 08:07

## 2021-07-25 RX ADMIN — ACETAMINOPHEN 1000 MG: 500 TABLET, FILM COATED ORAL at 10:07

## 2021-07-25 RX ADMIN — MELATONIN TAB 3 MG 9 MG: 3 TAB at 08:07

## 2021-07-25 RX ADMIN — INSULIN ASPART 2 UNITS: 100 INJECTION, SOLUTION INTRAVENOUS; SUBCUTANEOUS at 05:07

## 2021-07-25 RX ADMIN — ACETAMINOPHEN 1000 MG: 500 TABLET, FILM COATED ORAL at 01:07

## 2021-07-25 RX ADMIN — MICONAZOLE NITRATE: 20 POWDER TOPICAL at 08:07

## 2021-07-25 RX ADMIN — TRAMADOL HYDROCHLORIDE 50 MG: 50 TABLET, COATED ORAL at 08:07

## 2021-07-25 RX ADMIN — INSULIN ASPART 6 UNITS: 100 INJECTION, SOLUTION INTRAVENOUS; SUBCUTANEOUS at 12:07

## 2021-07-26 LAB
ANION GAP SERPL CALC-SCNC: 9 MMOL/L (ref 8–16)
BUN SERPL-MCNC: 15 MG/DL (ref 8–23)
CALCIUM SERPL-MCNC: 9.6 MG/DL (ref 8.7–10.5)
CHLORIDE SERPL-SCNC: 108 MMOL/L (ref 95–110)
CO2 SERPL-SCNC: 24 MMOL/L (ref 23–29)
CREAT SERPL-MCNC: 0.8 MG/DL (ref 0.5–1.4)
ERYTHROCYTE [DISTWIDTH] IN BLOOD BY AUTOMATED COUNT: 13.9 % (ref 11.5–14.5)
EST. GFR  (AFRICAN AMERICAN): >60 ML/MIN/1.73 M^2
EST. GFR  (NON AFRICAN AMERICAN): >60 ML/MIN/1.73 M^2
GLUCOSE SERPL-MCNC: 129 MG/DL (ref 70–110)
HCT VFR BLD AUTO: 30.7 % (ref 37–48.5)
HGB BLD-MCNC: 9.8 G/DL (ref 12–16)
MAGNESIUM SERPL-MCNC: 1.4 MG/DL (ref 1.6–2.6)
MCH RBC QN AUTO: 28.9 PG (ref 27–31)
MCHC RBC AUTO-ENTMCNC: 31.9 G/DL (ref 32–36)
MCV RBC AUTO: 91 FL (ref 82–98)
PHOSPHATE SERPL-MCNC: 3.6 MG/DL (ref 2.7–4.5)
PLATELET # BLD AUTO: 337 K/UL (ref 150–450)
PMV BLD AUTO: 10.5 FL (ref 9.2–12.9)
POCT GLUCOSE: 151 MG/DL (ref 70–110)
POCT GLUCOSE: 166 MG/DL (ref 70–110)
POCT GLUCOSE: 185 MG/DL (ref 70–110)
POCT GLUCOSE: 251 MG/DL (ref 70–110)
POTASSIUM SERPL-SCNC: 4 MMOL/L (ref 3.5–5.1)
RBC # BLD AUTO: 3.39 M/UL (ref 4–5.4)
SODIUM SERPL-SCNC: 141 MMOL/L (ref 136–145)
WBC # BLD AUTO: 5.02 K/UL (ref 3.9–12.7)

## 2021-07-26 PROCEDURE — 25000003 PHARM REV CODE 250: Performed by: HOSPITALIST

## 2021-07-26 PROCEDURE — 80048 BASIC METABOLIC PNL TOTAL CA: CPT | Performed by: HOSPITALIST

## 2021-07-26 PROCEDURE — 25000003 PHARM REV CODE 250: Performed by: NURSE PRACTITIONER

## 2021-07-26 PROCEDURE — 97110 THERAPEUTIC EXERCISES: CPT | Mod: CQ

## 2021-07-26 PROCEDURE — 11000004 HC SNF PRIVATE

## 2021-07-26 PROCEDURE — 84100 ASSAY OF PHOSPHORUS: CPT | Performed by: HOSPITALIST

## 2021-07-26 PROCEDURE — 25000003 PHARM REV CODE 250: Performed by: STUDENT IN AN ORGANIZED HEALTH CARE EDUCATION/TRAINING PROGRAM

## 2021-07-26 PROCEDURE — 36415 COLL VENOUS BLD VENIPUNCTURE: CPT | Performed by: HOSPITALIST

## 2021-07-26 PROCEDURE — 83735 ASSAY OF MAGNESIUM: CPT | Performed by: HOSPITALIST

## 2021-07-26 PROCEDURE — 63600175 PHARM REV CODE 636 W HCPCS: Performed by: STUDENT IN AN ORGANIZED HEALTH CARE EDUCATION/TRAINING PROGRAM

## 2021-07-26 PROCEDURE — 97535 SELF CARE MNGMENT TRAINING: CPT | Mod: CO

## 2021-07-26 PROCEDURE — 85027 COMPLETE CBC AUTOMATED: CPT | Performed by: HOSPITALIST

## 2021-07-26 PROCEDURE — 97116 GAIT TRAINING THERAPY: CPT | Mod: CQ

## 2021-07-26 RX ORDER — POLYETHYLENE GLYCOL 3350 17 G/17G
17 POWDER, FOR SOLUTION ORAL EVERY OTHER DAY
Status: DISCONTINUED | OUTPATIENT
Start: 2021-07-28 | End: 2021-07-29 | Stop reason: HOSPADM

## 2021-07-26 RX ORDER — LANOLIN ALCOHOL/MO/W.PET/CERES
400 CREAM (GRAM) TOPICAL 2 TIMES DAILY
Status: COMPLETED | OUTPATIENT
Start: 2021-07-26 | End: 2021-07-28

## 2021-07-26 RX ADMIN — MAGNESIUM OXIDE TAB 400 MG (241.3 MG ELEMENTAL MG) 400 MG: 400 (241.3 MG) TAB at 09:07

## 2021-07-26 RX ADMIN — TRAMADOL HYDROCHLORIDE 50 MG: 50 TABLET, COATED ORAL at 09:07

## 2021-07-26 RX ADMIN — METHOCARBAMOL 500 MG: 500 TABLET ORAL at 08:07

## 2021-07-26 RX ADMIN — LISINOPRIL 5 MG: 2.5 TABLET ORAL at 08:07

## 2021-07-26 RX ADMIN — METFORMIN HYDROCHLORIDE 500 MG: 500 TABLET ORAL at 05:07

## 2021-07-26 RX ADMIN — INSULIN ASPART 2 UNITS: 100 INJECTION, SOLUTION INTRAVENOUS; SUBCUTANEOUS at 08:07

## 2021-07-26 RX ADMIN — GLIMEPIRIDE 2 MG: 1 TABLET ORAL at 08:07

## 2021-07-26 RX ADMIN — PANTOPRAZOLE SODIUM 40 MG: 40 TABLET, DELAYED RELEASE ORAL at 08:07

## 2021-07-26 RX ADMIN — INSULIN ASPART 1 UNITS: 100 INJECTION, SOLUTION INTRAVENOUS; SUBCUTANEOUS at 09:07

## 2021-07-26 RX ADMIN — ENOXAPARIN SODIUM 40 MG: 40 INJECTION SUBCUTANEOUS at 06:07

## 2021-07-26 RX ADMIN — INSULIN ASPART 2 UNITS: 100 INJECTION, SOLUTION INTRAVENOUS; SUBCUTANEOUS at 06:07

## 2021-07-26 RX ADMIN — METHOCARBAMOL 500 MG: 500 TABLET ORAL at 12:07

## 2021-07-26 RX ADMIN — METHOCARBAMOL 500 MG: 500 TABLET ORAL at 05:07

## 2021-07-26 RX ADMIN — HYDROCHLOROTHIAZIDE 25 MG: 12.5 TABLET ORAL at 08:07

## 2021-07-26 RX ADMIN — METHOCARBAMOL 500 MG: 500 TABLET ORAL at 09:07

## 2021-07-26 RX ADMIN — MELATONIN TAB 3 MG 9 MG: 3 TAB at 09:07

## 2021-07-26 RX ADMIN — METFORMIN HYDROCHLORIDE 500 MG: 500 TABLET ORAL at 08:07

## 2021-07-26 RX ADMIN — MICONAZOLE NITRATE: 20 POWDER TOPICAL at 08:07

## 2021-07-26 RX ADMIN — ACETAMINOPHEN 1000 MG: 500 TABLET, FILM COATED ORAL at 09:07

## 2021-07-26 RX ADMIN — INSULIN ASPART 6 UNITS: 100 INJECTION, SOLUTION INTRAVENOUS; SUBCUTANEOUS at 12:07

## 2021-07-26 RX ADMIN — ONDANSETRON 4 MG: 4 TABLET, ORALLY DISINTEGRATING ORAL at 08:07

## 2021-07-26 RX ADMIN — ATORVASTATIN CALCIUM 20 MG: 20 TABLET, FILM COATED ORAL at 09:07

## 2021-07-26 RX ADMIN — ACETAMINOPHEN 1000 MG: 500 TABLET, FILM COATED ORAL at 01:07

## 2021-07-27 ENCOUNTER — OFFICE VISIT (OUTPATIENT)
Dept: ORTHOPEDICS | Facility: CLINIC | Age: 71
DRG: 560 | End: 2021-07-27
Attending: HOSPITALIST
Payer: MEDICARE

## 2021-07-27 ENCOUNTER — HOSPITAL ENCOUNTER (OUTPATIENT)
Dept: RADIOLOGY | Facility: HOSPITAL | Age: 71
Discharge: HOME | End: 2021-07-27
Payer: MEDICARE

## 2021-07-27 VITALS
BODY MASS INDEX: 28.32 KG/M2 | HEIGHT: 66 IN | SYSTOLIC BLOOD PRESSURE: 113 MMHG | DIASTOLIC BLOOD PRESSURE: 74 MMHG | HEART RATE: 79 BPM

## 2021-07-27 DIAGNOSIS — S72.001A CLOSED DISPLACED FRACTURE OF RIGHT FEMORAL NECK: Primary | ICD-10-CM

## 2021-07-27 DIAGNOSIS — S72.001A CLOSED DISPLACED FRACTURE OF RIGHT FEMORAL NECK: ICD-10-CM

## 2021-07-27 LAB
POCT GLUCOSE: 134 MG/DL (ref 70–110)
POCT GLUCOSE: 140 MG/DL (ref 70–110)
POCT GLUCOSE: 161 MG/DL (ref 70–110)
POCT GLUCOSE: 212 MG/DL (ref 70–110)
POCT GLUCOSE: 246 MG/DL (ref 70–110)

## 2021-07-27 PROCEDURE — 1125F AMNT PAIN NOTED PAIN PRSNT: CPT | Mod: CPTII,S$GLB,, | Performed by: ORTHOPAEDIC SURGERY

## 2021-07-27 PROCEDURE — 72190 X-RAY EXAM OF PELVIS: CPT | Mod: TC,FY

## 2021-07-27 PROCEDURE — 1125F PR PAIN SEVERITY QUANTIFIED, PAIN PRESENT: ICD-10-PCS | Mod: CPTII,S$GLB,, | Performed by: ORTHOPAEDIC SURGERY

## 2021-07-27 PROCEDURE — 3074F PR MOST RECENT SYSTOLIC BLOOD PRESSURE < 130 MM HG: ICD-10-PCS | Mod: CPTII,S$GLB,, | Performed by: ORTHOPAEDIC SURGERY

## 2021-07-27 PROCEDURE — 3078F DIAST BP <80 MM HG: CPT | Mod: CPTII,S$GLB,, | Performed by: ORTHOPAEDIC SURGERY

## 2021-07-27 PROCEDURE — 3074F SYST BP LT 130 MM HG: CPT | Mod: CPTII,S$GLB,, | Performed by: ORTHOPAEDIC SURGERY

## 2021-07-27 PROCEDURE — 97535 SELF CARE MNGMENT TRAINING: CPT

## 2021-07-27 PROCEDURE — 72190 X-RAY EXAM OF PELVIS: CPT | Mod: 26,,, | Performed by: RADIOLOGY

## 2021-07-27 PROCEDURE — 1101F PR PT FALLS ASSESS DOC 0-1 FALLS W/OUT INJ PAST YR: ICD-10-PCS | Mod: CPTII,S$GLB,, | Performed by: ORTHOPAEDIC SURGERY

## 2021-07-27 PROCEDURE — 99999 PR PBB SHADOW E&M-EST. PATIENT-LVL III: CPT | Mod: PBBFAC,,, | Performed by: ORTHOPAEDIC SURGERY

## 2021-07-27 PROCEDURE — 3288F FALL RISK ASSESSMENT DOCD: CPT | Mod: CPTII,S$GLB,, | Performed by: ORTHOPAEDIC SURGERY

## 2021-07-27 PROCEDURE — 25000003 PHARM REV CODE 250: Performed by: NURSE PRACTITIONER

## 2021-07-27 PROCEDURE — 72190 XR PELVIS COMPLETE MIN 3 VIEWS: ICD-10-PCS | Mod: 26,,, | Performed by: RADIOLOGY

## 2021-07-27 PROCEDURE — 99024 PR POST-OP FOLLOW-UP VISIT: ICD-10-PCS | Mod: S$GLB,,, | Performed by: ORTHOPAEDIC SURGERY

## 2021-07-27 PROCEDURE — 3046F HEMOGLOBIN A1C LEVEL >9.0%: CPT | Mod: CPTII,S$GLB,, | Performed by: ORTHOPAEDIC SURGERY

## 2021-07-27 PROCEDURE — 25000003 PHARM REV CODE 250: Performed by: STUDENT IN AN ORGANIZED HEALTH CARE EDUCATION/TRAINING PROGRAM

## 2021-07-27 PROCEDURE — 1159F PR MEDICATION LIST DOCUMENTED IN MEDICAL RECORD: ICD-10-PCS | Mod: CPTII,S$GLB,, | Performed by: ORTHOPAEDIC SURGERY

## 2021-07-27 PROCEDURE — 3078F PR MOST RECENT DIASTOLIC BLOOD PRESSURE < 80 MM HG: ICD-10-PCS | Mod: CPTII,S$GLB,, | Performed by: ORTHOPAEDIC SURGERY

## 2021-07-27 PROCEDURE — 11000004 HC SNF PRIVATE

## 2021-07-27 PROCEDURE — 99024 POSTOP FOLLOW-UP VISIT: CPT | Mod: S$GLB,,, | Performed by: ORTHOPAEDIC SURGERY

## 2021-07-27 PROCEDURE — 99999 PR PBB SHADOW E&M-EST. PATIENT-LVL III: ICD-10-PCS | Mod: PBBFAC,,, | Performed by: ORTHOPAEDIC SURGERY

## 2021-07-27 PROCEDURE — 63600175 PHARM REV CODE 636 W HCPCS: Performed by: STUDENT IN AN ORGANIZED HEALTH CARE EDUCATION/TRAINING PROGRAM

## 2021-07-27 PROCEDURE — 25000003 PHARM REV CODE 250: Performed by: HOSPITALIST

## 2021-07-27 PROCEDURE — 3046F PR MOST RECENT HEMOGLOBIN A1C LEVEL > 9.0%: ICD-10-PCS | Mod: CPTII,S$GLB,, | Performed by: ORTHOPAEDIC SURGERY

## 2021-07-27 PROCEDURE — 3008F BODY MASS INDEX DOCD: CPT | Mod: CPTII,S$GLB,, | Performed by: ORTHOPAEDIC SURGERY

## 2021-07-27 PROCEDURE — 1159F MED LIST DOCD IN RCRD: CPT | Mod: CPTII,S$GLB,, | Performed by: ORTHOPAEDIC SURGERY

## 2021-07-27 PROCEDURE — 3288F PR FALLS RISK ASSESSMENT DOCUMENTED: ICD-10-PCS | Mod: CPTII,S$GLB,, | Performed by: ORTHOPAEDIC SURGERY

## 2021-07-27 PROCEDURE — 3008F PR BODY MASS INDEX (BMI) DOCUMENTED: ICD-10-PCS | Mod: CPTII,S$GLB,, | Performed by: ORTHOPAEDIC SURGERY

## 2021-07-27 PROCEDURE — 1101F PT FALLS ASSESS-DOCD LE1/YR: CPT | Mod: CPTII,S$GLB,, | Performed by: ORTHOPAEDIC SURGERY

## 2021-07-27 RX ORDER — POLYETHYLENE GLYCOL 3350 17 G/17G
17 POWDER, FOR SOLUTION ORAL EVERY OTHER DAY
Refills: 0 | COMMUNITY
Start: 2021-07-28

## 2021-07-27 RX ORDER — TRAMADOL HYDROCHLORIDE 50 MG/1
50 TABLET ORAL EVERY 6 HOURS PRN
Qty: 30 TABLET | Refills: 0 | Status: SHIPPED | OUTPATIENT
Start: 2021-07-27

## 2021-07-27 RX ORDER — AMLODIPINE BESYLATE 5 MG/1
5 TABLET ORAL DAILY
Qty: 90 TABLET | Refills: 3
Start: 2021-07-27 | End: 2022-07-27

## 2021-07-27 RX ORDER — ACETAMINOPHEN 500 MG
1000 TABLET ORAL EVERY 8 HOURS PRN
Refills: 0 | COMMUNITY
Start: 2021-07-27

## 2021-07-27 RX ORDER — METHOCARBAMOL 500 MG/1
500 TABLET, FILM COATED ORAL 4 TIMES DAILY
Qty: 40 TABLET | Refills: 0 | Status: SHIPPED | OUTPATIENT
Start: 2021-07-27 | End: 2021-08-06

## 2021-07-27 RX ADMIN — ONDANSETRON 4 MG: 4 TABLET, ORALLY DISINTEGRATING ORAL at 09:07

## 2021-07-27 RX ADMIN — METFORMIN HYDROCHLORIDE 500 MG: 500 TABLET ORAL at 09:07

## 2021-07-27 RX ADMIN — METHOCARBAMOL 500 MG: 500 TABLET ORAL at 06:07

## 2021-07-27 RX ADMIN — METHOCARBAMOL 500 MG: 500 TABLET ORAL at 09:07

## 2021-07-27 RX ADMIN — MAGNESIUM OXIDE TAB 400 MG (241.3 MG ELEMENTAL MG) 400 MG: 400 (241.3 MG) TAB at 08:07

## 2021-07-27 RX ADMIN — ENOXAPARIN SODIUM 40 MG: 40 INJECTION SUBCUTANEOUS at 06:07

## 2021-07-27 RX ADMIN — LISINOPRIL 5 MG: 2.5 TABLET ORAL at 09:07

## 2021-07-27 RX ADMIN — METHOCARBAMOL 500 MG: 500 TABLET ORAL at 02:07

## 2021-07-27 RX ADMIN — TRAMADOL HYDROCHLORIDE 50 MG: 50 TABLET, COATED ORAL at 08:07

## 2021-07-27 RX ADMIN — GLIMEPIRIDE 2 MG: 1 TABLET ORAL at 09:07

## 2021-07-27 RX ADMIN — ACETAMINOPHEN 1000 MG: 500 TABLET, FILM COATED ORAL at 02:07

## 2021-07-27 RX ADMIN — HYDROCHLOROTHIAZIDE 25 MG: 12.5 TABLET ORAL at 09:07

## 2021-07-27 RX ADMIN — MICONAZOLE NITRATE: 20 POWDER TOPICAL at 09:07

## 2021-07-27 RX ADMIN — MAGNESIUM OXIDE TAB 400 MG (241.3 MG ELEMENTAL MG) 400 MG: 400 (241.3 MG) TAB at 09:07

## 2021-07-27 RX ADMIN — ATORVASTATIN CALCIUM 20 MG: 20 TABLET, FILM COATED ORAL at 08:07

## 2021-07-27 RX ADMIN — METFORMIN HYDROCHLORIDE 500 MG: 500 TABLET ORAL at 06:07

## 2021-07-27 RX ADMIN — INSULIN ASPART 2 UNITS: 100 INJECTION, SOLUTION INTRAVENOUS; SUBCUTANEOUS at 09:07

## 2021-07-27 RX ADMIN — ACETAMINOPHEN 1000 MG: 500 TABLET, FILM COATED ORAL at 05:07

## 2021-07-27 RX ADMIN — PANTOPRAZOLE SODIUM 40 MG: 40 TABLET, DELAYED RELEASE ORAL at 09:07

## 2021-07-27 RX ADMIN — ACETAMINOPHEN 1000 MG: 500 TABLET, FILM COATED ORAL at 09:07

## 2021-07-27 RX ADMIN — INSULIN ASPART 2 UNITS: 100 INJECTION, SOLUTION INTRAVENOUS; SUBCUTANEOUS at 10:07

## 2021-07-27 RX ADMIN — INSULIN ASPART 4 UNITS: 100 INJECTION, SOLUTION INTRAVENOUS; SUBCUTANEOUS at 06:07

## 2021-07-27 RX ADMIN — MELATONIN TAB 3 MG 9 MG: 3 TAB at 08:07

## 2021-07-28 LAB
POCT GLUCOSE: 180 MG/DL (ref 70–110)
POCT GLUCOSE: 198 MG/DL (ref 70–110)
POCT GLUCOSE: 268 MG/DL (ref 70–110)
POCT GLUCOSE: 272 MG/DL (ref 70–110)

## 2021-07-28 PROCEDURE — 97110 THERAPEUTIC EXERCISES: CPT

## 2021-07-28 PROCEDURE — 97116 GAIT TRAINING THERAPY: CPT

## 2021-07-28 PROCEDURE — 25000003 PHARM REV CODE 250: Performed by: HOSPITALIST

## 2021-07-28 PROCEDURE — 11000004 HC SNF PRIVATE

## 2021-07-28 PROCEDURE — 63600175 PHARM REV CODE 636 W HCPCS: Performed by: STUDENT IN AN ORGANIZED HEALTH CARE EDUCATION/TRAINING PROGRAM

## 2021-07-28 PROCEDURE — 97530 THERAPEUTIC ACTIVITIES: CPT | Mod: CO

## 2021-07-28 PROCEDURE — 25000003 PHARM REV CODE 250: Performed by: NURSE PRACTITIONER

## 2021-07-28 PROCEDURE — 25000003 PHARM REV CODE 250: Performed by: STUDENT IN AN ORGANIZED HEALTH CARE EDUCATION/TRAINING PROGRAM

## 2021-07-28 RX ADMIN — ACETAMINOPHEN 1000 MG: 500 TABLET, FILM COATED ORAL at 05:07

## 2021-07-28 RX ADMIN — METHOCARBAMOL 500 MG: 500 TABLET ORAL at 05:07

## 2021-07-28 RX ADMIN — METHOCARBAMOL 500 MG: 500 TABLET ORAL at 09:07

## 2021-07-28 RX ADMIN — ENOXAPARIN SODIUM 40 MG: 40 INJECTION SUBCUTANEOUS at 05:07

## 2021-07-28 RX ADMIN — MICONAZOLE NITRATE: 20 POWDER TOPICAL at 08:07

## 2021-07-28 RX ADMIN — PANTOPRAZOLE SODIUM 40 MG: 40 TABLET, DELAYED RELEASE ORAL at 09:07

## 2021-07-28 RX ADMIN — MICONAZOLE NITRATE: 20 POWDER TOPICAL at 09:07

## 2021-07-28 RX ADMIN — INSULIN ASPART 2 UNITS: 100 INJECTION, SOLUTION INTRAVENOUS; SUBCUTANEOUS at 05:07

## 2021-07-28 RX ADMIN — TRAMADOL HYDROCHLORIDE 50 MG: 50 TABLET, COATED ORAL at 08:07

## 2021-07-28 RX ADMIN — ACETAMINOPHEN 1000 MG: 500 TABLET, FILM COATED ORAL at 09:07

## 2021-07-28 RX ADMIN — METFORMIN HYDROCHLORIDE 500 MG: 500 TABLET ORAL at 09:07

## 2021-07-28 RX ADMIN — ACETAMINOPHEN 1000 MG: 500 TABLET, FILM COATED ORAL at 01:07

## 2021-07-28 RX ADMIN — MELATONIN TAB 3 MG 9 MG: 3 TAB at 08:07

## 2021-07-28 RX ADMIN — HYDROCHLOROTHIAZIDE 25 MG: 12.5 TABLET ORAL at 09:07

## 2021-07-28 RX ADMIN — INSULIN ASPART 6 UNITS: 100 INJECTION, SOLUTION INTRAVENOUS; SUBCUTANEOUS at 12:07

## 2021-07-28 RX ADMIN — METFORMIN HYDROCHLORIDE 500 MG: 500 TABLET ORAL at 05:07

## 2021-07-28 RX ADMIN — MAGNESIUM OXIDE TAB 400 MG (241.3 MG ELEMENTAL MG) 400 MG: 400 (241.3 MG) TAB at 09:07

## 2021-07-28 RX ADMIN — METHOCARBAMOL 500 MG: 500 TABLET ORAL at 01:07

## 2021-07-28 RX ADMIN — GLIMEPIRIDE 2 MG: 1 TABLET ORAL at 09:07

## 2021-07-28 RX ADMIN — ATORVASTATIN CALCIUM 20 MG: 20 TABLET, FILM COATED ORAL at 08:07

## 2021-07-28 RX ADMIN — INSULIN ASPART 3 UNITS: 100 INJECTION, SOLUTION INTRAVENOUS; SUBCUTANEOUS at 08:07

## 2021-07-28 RX ADMIN — LISINOPRIL 5 MG: 2.5 TABLET ORAL at 09:07

## 2021-07-29 VITALS
RESPIRATION RATE: 18 BRPM | DIASTOLIC BLOOD PRESSURE: 75 MMHG | HEART RATE: 84 BPM | HEIGHT: 66 IN | TEMPERATURE: 99 F | BODY MASS INDEX: 28.21 KG/M2 | OXYGEN SATURATION: 96 % | WEIGHT: 175.5 LBS | SYSTOLIC BLOOD PRESSURE: 122 MMHG

## 2021-07-29 LAB — POCT GLUCOSE: 149 MG/DL (ref 70–110)

## 2021-07-29 PROCEDURE — 25000003 PHARM REV CODE 250: Performed by: NURSE PRACTITIONER

## 2021-07-29 PROCEDURE — 25000003 PHARM REV CODE 250: Performed by: STUDENT IN AN ORGANIZED HEALTH CARE EDUCATION/TRAINING PROGRAM

## 2021-07-29 RX ADMIN — PANTOPRAZOLE SODIUM 40 MG: 40 TABLET, DELAYED RELEASE ORAL at 08:07

## 2021-07-29 RX ADMIN — ACETAMINOPHEN 1000 MG: 500 TABLET, FILM COATED ORAL at 05:07

## 2021-07-29 RX ADMIN — LISINOPRIL 5 MG: 2.5 TABLET ORAL at 08:07

## 2021-07-29 RX ADMIN — METFORMIN HYDROCHLORIDE 500 MG: 500 TABLET ORAL at 08:07

## 2021-07-29 RX ADMIN — METHOCARBAMOL 500 MG: 500 TABLET ORAL at 08:07

## 2021-07-29 RX ADMIN — MICONAZOLE NITRATE: 20 POWDER TOPICAL at 08:07

## 2021-07-29 RX ADMIN — BISACODYL 10 MG: 5 TABLET, COATED ORAL at 08:07

## 2021-07-29 RX ADMIN — GLIMEPIRIDE 2 MG: 1 TABLET ORAL at 08:07

## 2021-07-29 RX ADMIN — HYDROCHLOROTHIAZIDE 25 MG: 12.5 TABLET ORAL at 08:07

## 2021-07-30 PROCEDURE — G0180 MD CERTIFICATION HHA PATIENT: HCPCS | Mod: ,,, | Performed by: HOSPITALIST

## 2021-07-30 PROCEDURE — G0180 PR HOME HEALTH MD CERTIFICATION: ICD-10-PCS | Mod: ,,, | Performed by: HOSPITALIST

## 2021-08-05 ENCOUNTER — TELEPHONE (OUTPATIENT)
Dept: FAMILY MEDICINE | Facility: CLINIC | Age: 71
End: 2021-08-05

## 2021-08-05 DIAGNOSIS — E11.65 TYPE 2 DIABETES MELLITUS WITH HYPERGLYCEMIA, WITHOUT LONG-TERM CURRENT USE OF INSULIN: ICD-10-CM

## 2021-08-05 RX ORDER — METFORMIN HYDROCHLORIDE 1000 MG/1
1000 TABLET ORAL 2 TIMES DAILY WITH MEALS
Start: 2021-08-05

## 2021-08-09 ENCOUNTER — TELEPHONE (OUTPATIENT)
Dept: FAMILY MEDICINE | Facility: CLINIC | Age: 71
End: 2021-08-09

## 2021-08-10 ENCOUNTER — OFFICE VISIT (OUTPATIENT)
Dept: INTERNAL MEDICINE | Facility: CLINIC | Age: 71
End: 2021-08-10
Payer: MEDICARE

## 2021-08-10 VITALS
SYSTOLIC BLOOD PRESSURE: 120 MMHG | WEIGHT: 165.38 LBS | BODY MASS INDEX: 25.96 KG/M2 | HEIGHT: 67 IN | HEART RATE: 97 BPM | OXYGEN SATURATION: 99 % | RESPIRATION RATE: 18 BRPM | DIASTOLIC BLOOD PRESSURE: 72 MMHG

## 2021-08-10 DIAGNOSIS — Z79.4 CONTROLLED TYPE 2 DIABETES MELLITUS WITH BOTH EYES AFFECTED BY MILD NONPROLIFERATIVE RETINOPATHY WITHOUT MACULAR EDEMA, WITH LONG-TERM CURRENT USE OF INSULIN: Primary | ICD-10-CM

## 2021-08-10 DIAGNOSIS — E11.3293 CONTROLLED TYPE 2 DIABETES MELLITUS WITH BOTH EYES AFFECTED BY MILD NONPROLIFERATIVE RETINOPATHY WITHOUT MACULAR EDEMA, WITH LONG-TERM CURRENT USE OF INSULIN: ICD-10-CM

## 2021-08-10 DIAGNOSIS — E11.3293 CONTROLLED TYPE 2 DIABETES MELLITUS WITH BOTH EYES AFFECTED BY MILD NONPROLIFERATIVE RETINOPATHY WITHOUT MACULAR EDEMA, WITH LONG-TERM CURRENT USE OF INSULIN: Primary | ICD-10-CM

## 2021-08-10 DIAGNOSIS — Z79.4 CONTROLLED TYPE 2 DIABETES MELLITUS WITH BOTH EYES AFFECTED BY MILD NONPROLIFERATIVE RETINOPATHY WITHOUT MACULAR EDEMA, WITH LONG-TERM CURRENT USE OF INSULIN: ICD-10-CM

## 2021-08-10 PROCEDURE — 1159F MED LIST DOCD IN RCRD: CPT | Mod: CPTII,S$GLB,, | Performed by: INTERNAL MEDICINE

## 2021-08-10 PROCEDURE — 99215 PR OFFICE/OUTPT VISIT, EST, LEVL V, 40-54 MIN: ICD-10-PCS | Mod: S$GLB,,, | Performed by: INTERNAL MEDICINE

## 2021-08-10 PROCEDURE — 3008F PR BODY MASS INDEX (BMI) DOCUMENTED: ICD-10-PCS | Mod: CPTII,S$GLB,, | Performed by: INTERNAL MEDICINE

## 2021-08-10 PROCEDURE — 3074F PR MOST RECENT SYSTOLIC BLOOD PRESSURE < 130 MM HG: ICD-10-PCS | Mod: CPTII,S$GLB,, | Performed by: INTERNAL MEDICINE

## 2021-08-10 PROCEDURE — 1160F RVW MEDS BY RX/DR IN RCRD: CPT | Mod: CPTII,S$GLB,, | Performed by: INTERNAL MEDICINE

## 2021-08-10 PROCEDURE — 99499 UNLISTED E&M SERVICE: CPT | Mod: S$GLB,,, | Performed by: INTERNAL MEDICINE

## 2021-08-10 PROCEDURE — 1159F PR MEDICATION LIST DOCUMENTED IN MEDICAL RECORD: ICD-10-PCS | Mod: CPTII,S$GLB,, | Performed by: INTERNAL MEDICINE

## 2021-08-10 PROCEDURE — 3078F PR MOST RECENT DIASTOLIC BLOOD PRESSURE < 80 MM HG: ICD-10-PCS | Mod: CPTII,S$GLB,, | Performed by: INTERNAL MEDICINE

## 2021-08-10 PROCEDURE — 1111F PR DISCHARGE MEDS RECONCILED W/ CURRENT OUTPATIENT MED LIST: ICD-10-PCS | Mod: CPTII,S$GLB,, | Performed by: INTERNAL MEDICINE

## 2021-08-10 PROCEDURE — 3288F PR FALLS RISK ASSESSMENT DOCUMENTED: ICD-10-PCS | Mod: CPTII,S$GLB,, | Performed by: INTERNAL MEDICINE

## 2021-08-10 PROCEDURE — 3046F PR MOST RECENT HEMOGLOBIN A1C LEVEL > 9.0%: ICD-10-PCS | Mod: CPTII,S$GLB,, | Performed by: INTERNAL MEDICINE

## 2021-08-10 PROCEDURE — 99999 PR PBB SHADOW E&M-EST. PATIENT-LVL IV: ICD-10-PCS | Mod: PBBFAC,,, | Performed by: INTERNAL MEDICINE

## 2021-08-10 PROCEDURE — 1160F PR REVIEW ALL MEDS BY PRESCRIBER/CLIN PHARMACIST DOCUMENTED: ICD-10-PCS | Mod: CPTII,S$GLB,, | Performed by: INTERNAL MEDICINE

## 2021-08-10 PROCEDURE — 3046F HEMOGLOBIN A1C LEVEL >9.0%: CPT | Mod: CPTII,S$GLB,, | Performed by: INTERNAL MEDICINE

## 2021-08-10 PROCEDURE — 3288F FALL RISK ASSESSMENT DOCD: CPT | Mod: CPTII,S$GLB,, | Performed by: INTERNAL MEDICINE

## 2021-08-10 PROCEDURE — 99499 RISK ADDL DX/OHS AUDIT: ICD-10-PCS | Mod: S$GLB,,, | Performed by: INTERNAL MEDICINE

## 2021-08-10 PROCEDURE — 99999 PR PBB SHADOW E&M-EST. PATIENT-LVL IV: CPT | Mod: PBBFAC,,, | Performed by: INTERNAL MEDICINE

## 2021-08-10 PROCEDURE — 3078F DIAST BP <80 MM HG: CPT | Mod: CPTII,S$GLB,, | Performed by: INTERNAL MEDICINE

## 2021-08-10 PROCEDURE — 99215 OFFICE O/P EST HI 40 MIN: CPT | Mod: S$GLB,,, | Performed by: INTERNAL MEDICINE

## 2021-08-10 PROCEDURE — 3074F SYST BP LT 130 MM HG: CPT | Mod: CPTII,S$GLB,, | Performed by: INTERNAL MEDICINE

## 2021-08-10 PROCEDURE — 1111F DSCHRG MED/CURRENT MED MERGE: CPT | Mod: CPTII,S$GLB,, | Performed by: INTERNAL MEDICINE

## 2021-08-10 PROCEDURE — 3008F BODY MASS INDEX DOCD: CPT | Mod: CPTII,S$GLB,, | Performed by: INTERNAL MEDICINE

## 2021-08-10 PROCEDURE — 1100F PR PT FALLS ASSESS DOC 2+ FALLS/FALL W/INJURY/YR: ICD-10-PCS | Mod: CPTII,S$GLB,, | Performed by: INTERNAL MEDICINE

## 2021-08-10 PROCEDURE — 1100F PTFALLS ASSESS-DOCD GE2>/YR: CPT | Mod: CPTII,S$GLB,, | Performed by: INTERNAL MEDICINE

## 2021-08-10 RX ORDER — GLIMEPIRIDE 4 MG/1
4 TABLET ORAL 2 TIMES DAILY
Qty: 60 TABLET | Refills: 0 | Status: SHIPPED | OUTPATIENT
Start: 2021-08-10 | End: 2021-08-10 | Stop reason: SDUPTHER

## 2021-08-10 RX ORDER — GLIMEPIRIDE 4 MG/1
4 TABLET ORAL
Qty: 60 TABLET | Refills: 0 | Status: SHIPPED | OUTPATIENT
Start: 2021-08-10 | End: 2021-08-11

## 2021-08-10 RX ORDER — GLIMEPIRIDE 4 MG/1
4 TABLET ORAL
Qty: 60 TABLET | Refills: 0 | Status: SHIPPED | OUTPATIENT
Start: 2021-08-10 | End: 2021-08-10 | Stop reason: SDUPTHER

## 2021-08-11 ENCOUNTER — DOCUMENT SCAN (OUTPATIENT)
Dept: HOME HEALTH SERVICES | Facility: HOSPITAL | Age: 71
End: 2021-08-11
Payer: MEDICARE

## 2021-08-11 RX ORDER — GLIMEPIRIDE 4 MG/1
TABLET ORAL
Qty: 90 TABLET | Refills: 3 | Status: SHIPPED | OUTPATIENT
Start: 2021-08-11

## 2021-08-16 ENCOUNTER — EXTERNAL HOME HEALTH (OUTPATIENT)
Dept: HOME HEALTH SERVICES | Facility: HOSPITAL | Age: 71
End: 2021-08-16
Payer: MEDICARE

## 2021-08-16 ENCOUNTER — TELEPHONE (OUTPATIENT)
Dept: FAMILY MEDICINE | Facility: CLINIC | Age: 71
End: 2021-08-16

## 2021-08-18 ENCOUNTER — DOCUMENT SCAN (OUTPATIENT)
Dept: HOME HEALTH SERVICES | Facility: HOSPITAL | Age: 71
End: 2021-08-18
Payer: MEDICARE

## 2021-08-25 DIAGNOSIS — S72.001A CLOSED DISPLACED FRACTURE OF RIGHT FEMORAL NECK: Primary | ICD-10-CM

## 2021-08-26 ENCOUNTER — OFFICE VISIT (OUTPATIENT)
Dept: ORTHOPEDICS | Facility: CLINIC | Age: 71
End: 2021-08-26
Payer: MEDICARE

## 2021-08-26 ENCOUNTER — HOSPITAL ENCOUNTER (OUTPATIENT)
Dept: RADIOLOGY | Facility: HOSPITAL | Age: 71
Discharge: HOME | End: 2021-08-26
Attending: ORTHOPAEDIC SURGERY
Payer: MEDICARE

## 2021-08-26 VITALS
BODY MASS INDEX: 26.6 KG/M2 | DIASTOLIC BLOOD PRESSURE: 75 MMHG | WEIGHT: 169.44 LBS | SYSTOLIC BLOOD PRESSURE: 129 MMHG | HEART RATE: 90 BPM | HEIGHT: 67 IN

## 2021-08-26 DIAGNOSIS — S72.001A CLOSED DISPLACED FRACTURE OF RIGHT FEMORAL NECK: ICD-10-CM

## 2021-08-26 DIAGNOSIS — S72.001A CLOSED DISPLACED FRACTURE OF RIGHT FEMORAL NECK: Primary | ICD-10-CM

## 2021-08-26 PROCEDURE — 3046F PR MOST RECENT HEMOGLOBIN A1C LEVEL > 9.0%: ICD-10-PCS | Mod: CPTII,S$GLB,, | Performed by: ORTHOPAEDIC SURGERY

## 2021-08-26 PROCEDURE — 99999 PR PBB SHADOW E&M-EST. PATIENT-LVL III: ICD-10-PCS | Mod: PBBFAC,,, | Performed by: ORTHOPAEDIC SURGERY

## 2021-08-26 PROCEDURE — 3074F SYST BP LT 130 MM HG: CPT | Mod: CPTII,S$GLB,, | Performed by: ORTHOPAEDIC SURGERY

## 2021-08-26 PROCEDURE — 3074F PR MOST RECENT SYSTOLIC BLOOD PRESSURE < 130 MM HG: ICD-10-PCS | Mod: CPTII,S$GLB,, | Performed by: ORTHOPAEDIC SURGERY

## 2021-08-26 PROCEDURE — 73552 XR FEMUR 2 VIEW RIGHT: ICD-10-PCS | Mod: 26,RT,, | Performed by: RADIOLOGY

## 2021-08-26 PROCEDURE — 73552 X-RAY EXAM OF FEMUR 2/>: CPT | Mod: 26,RT,, | Performed by: RADIOLOGY

## 2021-08-26 PROCEDURE — 1125F AMNT PAIN NOTED PAIN PRSNT: CPT | Mod: CPTII,S$GLB,, | Performed by: ORTHOPAEDIC SURGERY

## 2021-08-26 PROCEDURE — 1101F PR PT FALLS ASSESS DOC 0-1 FALLS W/OUT INJ PAST YR: ICD-10-PCS | Mod: CPTII,S$GLB,, | Performed by: ORTHOPAEDIC SURGERY

## 2021-08-26 PROCEDURE — 1125F PR PAIN SEVERITY QUANTIFIED, PAIN PRESENT: ICD-10-PCS | Mod: CPTII,S$GLB,, | Performed by: ORTHOPAEDIC SURGERY

## 2021-08-26 PROCEDURE — 99999 PR PBB SHADOW E&M-EST. PATIENT-LVL III: CPT | Mod: PBBFAC,,, | Performed by: ORTHOPAEDIC SURGERY

## 2021-08-26 PROCEDURE — 99024 POSTOP FOLLOW-UP VISIT: CPT | Mod: S$GLB,,, | Performed by: ORTHOPAEDIC SURGERY

## 2021-08-26 PROCEDURE — 3288F FALL RISK ASSESSMENT DOCD: CPT | Mod: CPTII,S$GLB,, | Performed by: ORTHOPAEDIC SURGERY

## 2021-08-26 PROCEDURE — 1159F MED LIST DOCD IN RCRD: CPT | Mod: CPTII,S$GLB,, | Performed by: ORTHOPAEDIC SURGERY

## 2021-08-26 PROCEDURE — 3288F PR FALLS RISK ASSESSMENT DOCUMENTED: ICD-10-PCS | Mod: CPTII,S$GLB,, | Performed by: ORTHOPAEDIC SURGERY

## 2021-08-26 PROCEDURE — 1159F PR MEDICATION LIST DOCUMENTED IN MEDICAL RECORD: ICD-10-PCS | Mod: CPTII,S$GLB,, | Performed by: ORTHOPAEDIC SURGERY

## 2021-08-26 PROCEDURE — 99024 PR POST-OP FOLLOW-UP VISIT: ICD-10-PCS | Mod: S$GLB,,, | Performed by: ORTHOPAEDIC SURGERY

## 2021-08-26 PROCEDURE — 1101F PT FALLS ASSESS-DOCD LE1/YR: CPT | Mod: CPTII,S$GLB,, | Performed by: ORTHOPAEDIC SURGERY

## 2021-08-26 PROCEDURE — 3008F PR BODY MASS INDEX (BMI) DOCUMENTED: ICD-10-PCS | Mod: CPTII,S$GLB,, | Performed by: ORTHOPAEDIC SURGERY

## 2021-08-26 PROCEDURE — 3078F DIAST BP <80 MM HG: CPT | Mod: CPTII,S$GLB,, | Performed by: ORTHOPAEDIC SURGERY

## 2021-08-26 PROCEDURE — 3046F HEMOGLOBIN A1C LEVEL >9.0%: CPT | Mod: CPTII,S$GLB,, | Performed by: ORTHOPAEDIC SURGERY

## 2021-08-26 PROCEDURE — 73552 X-RAY EXAM OF FEMUR 2/>: CPT | Mod: TC,FY,RT

## 2021-08-26 PROCEDURE — 3008F BODY MASS INDEX DOCD: CPT | Mod: CPTII,S$GLB,, | Performed by: ORTHOPAEDIC SURGERY

## 2021-08-26 PROCEDURE — 3078F PR MOST RECENT DIASTOLIC BLOOD PRESSURE < 80 MM HG: ICD-10-PCS | Mod: CPTII,S$GLB,, | Performed by: ORTHOPAEDIC SURGERY

## 2021-10-21 DIAGNOSIS — E11.9 TYPE 2 DIABETES MELLITUS WITHOUT COMPLICATION: ICD-10-CM

## 2021-11-19 ENCOUNTER — TELEPHONE (OUTPATIENT)
Dept: INTERNAL MEDICINE | Facility: CLINIC | Age: 71
End: 2021-11-19
Payer: MEDICARE

## 2021-12-16 ENCOUNTER — PATIENT MESSAGE (OUTPATIENT)
Dept: ADMINISTRATIVE | Facility: HOSPITAL | Age: 71
End: 2021-12-16
Payer: MEDICARE

## 2022-01-07 ENCOUNTER — PATIENT MESSAGE (OUTPATIENT)
Dept: ADMINISTRATIVE | Facility: HOSPITAL | Age: 72
End: 2022-01-07
Payer: MEDICARE

## 2022-04-08 ENCOUNTER — PATIENT MESSAGE (OUTPATIENT)
Dept: ADMINISTRATIVE | Facility: HOSPITAL | Age: 72
End: 2022-04-08
Payer: MEDICARE

## 2022-05-03 DIAGNOSIS — Z12.31 OTHER SCREENING MAMMOGRAM: ICD-10-CM

## 2022-06-07 ENCOUNTER — PATIENT OUTREACH (OUTPATIENT)
Dept: ADMINISTRATIVE | Facility: HOSPITAL | Age: 72
End: 2022-06-07
Payer: MEDICARE

## 2022-06-07 ENCOUNTER — PATIENT MESSAGE (OUTPATIENT)
Dept: ADMINISTRATIVE | Facility: HOSPITAL | Age: 72
End: 2022-06-07
Payer: MEDICARE

## 2022-06-07 NOTE — PROGRESS NOTES
Non-compliant GAP report chart review -  06/07/2022  Chart review completed for the following HM test if overdue  (Mammogram, Colonoscopy, Screening,  Diabetic lab testing, and/or Dilated EYE EXAM) , Care Everywhere and Media reports - updates requested and reviewed.   [facility] and [facility] reviewed.   Patient outreach regarding Health Maintenance- Spk w/ dtr Pat. Is [place] of [place] /[facility]  Dtr get updated labs and upload in portal     Health Maintenance Due   Topic Date Due    TETANUS VACCINE  Never done    Shingles Vaccine (1 of 2) Never done    Pneumococcal Vaccines (Age 65+) (2 - PCV) 01/27/2016    Mammogram  02/24/2018    DEXA Scan  06/29/2021    Hemoglobin A1c  10/09/2021    COVID-19 Vaccine (3 - Booster for Pfizer series) 11/18/2021    Diabetes Urine Screening  06/14/2022    Foot Exam  06/14/2022    Eye Exam  06/15/2022

## 2022-06-16 ENCOUNTER — PATIENT OUTREACH (OUTPATIENT)
Dept: ADMINISTRATIVE | Facility: HOSPITAL | Age: 72
End: 2022-06-16
Payer: MEDICARE

## 2022-07-06 ENCOUNTER — PATIENT MESSAGE (OUTPATIENT)
Dept: ADMINISTRATIVE | Facility: HOSPITAL | Age: 72
End: 2022-07-06
Payer: MEDICARE

## 2022-07-06 ENCOUNTER — PATIENT OUTREACH (OUTPATIENT)
Dept: ADMINISTRATIVE | Facility: HOSPITAL | Age: 72
End: 2022-07-06
Payer: MEDICARE

## 2022-07-06 DIAGNOSIS — Z12.31 ENCOUNTER FOR SCREENING MAMMOGRAM FOR MALIGNANT NEOPLASM OF BREAST: Primary | ICD-10-CM

## 2022-08-29 ENCOUNTER — PATIENT MESSAGE (OUTPATIENT)
Dept: ADMINISTRATIVE | Facility: HOSPITAL | Age: 72
End: 2022-08-29
Payer: MEDICARE

## 2022-08-29 ENCOUNTER — PATIENT OUTREACH (OUTPATIENT)
Dept: ADMINISTRATIVE | Facility: HOSPITAL | Age: 72
End: 2022-08-29
Payer: MEDICARE

## 2022-08-29 NOTE — LETTER
September 6, 2022    Amanda Reyes  [address] [place] [address]             [facility]  [address]  [place] [address]  Phone: [phone] Dear Ms. Reyes,     Your [facility] primary care team is dedicated to assisting you achieve your health goal.  In order to maintain your goal, scheduling your diabetic health maintenance requirements are ewing to successful disease management.      Neftali Peters MD has reviewed your records and found that you are overdue for your diabetic eye exam.  Yearly eye exams are especially important, as this can identify early eye complications and disease caused by your diabetes.  Neftali Peters MD has requested you schedule your diabetic eye exam and encourages you to schedule at any of the [facility] locations.  You can be seen conveniently at the [facility] location by calling [phone].       If you have already completed this exam at an outside facility, please notify us so we may request a copy of the exam and update your chart.      Sincerely,      Neftali Peters MD and your [facility] Team

## 2022-08-29 NOTE — PROGRESS NOTES
Non-compliant report chart audits. Chart review completed for HM test overdue (Mammogram, Colon Cancer Screening, Pap smear, DM labs, and/or Eye Exam)      Care Everywhere and media, updates requested and reviewed.        Contacted pt about overdue eye exam, portal message sent.

## 2022-08-31 DIAGNOSIS — Z78.0 MENOPAUSE: ICD-10-CM

## 2022-10-10 ENCOUNTER — PATIENT MESSAGE (OUTPATIENT)
Dept: ADMINISTRATIVE | Facility: HOSPITAL | Age: 72
End: 2022-10-10
Payer: MEDICARE

## 2022-11-14 ENCOUNTER — PES CALL (OUTPATIENT)
Dept: ADMINISTRATIVE | Facility: OTHER | Age: 72
End: 2022-11-14
Payer: MEDICARE

## 2022-12-02 ENCOUNTER — PATIENT OUTREACH (OUTPATIENT)
Dept: ADMINISTRATIVE | Facility: HOSPITAL | Age: 72
End: 2022-12-02
Payer: MEDICARE

## 2022-12-02 NOTE — PROGRESS NOTES
Non-compliant GAP report chart review -  12/02/2022- Chart review completed for the following HM test if overdue  (Mammogram, Colonoscopy, Screening,  Diabetic lab testing, and/or Dilated EYE EXAM) , Care Everywhere and Media reports - updates requested and reviewed.   [facility] and [facility] reviewed.   Patient outreach regarding Health Maintenance- Spk w/ dtr Gabriela Is Out of country /[facility]  Dtr get updated labs and upload in portal.    Health Maintenance Due   Topic Date Due    TETANUS VACCINE  Never done    Shingles Vaccine (1 of 2) Never done    Pneumococcal Vaccines (Age 65+) (2 - PCV) 01/27/2016    Mammogram  02/24/2018    DEXA Scan  06/29/2021    COVID-19 Vaccine (3 - Booster for Pfizer series) 08/13/2021    Hemoglobin A1c  10/09/2021    Diabetes Urine Screening  06/14/2022    Foot Exam  06/14/2022    Lipid Panel  06/14/2022    Eye Exam  06/15/2022    Influenza Vaccine (1) 09/01/2022

## 2022-12-13 ENCOUNTER — PATIENT MESSAGE (OUTPATIENT)
Dept: ADMINISTRATIVE | Facility: HOSPITAL | Age: 72
End: 2022-12-13
Payer: MEDICARE

## 2023-01-17 ENCOUNTER — PATIENT MESSAGE (OUTPATIENT)
Dept: ADMINISTRATIVE | Facility: HOSPITAL | Age: 73
End: 2023-01-17
Payer: MEDICARE

## 2023-02-07 DIAGNOSIS — Z00.00 ENCOUNTER FOR MEDICARE ANNUAL WELLNESS EXAM: ICD-10-CM

## 2023-02-09 DIAGNOSIS — Z00.00 ENCOUNTER FOR MEDICARE ANNUAL WELLNESS EXAM: ICD-10-CM

## 2023-06-19 ENCOUNTER — PATIENT OUTREACH (OUTPATIENT)
Dept: ADMINISTRATIVE | Facility: HOSPITAL | Age: 73
End: 2023-06-19
Payer: MEDICARE

## 2023-09-05 NOTE — TELEPHONE ENCOUNTER
Outreach to schedule appt- Not seen since 2018. LVM, and portal message    The patient is a 56y Female complaining of urinary symptoms.

## 2024-02-08 ENCOUNTER — PATIENT OUTREACH (OUTPATIENT)
Dept: ADMINISTRATIVE | Facility: HOSPITAL | Age: 74
End: 2024-02-08
Payer: MEDICAID

## 2024-02-08 NOTE — PROGRESS NOTES
02/08/2024 Gap report audit performed. Care Everywhere updates requested and reviewed  Overdue HM topic chart audit and/or requested. LINKS triggered and reconciled. Media reviewed : Telephone outreach to schedule Annual exam [place]

## 2024-04-09 ENCOUNTER — PATIENT OUTREACH (OUTPATIENT)
Dept: ADMINISTRATIVE | Facility: HOSPITAL | Age: 74
End: 2024-04-09
Payer: MEDICAID

## 2024-04-09 NOTE — PROGRESS NOTES
Health Maintenance Topic(s) Outreach Outcomes & Actions Taken:    Lab(s) - Outreach Outcomes & Actions Taken  : outreached    Primary Care Appt - Outreach Outcomes & Actions Taken  : outreached    Breast Cancer Screening - Outreach Outcomes & Actions Taken  : outreached    Osteoporosis Screening - Outreach Outcomes & Actions Taken  : outreached       Additional Notes:  LVM/ portal        Care Management, Digital Medicine, and/or Education Referrals      Next Steps - Referral Actions: n/a

## 2025-01-30 DIAGNOSIS — Z00.00 ENCOUNTER FOR MEDICARE ANNUAL WELLNESS EXAM: ICD-10-CM

## (undated) DEVICE — PACK BASIC

## (undated) DEVICE — BLADE SURG BVL ANG COAX 2.4MM

## (undated) DEVICE — KIT GREY EYE

## (undated) DEVICE — DRESSING AQUACEL AG ADV 3.5X12

## (undated) DEVICE — SOLUTION BSS PLUS

## (undated) DEVICE — SEE MEDLINE ITEM 152622

## (undated) DEVICE — GOWN SURGICAL X-LARGE

## (undated) DEVICE — KIT DRAIN HEMOVAC 3/16IN 400ML

## (undated) DEVICE — DRAPE STERI U-SHAPED 47X51IN

## (undated) DEVICE — BLADE SAW SAG 25.40MM X 1.27MM

## (undated) DEVICE — SUT CTD VICRYL 2.0

## (undated) DEVICE — SHIELD FOX W/GARTER

## (undated) DEVICE — COVER OVERHEAD SURG LT BLUE

## (undated) DEVICE — GLOVE BIOGEL ORTHOPEDIC 7.5

## (undated) DEVICE — BLADE SURG CARBON STEEL #10

## (undated) DEVICE — SEE MEDLINE ITEM 152530

## (undated) DEVICE — INTERPULSE SET

## (undated) DEVICE — SUT VICRYL 1 OB 36 CTX

## (undated) DEVICE — STAPLER SKIN ROTATING HEAD

## (undated) DEVICE — DURAPREP SURG SCRUB 26ML

## (undated) DEVICE — ELECTRODE REM PLYHSV RETURN 9

## (undated) DEVICE — TRAY FOLEY 16FR INFECTION CONT

## (undated) DEVICE — DRAPE INCISE IOBAN 2 23X33IN

## (undated) DEVICE — DRAPE PLASTIC U 60X72

## (undated) DEVICE — SOL BETADINE 5%

## (undated) DEVICE — SOL WATER STRL IRR 1000ML

## (undated) DEVICE — SLEEVE ULTRA INFUSION

## (undated) DEVICE — SCRUB 10% POVIDONE IODINE 4OZ

## (undated) DEVICE — PAD PREP 50/CA

## (undated) DEVICE — DRAPE C-ARM/MOBILE XRAY 44X80

## (undated) DEVICE — MANIFOLD 4 PORT

## (undated) DEVICE — DRAPE HIP TIBURON 87X115X134

## (undated) DEVICE — CLOSURE SKIN STERI STRIP 1/2X4

## (undated) DEVICE — GLOVE BIOGEL PI MICRO INDIC 8

## (undated) DEVICE — STRIP MG FML-GLO .06 - ORDER F

## (undated) DEVICE — SOL IRR NACL .9% 3000ML

## (undated) DEVICE — SEE MEDLINE ITEM 157117

## (undated) DEVICE — SEE MEDLINE ITEM 156955

## (undated) DEVICE — GAUZE SPONGE 4X4 12PLY

## (undated) DEVICE — KNIFE ANGLE 1MM

## (undated) DEVICE — ADHESIVE DERMABOND ADVANCED

## (undated) DEVICE — ALCOHOL 70% ISOP W/GREEN 16OZ

## (undated) DEVICE — SUT POLYGLACTIN 2-0 TP-1 27

## (undated) DEVICE — DRAPE STERI 32 X 50

## (undated) DEVICE — COVER BACK TABLE 72X21

## (undated) DEVICE — TIP PHACO 45 DEGREE KELMAN

## (undated) DEVICE — Device

## (undated) DEVICE — SUT ETHIBOND XTRA 1 CTX

## (undated) DEVICE — CARTRIDGE LENS D

## (undated) DEVICE — SEE MEDLINE ITEM 157216

## (undated) DEVICE — HYDRODISSECTOR NUCLEUS 27GX7/8

## (undated) DEVICE — SPONGE LAP 18X18 PREWASHED

## (undated) DEVICE — DRAPE STERI INSTRUMENT 1018

## (undated) DEVICE — SEE MEDLINE ITEM 146292

## (undated) DEVICE — SEE MEDLINE ITEM 157131

## (undated) DEVICE — SEE MEDLINE ITEM 153151

## (undated) DEVICE — HOOD T-5 TEAR AWAY STERILE

## (undated) DEVICE — SEE MEDLINE ITEM 157116

## (undated) DEVICE — SUPPORT ULNA NERVE PROTECTOR

## (undated) DEVICE — SUT STRATAFIX PDS 1 CTX 18IN